# Patient Record
Sex: FEMALE | Race: BLACK OR AFRICAN AMERICAN | NOT HISPANIC OR LATINO | Employment: OTHER | URBAN - METROPOLITAN AREA
[De-identification: names, ages, dates, MRNs, and addresses within clinical notes are randomized per-mention and may not be internally consistent; named-entity substitution may affect disease eponyms.]

---

## 2017-10-02 ENCOUNTER — HOSPITAL ENCOUNTER (EMERGENCY)
Facility: HOSPITAL | Age: 37
Discharge: HOME | End: 2017-10-02
Attending: EMERGENCY MEDICINE
Payer: MEDICAID

## 2017-10-02 VITALS
RESPIRATION RATE: 18 BRPM | TEMPERATURE: 98 F | HEIGHT: 66 IN | WEIGHT: 190 LBS | DIASTOLIC BLOOD PRESSURE: 90 MMHG | SYSTOLIC BLOOD PRESSURE: 139 MMHG | HEART RATE: 70 BPM | OXYGEN SATURATION: 100 % | BODY MASS INDEX: 30.53 KG/M2

## 2017-10-02 DIAGNOSIS — F41.9 ANXIETY: ICD-10-CM

## 2017-10-02 DIAGNOSIS — M54.2 NECK PAIN ON LEFT SIDE: ICD-10-CM

## 2017-10-02 DIAGNOSIS — R07.89 MUSCULOSKELETAL CHEST PAIN: ICD-10-CM

## 2017-10-02 DIAGNOSIS — M25.512 ACUTE PAIN OF LEFT SHOULDER: Primary | ICD-10-CM

## 2017-10-02 LAB
ALBUMIN SERPL BCP-MCNC: 3.3 G/DL
ALP SERPL-CCNC: 61 U/L
ALT SERPL W/O P-5'-P-CCNC: 43 U/L
ANION GAP SERPL CALC-SCNC: 11 MMOL/L
AST SERPL-CCNC: 34 U/L
B-HCG UR QL: NEGATIVE
BASOPHILS # BLD AUTO: 0.01 K/UL
BASOPHILS NFR BLD: 0.2 %
BILIRUB SERPL-MCNC: 0.4 MG/DL
BNP SERPL-MCNC: 22 PG/ML
BUN SERPL-MCNC: 11 MG/DL
CALCIUM SERPL-MCNC: 9.1 MG/DL
CHLORIDE SERPL-SCNC: 106 MMOL/L
CO2 SERPL-SCNC: 22 MMOL/L
CREAT SERPL-MCNC: 1 MG/DL
CTP QC/QA: YES
DIFFERENTIAL METHOD: ABNORMAL
EOSINOPHIL # BLD AUTO: 0.1 K/UL
EOSINOPHIL NFR BLD: 1.9 %
ERYTHROCYTE [DISTWIDTH] IN BLOOD BY AUTOMATED COUNT: 12.4 %
EST. GFR  (AFRICAN AMERICAN): >60 ML/MIN/1.73 M^2
EST. GFR  (NON AFRICAN AMERICAN): >60 ML/MIN/1.73 M^2
GLUCOSE SERPL-MCNC: 111 MG/DL
HCT VFR BLD AUTO: 37.6 %
HGB BLD-MCNC: 12.4 G/DL
INR PPP: 1
LYMPHOCYTES # BLD AUTO: 1.7 K/UL
LYMPHOCYTES NFR BLD: 29.1 %
MCH RBC QN AUTO: 32.2 PG
MCHC RBC AUTO-ENTMCNC: 33 G/DL
MCV RBC AUTO: 98 FL
MONOCYTES # BLD AUTO: 0.4 K/UL
MONOCYTES NFR BLD: 6.6 %
NEUTROPHILS # BLD AUTO: 3.6 K/UL
NEUTROPHILS NFR BLD: 62 %
PLATELET # BLD AUTO: 252 K/UL
PMV BLD AUTO: 10.2 FL
POTASSIUM SERPL-SCNC: 3.8 MMOL/L
PROT SERPL-MCNC: 7.5 G/DL
PROTHROMBIN TIME: 10.9 SEC
RBC # BLD AUTO: 3.85 M/UL
SODIUM SERPL-SCNC: 139 MMOL/L
TROPONIN I SERPL DL<=0.01 NG/ML-MCNC: <0.006 NG/ML
WBC # BLD AUTO: 5.77 K/UL

## 2017-10-02 PROCEDURE — 81025 URINE PREGNANCY TEST: CPT | Performed by: EMERGENCY MEDICINE

## 2017-10-02 PROCEDURE — 85025 COMPLETE CBC W/AUTO DIFF WBC: CPT

## 2017-10-02 PROCEDURE — 63600175 PHARM REV CODE 636 W HCPCS: Performed by: PHYSICIAN ASSISTANT

## 2017-10-02 PROCEDURE — 84484 ASSAY OF TROPONIN QUANT: CPT

## 2017-10-02 PROCEDURE — 96372 THER/PROPH/DIAG INJ SC/IM: CPT

## 2017-10-02 PROCEDURE — 83880 ASSAY OF NATRIURETIC PEPTIDE: CPT

## 2017-10-02 PROCEDURE — 99285 EMERGENCY DEPT VISIT HI MDM: CPT | Mod: 25

## 2017-10-02 PROCEDURE — 85610 PROTHROMBIN TIME: CPT

## 2017-10-02 PROCEDURE — 80053 COMPREHEN METABOLIC PANEL: CPT

## 2017-10-02 RX ORDER — MELOXICAM 15 MG/1
15 TABLET ORAL DAILY
Qty: 20 TABLET | Refills: 0 | Status: SHIPPED | OUTPATIENT
Start: 2017-10-02 | End: 2017-10-22

## 2017-10-02 RX ORDER — METHOCARBAMOL 500 MG/1
1000 TABLET, FILM COATED ORAL 3 TIMES DAILY PRN
Qty: 20 TABLET | Refills: 0 | Status: SHIPPED | OUTPATIENT
Start: 2017-10-02 | End: 2017-10-07

## 2017-10-02 RX ORDER — ORPHENADRINE CITRATE 30 MG/ML
30 INJECTION INTRAMUSCULAR; INTRAVENOUS
Status: COMPLETED | OUTPATIENT
Start: 2017-10-02 | End: 2017-10-02

## 2017-10-02 RX ORDER — KETOROLAC TROMETHAMINE 30 MG/ML
15 INJECTION, SOLUTION INTRAMUSCULAR; INTRAVENOUS
Status: COMPLETED | OUTPATIENT
Start: 2017-10-02 | End: 2017-10-02

## 2017-10-02 RX ORDER — HYDROXYZINE PAMOATE 25 MG/1
25 CAPSULE ORAL 4 TIMES DAILY
Qty: 20 CAPSULE | Refills: 0 | Status: SHIPPED | OUTPATIENT
Start: 2017-10-02 | End: 2017-10-07

## 2017-10-02 RX ORDER — DIPHENHYDRAMINE HYDROCHLORIDE 50 MG/ML
25 INJECTION INTRAMUSCULAR; INTRAVENOUS
Status: COMPLETED | OUTPATIENT
Start: 2017-10-02 | End: 2017-10-02

## 2017-10-02 RX ADMIN — KETOROLAC TROMETHAMINE 15 MG: 30 INJECTION, SOLUTION INTRAMUSCULAR at 06:10

## 2017-10-02 RX ADMIN — ORPHENADRINE CITRATE 30 MG: 30 INJECTION INTRAMUSCULAR; INTRAVENOUS at 07:10

## 2017-10-02 RX ADMIN — DIPHENHYDRAMINE HYDROCHLORIDE 25 MG: 50 INJECTION, SOLUTION INTRAMUSCULAR; INTRAVENOUS at 07:10

## 2017-10-02 NOTE — ED PROVIDER NOTES
"Encounter Date: 10/2/2017    SCRIBE #1 NOTE: I, Abdirizak Ralph, am scribing for, and in the presence of,  Dulce Matthews PA-C. I have scribed the following portions of the note - Other sections scribed: HPI and ROS.       History     Chief Complaint   Patient presents with    Arm Pain     Pt states that she is having left arm pain that radiates to the left side of her chest when she moves. Pt denies SOB, nv. Denies trauma     CC: Arm Pain     HPI: This 37 y.o F with Bipolar 1 disorder, DM, HTN, anxiety and seizures presents to the ED c/o acute onset of severe (10/10) L upper extremity pain radiating to the L side chest which began at approximately 1400. The pt also reports L shoulder swelling and an episode of dizziness described as "room spinning" lasting 10 minutes. Pt's pain is worse when laying down and with L arm ROM. The pt suspects her symptoms may be secondary to stress, noting her sister recently passed away. Pt is tearful and reports she experienced SI last week and "thought about stabbing myself." Denies SI at this time and states "I can't do that to my sister's kids; I need to be strong for them." Pt denies previous episodes of similar symptoms. Pt is compliant with HTN medication. The pt denies weakness, numbness, tingling, light-headednes, diaphoresis, SOB, fever, chills, congestion, otalgia, sore throat, abdominal pain, N/V, neck pain, back pain, trauma, heavy lifting, and recent exercise. No prior tx.       The history is provided by the patient. No  was used.     Review of patient's allergies indicates:  No Known Allergies  Past Medical History:   Diagnosis Date    Bipolar 1 disorder     DM mellitus, gestational     Hypertension     Seizures      Past Surgical History:   Procedure Laterality Date     SECTION, LOW TRANSVERSE      x 3    ECTOPIC PREGNANCY SURGERY      KNEE SURGERY  2010    left knee    SALPINGECTOMY      THYROID SURGERY      TUBAL LIGATION   " "    Family History   Problem Relation Age of Onset    Heart disease Mother     Cancer Mother      Breast Cancer    Cancer Father      Throat Cancer    Cancer Maternal Grandmother     Cancer Maternal Grandfather      Social History   Substance Use Topics    Smoking status: Current Some Day Smoker     Types: Cigars    Smokeless tobacco: Never Used      Comment: Patient states she smokes 1 cigar per week.    Alcohol use Yes      Comment: socially     Review of Systems   Constitutional: Negative for chills, diaphoresis and fever.   HENT: Negative for congestion, ear pain, rhinorrhea and sore throat.    Eyes: Negative for redness.   Respiratory: Negative for cough and shortness of breath.    Cardiovascular: Positive for chest pain (L side).   Gastrointestinal: Negative for abdominal pain, diarrhea, nausea and vomiting.   Genitourinary: Negative for dysuria, frequency and urgency.   Musculoskeletal: Positive for joint swelling (L shoulder). Negative for back pain and neck pain.        (+) L upper extremity pain   Skin: Negative for rash.   Neurological: Positive for dizziness ("room spinning"). Negative for weakness and numbness.   Psychiatric/Behavioral: The patient is not nervous/anxious.        Physical Exam     Initial Vitals [10/02/17 1617]   BP Pulse Resp Temp SpO2   (!) 140/95 80 16 98.5 °F (36.9 °C) 96 %      MAP       110         Physical Exam    Constitutional: She appears well-developed and well-nourished.   tearful   HENT:   Head: Normocephalic.   Right Ear: External ear normal.   Left Ear: External ear normal.   Eyes: Conjunctivae are normal.   Neck: Normal range of motion.   Left paraspinal musculature TTP    Cardiovascular: Normal rate and regular rhythm. Exam reveals no gallop and no friction rub.    No murmur heard.  Pulses:       Radial pulses are 2+ on the right side, and 2+ on the left side.   Pulmonary/Chest: Breath sounds normal. She has no wheezes. She has no rhonchi. She has no rales. She " "exhibits tenderness (L chest wall).   Abdominal: Normal appearance and bowel sounds are normal. There is no tenderness. There is no rigidity, no rebound, no guarding and no CVA tenderness.   Musculoskeletal:   Diffuse TTP over anterolateral shoulder with no swelling or erythema. ROM limited due to pain.    Neurological: She is alert. No sensory deficit.   Skin: Skin is warm and dry. No erythema.   Psychiatric: She exhibits a depressed mood. She expresses no homicidal and no suicidal ideation. She expresses no suicidal plans and no homicidal plans.         ED Course   Procedures  Labs Reviewed   CBC W/ AUTO DIFFERENTIAL - Abnormal; Notable for the following:        Result Value    RBC 3.85 (*)     MCH 32.2 (*)     All other components within normal limits   COMPREHENSIVE METABOLIC PANEL - Abnormal; Notable for the following:     CO2 22 (*)     Glucose 111 (*)     Albumin 3.3 (*)     All other components within normal limits   TROPONIN I   PROTIME-INR   B-TYPE NATRIURETIC PEPTIDE   POCT URINE PREGNANCY             Medical Decision Making:   Initial Assessment:   She is a 37-year-old female who presents for evaluation of constant left arm and left-sided chest pain since 2 PM today.  Patient reports she had 10 minute episode of dizziness earlier.  Denies lightheadedness or diaphoresis.  Patient states she has not slept in 2 days because her sister passed away last week.  She reports she is under increased stress because "they're trying to separate my sister's 8 children."  She reports that she had SI last week and had plan to stab herself but states she would not do that because she needs to be strong for her sister's children. Denies SI or HI at this time.  She is tearful throughout the exam.  She does have history of anxiety and states she took her last Xanax 2 day.  She has follow-up appointment with her primary care doctor on Friday for refill.  Physical exam findings as detailed above.  EKG and cardiac workup " unremarkable.  Chest x-ray unremarkable.  Considered but doubt ACS, PNA, pneumothorax.  With reproducible chest wall pain think this is likely musculoskeletal.  Patient given Toradol and Norflex in the ED.  Patient also given IM Benadryl for anxiety.  Discharge patient to home with a week and Robaxin for pain and Vistaril for anxiety.  PCP, Dr. Kennedy, follow-up in 2 days.  ER return precautions discussed including worsening symptoms, SI, HI or as needed.  I discussed this patient with Dr. Blanc who agrees with assessment and plan.             Scribe Attestation:   Scribe #1: I performed the above scribed service and the documentation accurately describes the services I performed. I attest to the accuracy of the note.    Attending Attestation:           Physician Attestation for Scribe:  Physician Attestation Statement for Scribe #1: I, Dulce Matthews PA-C, reviewed documentation, as scribed by Abdirizak Ralph in my presence, and it is both accurate and complete.                 ED Course      Clinical Impression:   The primary encounter diagnosis was Acute pain of left shoulder. Diagnoses of Neck pain on left side, Musculoskeletal chest pain, and Anxiety were also pertinent to this visit.                           Dulce Matthews PA-C  10/04/17 0131

## 2017-10-03 NOTE — DISCHARGE INSTRUCTIONS
Please take Mobic and Robaxin as prescribed for pain.  Also take Vistaril as prescribed for anxiety.    Follow up with primary care in 2 days.    Return to ER if you develop worsening symptoms, thoughts of hurting yourself or others, or as needed.

## 2017-10-24 ENCOUNTER — HOSPITAL ENCOUNTER (EMERGENCY)
Facility: HOSPITAL | Age: 37
Discharge: HOME | End: 2017-10-24
Attending: EMERGENCY MEDICINE
Payer: MEDICAID

## 2017-10-24 VITALS
WEIGHT: 186 LBS | OXYGEN SATURATION: 98 % | HEIGHT: 66 IN | BODY MASS INDEX: 29.89 KG/M2 | SYSTOLIC BLOOD PRESSURE: 180 MMHG | TEMPERATURE: 98 F | RESPIRATION RATE: 18 BRPM | DIASTOLIC BLOOD PRESSURE: 116 MMHG | HEART RATE: 82 BPM

## 2017-10-24 DIAGNOSIS — R56.9 SEIZURE: Primary | ICD-10-CM
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
## 2017-10-24 DIAGNOSIS — T14.90XA TRAUMA: ICD-10-CM

## 2017-10-24 LAB
ALBUMIN SERPL BCP-MCNC: 3.4 G/DL
ALP SERPL-CCNC: 61 U/L
ALT SERPL W/O P-5'-P-CCNC: 32 U/L
AMORPH CRY URNS QL MICRO: ABNORMAL
AMPHET+METHAMPHET UR QL: NORMAL
ANION GAP SERPL CALC-SCNC: 7 MMOL/L
ANION GAP SERPL CALC-SCNC: 8 MMOL/L
AST SERPL-CCNC: 27 U/L
B-HCG UR QL: NEGATIVE
B-HCG UR QL: NEGATIVE
BACTERIA #/AREA URNS HPF: ABNORMAL /HPF
BARBITURATES UR QL SCN>200 NG/ML: NEGATIVE
BASOPHILS # BLD AUTO: 0.01 K/UL
BASOPHILS NFR BLD: 0.1 %
BENZODIAZ UR QL SCN>200 NG/ML: NORMAL
BILIRUB SERPL-MCNC: 0.2 MG/DL
BILIRUB UR QL STRIP: NEGATIVE
BUN SERPL-MCNC: 10 MG/DL
BUN SERPL-MCNC: 10 MG/DL
BZE UR QL SCN: NEGATIVE
CALCIUM SERPL-MCNC: 9.5 MG/DL
CALCIUM SERPL-MCNC: 9.5 MG/DL
CANNABINOIDS UR QL SCN: NEGATIVE
CHLORIDE SERPL-SCNC: 106 MMOL/L
CHLORIDE SERPL-SCNC: 106 MMOL/L
CLARITY UR: ABNORMAL
CO2 SERPL-SCNC: 24 MMOL/L
CO2 SERPL-SCNC: 26 MMOL/L
COLOR UR: YELLOW
CREAT SERPL-MCNC: 0.9 MG/DL
CREAT SERPL-MCNC: 0.9 MG/DL
CREAT UR-MCNC: 125.2 MG/DL
CTP QC/QA: YES
DIFFERENTIAL METHOD: ABNORMAL
EOSINOPHIL # BLD AUTO: 0.2 K/UL
EOSINOPHIL NFR BLD: 2 %
ERYTHROCYTE [DISTWIDTH] IN BLOOD BY AUTOMATED COUNT: 12.3 %
EST. GFR  (AFRICAN AMERICAN): >60 ML/MIN/1.73 M^2
EST. GFR  (AFRICAN AMERICAN): >60 ML/MIN/1.73 M^2
EST. GFR  (NON AFRICAN AMERICAN): >60 ML/MIN/1.73 M^2
EST. GFR  (NON AFRICAN AMERICAN): >60 ML/MIN/1.73 M^2
GLUCOSE SERPL-MCNC: 70 MG/DL
GLUCOSE SERPL-MCNC: 72 MG/DL
GLUCOSE UR QL STRIP: NEGATIVE
HCT VFR BLD AUTO: 39.5 %
HGB BLD-MCNC: 13.6 G/DL
HGB UR QL STRIP: ABNORMAL
KETONES UR QL STRIP: NEGATIVE
LEUKOCYTE ESTERASE UR QL STRIP: NEGATIVE
LYMPHOCYTES # BLD AUTO: 1.6 K/UL
LYMPHOCYTES NFR BLD: 20.9 %
MCH RBC QN AUTO: 32.7 PG
MCHC RBC AUTO-ENTMCNC: 34.4 G/DL
MCV RBC AUTO: 95 FL
METHADONE UR QL SCN>300 NG/ML: NEGATIVE
MICROSCOPIC COMMENT: ABNORMAL
MONOCYTES # BLD AUTO: 0.5 K/UL
MONOCYTES NFR BLD: 6.5 %
NEUTROPHILS # BLD AUTO: 5.3 K/UL
NEUTROPHILS NFR BLD: 70.5 %
NITRITE UR QL STRIP: NEGATIVE
OPIATES UR QL SCN: NEGATIVE
PCP UR QL SCN>25 NG/ML: NEGATIVE
PH UR STRIP: 8 [PH] (ref 5–8)
PHENYTOIN SERPL-MCNC: <0.1 UG/ML
PLATELET # BLD AUTO: 279 K/UL
PMV BLD AUTO: 9.8 FL
POTASSIUM SERPL-SCNC: 3.9 MMOL/L
POTASSIUM SERPL-SCNC: 3.9 MMOL/L
PROT SERPL-MCNC: 7.8 G/DL
PROT UR QL STRIP: NEGATIVE
RBC # BLD AUTO: 4.16 M/UL
RBC #/AREA URNS HPF: 3 /HPF (ref 0–4)
SODIUM SERPL-SCNC: 138 MMOL/L
SODIUM SERPL-SCNC: 139 MMOL/L
SP GR UR STRIP: 1.02 (ref 1–1.03)
SQUAMOUS #/AREA URNS HPF: 4 /HPF
TOXICOLOGY INFORMATION: NORMAL
URN SPEC COLLECT METH UR: ABNORMAL
UROBILINOGEN UR STRIP-ACNC: ABNORMAL EU/DL
WBC # BLD AUTO: 7.56 K/UL
WBC #/AREA URNS HPF: 1 /HPF (ref 0–5)

## 2017-10-24 PROCEDURE — 85025 COMPLETE CBC W/AUTO DIFF WBC: CPT

## 2017-10-24 PROCEDURE — 80307 DRUG TEST PRSMV CHEM ANLYZR: CPT

## 2017-10-24 PROCEDURE — 81000 URINALYSIS NONAUTO W/SCOPE: CPT

## 2017-10-24 PROCEDURE — 81025 URINE PREGNANCY TEST: CPT | Performed by: PHYSICIAN ASSISTANT

## 2017-10-24 PROCEDURE — 93010 ELECTROCARDIOGRAM REPORT: CPT | Mod: ,,, | Performed by: INTERNAL MEDICINE

## 2017-10-24 PROCEDURE — 99285 EMERGENCY DEPT VISIT HI MDM: CPT | Mod: 25

## 2017-10-24 PROCEDURE — 81025 URINE PREGNANCY TEST: CPT

## 2017-10-24 PROCEDURE — 80048 BASIC METABOLIC PNL TOTAL CA: CPT

## 2017-10-24 PROCEDURE — 12011 RPR F/E/E/N/L/M 2.5 CM/<: CPT

## 2017-10-24 PROCEDURE — 25000003 PHARM REV CODE 250: Performed by: EMERGENCY MEDICINE

## 2017-10-24 PROCEDURE — 96365 THER/PROPH/DIAG IV INF INIT: CPT

## 2017-10-24 PROCEDURE — 80185 ASSAY OF PHENYTOIN TOTAL: CPT

## 2017-10-24 PROCEDURE — 80053 COMPREHEN METABOLIC PANEL: CPT

## 2017-10-24 PROCEDURE — 93005 ELECTROCARDIOGRAM TRACING: CPT

## 2017-10-24 PROCEDURE — 63600175 PHARM REV CODE 636 W HCPCS: Performed by: EMERGENCY MEDICINE

## 2017-10-24 RX ORDER — HYDROXYZINE HYDROCHLORIDE 25 MG/1
25 TABLET, FILM COATED ORAL 3 TIMES DAILY
COMMUNITY
End: 2022-03-28

## 2017-10-24 RX ORDER — PHENYTOIN SODIUM 50 MG/ML
1000 INJECTION INTRAMUSCULAR; INTRAVENOUS
Status: DISCONTINUED | OUTPATIENT
Start: 2017-10-24 | End: 2017-10-24

## 2017-10-24 RX ORDER — LOSARTAN POTASSIUM AND HYDROCHLOROTHIAZIDE 25; 100 MG/1; MG/1
1 TABLET ORAL DAILY
COMMUNITY
End: 2020-09-08 | Stop reason: SDUPTHER

## 2017-10-24 RX ORDER — OXYCODONE AND ACETAMINOPHEN 5; 325 MG/1; MG/1
1 TABLET ORAL
Status: DISCONTINUED | OUTPATIENT
Start: 2017-10-24 | End: 2017-10-25 | Stop reason: HOSPADM

## 2017-10-24 RX ORDER — MIRTAZAPINE 15 MG/1
15 TABLET, FILM COATED ORAL NIGHTLY
COMMUNITY
End: 2021-04-27 | Stop reason: ALTCHOICE

## 2017-10-24 RX ORDER — METHOCARBAMOL 500 MG/1
500 TABLET, FILM COATED ORAL 4 TIMES DAILY
COMMUNITY

## 2017-10-24 RX ORDER — ACETAMINOPHEN 500 MG
1000 TABLET ORAL
Status: COMPLETED | OUTPATIENT
Start: 2017-10-24 | End: 2017-10-24

## 2017-10-24 RX ORDER — MELOXICAM 15 MG/1
15 TABLET ORAL DAILY
Status: ON HOLD | COMMUNITY
End: 2022-06-01 | Stop reason: HOSPADM

## 2017-10-24 RX ORDER — AMLODIPINE BESYLATE 5 MG/1
5 TABLET ORAL DAILY
COMMUNITY
End: 2022-05-07

## 2017-10-24 RX ADMIN — ACETAMINOPHEN 1000 MG: 500 TABLET ORAL at 05:10

## 2017-10-24 RX ADMIN — PHENYTOIN SODIUM 1000 MG: 50 INJECTION INTRAMUSCULAR; INTRAVENOUS at 06:10

## 2017-10-24 NOTE — ED PROVIDER NOTES
Encounter Date: 10/24/2017       History     Chief Complaint   Patient presents with    Seizures     unknown if she fell. Unwitnessed. Compliant with Dilantin. +1 inch lac to chin.      37-year-old black female with a known history of seizures on Dilantin compliant with same presents with generalized tonic-clonic seizure today fell and hit her face and chin has a small lag on the bottom of her chin no active bleeding she was postictal and is back to normal baseline now          Review of patient's allergies indicates:  No Known Allergies  Past Medical History:   Diagnosis Date    Bipolar 1 disorder     DM mellitus, gestational     Hypertension     Seizures      Past Surgical History:   Procedure Laterality Date     SECTION, LOW TRANSVERSE      x 3    ECTOPIC PREGNANCY SURGERY      KNEE SURGERY  2010    left knee    SALPINGECTOMY      THYROID SURGERY      TUBAL LIGATION       Family History   Problem Relation Age of Onset    Heart disease Mother     Cancer Mother      Breast Cancer    Cancer Father      Throat Cancer    Cancer Maternal Grandmother     Cancer Maternal Grandfather      Social History   Substance Use Topics    Smoking status: Current Some Day Smoker     Types: Cigars    Smokeless tobacco: Never Used      Comment: Patient states she smokes 1 cigar per week.    Alcohol use Yes      Comment: socially     Review of Systems   Neurological: Positive for seizures.   All other systems reviewed and are negative.      Physical Exam     Initial Vitals [10/24/17 1257]   BP Pulse Resp Temp SpO2   (!) 139/90 77 16 98.9 °F (37.2 °C) 99 %      MAP       106.33         Physical Exam    Nursing note and vitals reviewed.  Constitutional: She appears well-developed and well-nourished.   HENT:   Head: Normocephalic and atraumatic.   Mouth/Throat: Oropharynx is clear and moist.   Eyes: Conjunctivae and EOM are normal. Pupils are equal, round, and reactive to light.   Neck: Normal range of motion.  Neck supple.   Cardiovascular: Normal rate, regular rhythm, normal heart sounds and intact distal pulses.   Pulmonary/Chest: Breath sounds normal.   Abdominal: Soft. Bowel sounds are normal.   Musculoskeletal: Normal range of motion.   Neurological: She is alert and oriented to person, place, and time. She has normal strength and normal reflexes.   Skin: Skin is warm and dry.   Patient has a 0.5 cm laceration to the bottom of her chin to subcutaneous tissue no active bleeding   Psychiatric: She has a normal mood and affect. Thought content normal.         ED Course   Lac Repair  Date/Time: 10/24/2017 4:23 PM  Performed by: MAEVE BAÑUELOS  Authorized by: MAEVE BAÑUELOS   Consent Done: Not Needed  Body area: head/neck  Location details: chin  Laceration length: 0.5 cm  Tendon involvement: none  Nerve involvement: none  Vascular damage: no  Preparation: Patient was prepped and draped in the usual sterile fashion.  Irrigation solution: saline  Irrigation method: syringe  Amount of cleaning: standard  Debridement: none  Degree of undermining: none  Skin closure: glue  Approximation: close  Approximation difficulty: simple  Patient tolerance: Patient tolerated the procedure well with no immediate complications        Labs Reviewed   CBC W/ AUTO DIFFERENTIAL - Abnormal; Notable for the following:        Result Value    MCH 32.7 (*)     All other components within normal limits   COMPREHENSIVE METABOLIC PANEL - Abnormal; Notable for the following:     Albumin 3.4 (*)     Anion Gap 7 (*)     All other components within normal limits   PHENYTOIN LEVEL, TOTAL - Abnormal; Notable for the following:     Phenytoin Lvl <0.1 (*)     All other components within normal limits   URINALYSIS - Abnormal; Notable for the following:     Appearance, UA Cloudy (*)     Occult Blood UA 2+ (*)     Urobilinogen, UA 4.0-6.0 (*)     All other components within normal limits   URINALYSIS MICROSCOPIC - Abnormal; Notable for the  following:     Amorphous, UA Many (*)     All other components within normal limits   DRUG SCREEN PANEL, URINE EMERGENCY   PREGNANCY TEST, URINE RAPID   BASIC METABOLIC PANEL   PHENYTOIN LEVEL, TOTAL   CBC W/ AUTO DIFFERENTIAL   POCT URINE PREGNANCY   POCT GLUCOSE MONITORING CONTINUOUS             Medical Decision Making:   Differential Diagnosis:   37-year-old black female with a known seizure disorder on Dilantin had a generalized tonic-clonic seizure today fell forward onto her face was postictal now back to normal baseline mental status he only injury she has is a 0.5 cm laceration to the underside of her chin which I closed with Dermabond see procedure note by CT her head face and cervical spine all read as negative by the radiologist her Dilantin level was 0I give her a gram of Dilantin IV here in the emergency department prescribe her Dilantin to continue taking at home after follow-up with her primary care doctor soon as possible return ER for any altered mental status change of vision speech strength K sensation nausea vomiting                   ED Course      Clinical Impression:   Seizure                             Kj Oconnor MD  10/24/17 3174

## 2017-10-24 NOTE — ED TRIAGE NOTES
"Pt arrived to Ed due to unwitnessed seizure. " pt states I don't remember any thing." pt is AAOx3 at this time. Pt states having HA at this time. Pt states falling face first. Pt noted to have laceration to chin   "

## 2017-10-25 NOTE — ED NOTES
Pt left with receiving her discharge paperwork. PT was upset she didn't received her pain medications .

## 2017-11-18 ENCOUNTER — HOSPITAL ENCOUNTER (EMERGENCY)
Facility: HOSPITAL | Age: 37
Discharge: HOME | End: 2017-11-19
Attending: EMERGENCY MEDICINE
Payer: MEDICAID

## 2017-11-18 DIAGNOSIS — F19.10 POLYSUBSTANCE ABUSE: Primary | ICD-10-CM

## 2017-11-18 DIAGNOSIS — F10.929 ALCOHOLIC INTOXICATION WITH COMPLICATION: ICD-10-CM

## 2017-11-18 DIAGNOSIS — Z91.148 NONCOMPLIANCE WITH MEDICATION REGIMEN: ICD-10-CM

## 2017-11-18 DIAGNOSIS — R45.1 AGITATION: ICD-10-CM

## 2017-11-18 DIAGNOSIS — R56.9 SEIZURE: ICD-10-CM

## 2017-11-18 LAB
ALBUMIN SERPL BCP-MCNC: 4.2 G/DL
ALP SERPL-CCNC: 65 U/L
ALT SERPL W/O P-5'-P-CCNC: 56 U/L
AMPHET+METHAMPHET UR QL: NORMAL
ANION GAP SERPL CALC-SCNC: 12 MMOL/L
APAP SERPL-MCNC: <3 UG/ML
AST SERPL-CCNC: 51 U/L
B-HCG UR QL: NEGATIVE
BARBITURATES UR QL SCN>200 NG/ML: NEGATIVE
BASOPHILS # BLD AUTO: 0.01 K/UL
BASOPHILS NFR BLD: 0.1 %
BENZODIAZ UR QL SCN>200 NG/ML: NEGATIVE
BILIRUB SERPL-MCNC: 0.2 MG/DL
BILIRUB UR QL STRIP: NEGATIVE
BUN SERPL-MCNC: 14 MG/DL
BZE UR QL SCN: NORMAL
CALCIUM SERPL-MCNC: 9.5 MG/DL
CANNABINOIDS UR QL SCN: NEGATIVE
CHLORIDE SERPL-SCNC: 107 MMOL/L
CLARITY UR: CLEAR
CO2 SERPL-SCNC: 21 MMOL/L
COLOR UR: NORMAL
CREAT SERPL-MCNC: 1 MG/DL
CREAT UR-MCNC: 40.2 MG/DL
DIFFERENTIAL METHOD: ABNORMAL
EOSINOPHIL # BLD AUTO: 0 K/UL
EOSINOPHIL NFR BLD: 0.3 %
ERYTHROCYTE [DISTWIDTH] IN BLOOD BY AUTOMATED COUNT: 12.5 %
EST. GFR  (AFRICAN AMERICAN): >60 ML/MIN/1.73 M^2
EST. GFR  (NON AFRICAN AMERICAN): >60 ML/MIN/1.73 M^2
ETHANOL SERPL-MCNC: 161 MG/DL
GLUCOSE SERPL-MCNC: 102 MG/DL
GLUCOSE UR QL STRIP: NEGATIVE
HCT VFR BLD AUTO: 39.9 %
HGB BLD-MCNC: 13.6 G/DL
HGB UR QL STRIP: NEGATIVE
KETONES UR QL STRIP: NEGATIVE
LEUKOCYTE ESTERASE UR QL STRIP: NEGATIVE
LYMPHOCYTES # BLD AUTO: 1.4 K/UL
LYMPHOCYTES NFR BLD: 14.3 %
MCH RBC QN AUTO: 32.6 PG
MCHC RBC AUTO-ENTMCNC: 34.1 G/DL
MCV RBC AUTO: 96 FL
METHADONE UR QL SCN>300 NG/ML: NEGATIVE
MONOCYTES # BLD AUTO: 0.4 K/UL
MONOCYTES NFR BLD: 3.9 %
NEUTROPHILS # BLD AUTO: 7.8 K/UL
NEUTROPHILS NFR BLD: 81.4 %
NITRITE UR QL STRIP: NEGATIVE
OPIATES UR QL SCN: NEGATIVE
PCP UR QL SCN>25 NG/ML: NEGATIVE
PH UR STRIP: 6 [PH] (ref 5–8)
PHENYTOIN SERPL-MCNC: <0.1 UG/ML
PHENYTOIN SERPL-MCNC: <0.1 UG/ML
PLATELET # BLD AUTO: 254 K/UL
PMV BLD AUTO: 10.2 FL
POTASSIUM SERPL-SCNC: 3.9 MMOL/L
PROT SERPL-MCNC: 9 G/DL
PROT UR QL STRIP: NEGATIVE
RBC # BLD AUTO: 4.17 M/UL
SALICYLATES SERPL-MCNC: <5 MG/DL
SODIUM SERPL-SCNC: 140 MMOL/L
SP GR UR STRIP: 1.01 (ref 1–1.03)
T4 FREE SERPL-MCNC: 1.16 NG/DL
TOXICOLOGY INFORMATION: NORMAL
TSH SERPL DL<=0.005 MIU/L-ACNC: 0.23 UIU/ML
URN SPEC COLLECT METH UR: NORMAL
UROBILINOGEN UR STRIP-ACNC: NEGATIVE EU/DL
WBC # BLD AUTO: 9.61 K/UL

## 2017-11-18 PROCEDURE — 80307 DRUG TEST PRSMV CHEM ANLYZR: CPT

## 2017-11-18 PROCEDURE — 80186 ASSAY OF PHENYTOIN FREE: CPT

## 2017-11-18 PROCEDURE — 80329 ANALGESICS NON-OPIOID 1 OR 2: CPT

## 2017-11-18 PROCEDURE — 81003 URINALYSIS AUTO W/O SCOPE: CPT

## 2017-11-18 PROCEDURE — 84439 ASSAY OF FREE THYROXINE: CPT

## 2017-11-18 PROCEDURE — 93010 ELECTROCARDIOGRAM REPORT: CPT | Mod: ,,, | Performed by: INTERNAL MEDICINE

## 2017-11-18 PROCEDURE — 99285 EMERGENCY DEPT VISIT HI MDM: CPT

## 2017-11-18 PROCEDURE — 85025 COMPLETE CBC W/AUTO DIFF WBC: CPT

## 2017-11-18 PROCEDURE — 96372 THER/PROPH/DIAG INJ SC/IM: CPT

## 2017-11-18 PROCEDURE — 80320 DRUG SCREEN QUANTALCOHOLS: CPT

## 2017-11-18 PROCEDURE — 93005 ELECTROCARDIOGRAM TRACING: CPT

## 2017-11-18 PROCEDURE — 25000003 PHARM REV CODE 250: Performed by: EMERGENCY MEDICINE

## 2017-11-18 PROCEDURE — 80185 ASSAY OF PHENYTOIN TOTAL: CPT

## 2017-11-18 PROCEDURE — 81025 URINE PREGNANCY TEST: CPT

## 2017-11-18 PROCEDURE — 84443 ASSAY THYROID STIM HORMONE: CPT

## 2017-11-18 PROCEDURE — 63600175 PHARM REV CODE 636 W HCPCS: Performed by: EMERGENCY MEDICINE

## 2017-11-18 PROCEDURE — 80053 COMPREHEN METABOLIC PANEL: CPT

## 2017-11-18 RX ORDER — PHENYTOIN 125 MG/5ML
100 SUSPENSION ORAL 3 TIMES DAILY
Qty: 360 ML | Refills: 11 | Status: SHIPPED | OUTPATIENT
Start: 2017-11-18 | End: 2021-11-29 | Stop reason: ALTCHOICE

## 2017-11-18 RX ORDER — PHENYTOIN SODIUM 100 MG/1
300 CAPSULE, EXTENDED RELEASE ORAL
Status: COMPLETED | OUTPATIENT
Start: 2017-11-18 | End: 2017-11-18

## 2017-11-18 RX ORDER — DIPHENHYDRAMINE HYDROCHLORIDE 50 MG/ML
50 INJECTION INTRAMUSCULAR; INTRAVENOUS
Status: COMPLETED | OUTPATIENT
Start: 2017-11-18 | End: 2017-11-18

## 2017-11-18 RX ORDER — LORAZEPAM 2 MG/ML
2 INJECTION INTRAMUSCULAR
Status: COMPLETED | OUTPATIENT
Start: 2017-11-18 | End: 2017-11-18

## 2017-11-18 RX ORDER — HALOPERIDOL 5 MG/ML
5 INJECTION INTRAMUSCULAR
Status: COMPLETED | OUTPATIENT
Start: 2017-11-18 | End: 2017-11-18

## 2017-11-18 RX ADMIN — DIPHENHYDRAMINE HYDROCHLORIDE 50 MG: 50 INJECTION, SOLUTION INTRAMUSCULAR; INTRAVENOUS at 09:11

## 2017-11-18 RX ADMIN — PHENYTOIN SODIUM 300 MG: 100 CAPSULE, EXTENDED RELEASE ORAL at 11:11

## 2017-11-18 RX ADMIN — LORAZEPAM 2 MG: 2 INJECTION INTRAMUSCULAR; INTRAVENOUS at 09:11

## 2017-11-18 RX ADMIN — HALOPERIDOL LACTATE 5 MG: 5 INJECTION, SOLUTION INTRAMUSCULAR at 09:11

## 2017-11-19 VITALS
BODY MASS INDEX: 28.93 KG/M2 | SYSTOLIC BLOOD PRESSURE: 128 MMHG | DIASTOLIC BLOOD PRESSURE: 64 MMHG | HEIGHT: 66 IN | HEART RATE: 108 BPM | RESPIRATION RATE: 18 BRPM | TEMPERATURE: 98 F | OXYGEN SATURATION: 98 % | WEIGHT: 180 LBS

## 2017-11-19 NOTE — ED NOTES
"Pt out of bed, agitated, picked up shoes and placed on feet while yelling, stating "I am leaving, there aint nothing wrong with me! I'm not staying here." Niece and staff at pt's side, attempting to calm patient down. Pt then walks out of room and attempts to leave the building, while yelling at staff and niece. Pt encouraged to return to room for safety, Pt continues to try to leave while yelling louder at staff, becoming more agitated and refusing care. Pt is tearful at current time. Pt refused to return to room, began verbal abuse to saff, nurse, MD and psych tech Demetrius at pt side to assist pt back into bed. Pt attempted to swing at Leroy, pt placed back into room where pt began yelling racial slurs at Leroy and calling nurses names such as "rick". Niece at bedside refusing to leave, Officers present to escort niece after refusal to leave multiple times. [facility] officer escorted niece off unit.   "

## 2017-11-19 NOTE — ED PROVIDER NOTES
"Encounter Date: 2017    SCRIBE #1 NOTE: I, Nasim Madrigal, am scribing for, and in the presence of,  Zoe Lal MD. I have scribed the following portions of the note - Other sections scribed: HPI, ROS.       History     Chief Complaint   Patient presents with    Seizures     EMS reports that the patient had a witnessed seizure. The seizure was witnessed by the patient's niece who states it lasted 5 minutes. Patient appears postictal.     Alcohol Intoxication     Patient reports that she drank a "gallon of tequila." Patient was restrained by EMS for being combative.      CC: Seizures & Alcohol Intoxication    HPI: This 37 y.o. Female with seizure disorder presents to the ED via EMS for emergent evaluation of a seizure while on a sofa, witnessed by a relative PTA. Per pt, relative said pt was foaming from the mouth and evidently fell from sofa to floor. Pt does not remember the episode. No alleviating or exacerbating factors reported. After arrival to the ED, pt became agitated, physically aggressive, tearful, yelling, and attempted to find an exit. Pt says "I won't lie to you, I've been drinking and smoked marijuana today." Pt denies pain, suicidal ideations, and homicidal ideations. Pt reports taking daily Dilantin but denies taking it tonight. Pt says her sister and nephew recently passed away which has caused her increased stress.     PMHx HTN, DM (gestational), seizures, bipolar 1 disorder, and PSHx , ectopic pregnancy surgery, salpingectomy, L knee surgery, thyroid surgery, and tubal ligation       The history is provided by the patient. No  was used.     Review of patient's allergies indicates:   Allergen Reactions    Oranges [orange]      Past Medical History:   Diagnosis Date    Bipolar 1 disorder     DM mellitus, gestational     Hypertension     Seizures      Past Surgical History:   Procedure Laterality Date     SECTION, LOW TRANSVERSE      x 3    " ECTOPIC PREGNANCY SURGERY      KNEE SURGERY  2010    left knee    SALPINGECTOMY      THYROID SURGERY      TUBAL LIGATION       Family History   Problem Relation Age of Onset    Heart disease Mother     Cancer Mother      Breast Cancer    Cancer Father      Throat Cancer    Cancer Maternal Grandmother     Cancer Maternal Grandfather      Social History   Substance Use Topics    Smoking status: Current Some Day Smoker     Types: Cigars    Smokeless tobacco: Never Used      Comment: Patient states she smokes 1 cigar per week.    Alcohol use 2.4 oz/week     4 Shots of liquor per week     Review of Systems   Constitutional: Negative for fever.   HENT: Negative for sore throat.    Eyes: Negative for visual disturbance.   Respiratory: Negative for shortness of breath.    Cardiovascular: Negative for chest pain.   Gastrointestinal: Negative for abdominal pain and nausea.   Genitourinary: Negative for dysuria.   Musculoskeletal: Negative for back pain, myalgias and neck stiffness.   Skin: Negative for rash.   Neurological: Positive for seizures. Negative for weakness.   Hematological: Does not bruise/bleed easily.   Psychiatric/Behavioral: Positive for agitation. Negative for self-injury and suicidal ideas.        (+) alcohol/marijuana intoxication       Physical Exam     Initial Vitals [11/18/17 2055]   BP Pulse Resp Temp SpO2   124/85 97 18 97.7 °F (36.5 °C) 98 %      MAP       98         Physical Exam    Nursing note and vitals reviewed.  Constitutional: She appears well-developed and well-nourished. She is not diaphoretic. She is active.  Non-toxic appearance. She does not appear ill.   Initially agitated, later tearful. Speaking in complete sentences.   HENT:   Head: Normocephalic and atraumatic.   Mouth/Throat: Oropharynx is clear and moist.   No evidence of trauma.   Eyes: Conjunctivae and EOM are normal. Pupils are equal, round, and reactive to light.   Neck: Normal range of motion. Neck supple.    Cardiovascular: Normal rate and regular rhythm.   Pulmonary/Chest: Effort normal and breath sounds normal. No respiratory distress. She has no wheezes. She has no rhonchi. She has no rales.   Abdominal: Soft. Normal appearance and bowel sounds are normal. She exhibits no distension. There is no tenderness.   Musculoskeletal: Normal range of motion.   Neurological: She is alert and oriented to person, place, and time. She has normal strength.   Skin: Skin is warm and dry.   Psychiatric: She is agitated. She expresses no homicidal and no suicidal ideation.   Somewhat agitated initially; denies suicidal or homicidal ideation         ED Course   Procedures  Labs Reviewed   CBC W/ AUTO DIFFERENTIAL - Abnormal; Notable for the following:        Result Value    MCH 32.6 (*)     Gran # 7.8 (*)     Gran% 81.4 (*)     Lymph% 14.3 (*)     Mono% 3.9 (*)     All other components within normal limits   COMPREHENSIVE METABOLIC PANEL - Abnormal; Notable for the following:     CO2 21 (*)     Total Protein 9.0 (*)     AST 51 (*)     ALT 56 (*)     All other components within normal limits   URINALYSIS   DRUG SCREEN PANEL, URINE EMERGENCY   PREGNANCY TEST, URINE RAPID   PHENYTOIN LEVEL, TOTAL   ALCOHOL,MEDICAL (ETHANOL)   TSH   ACETAMINOPHEN LEVEL   SALICYLATE LEVEL   PHENYTOIN LEVEL, TOTAL   PHENYTOIN LEVEL, TOTAL AND FREE     EKG Readings: (Independently Interpreted)   22:59: NSR, HR 84. Normal axis and intervals. No STEMI.          Medical Decision Making:   History:   Old Medical Records: I decided to obtain old medical records.  Old Records Summarized: records from previous admission(s).  Initial Assessment:   This is an emergent evaluation of a 37 y.o. Female with seizure. Seen for same 3.5 weeks ago. Admits EtOH/THC today.  Differential Diagnosis:   Ddx includes noncompliance with dilantin, occult trauma, toxidrome, other.  Independently Interpreted Test(s):   I have ordered and independently interpreted EKG Reading(s) - see  prior notes  Clinical Tests:   Lab Tests: Ordered and Reviewed  Medical Tests: Ordered and Reviewed  ED Management:  Due to initial agitation, I checked psych labs, which are significant for tox + amphetamines, cocaine. Alcohol 161. Dilantin subtherapeutic.    EKG without dysrhythmia.    I suspect patient's seizure today is related to intoxication and noncompliance with dilantin. I have treated her with oral dilantin. I have discussed compliance with medication regimen.    Patient calmed down after B-52 in ED. She was appropriate and neuro intact and denied SI. Given no trauma to exam and intact neuro status several hours after presentation for seizure in this patient with known seizures, will defer head CT at this time.     D/c'ed home with relative. Dilantin refilled.             Scribe Attestation:   Scribe #1: I performed the above scribed service and the documentation accurately describes the services I performed. I attest to the accuracy of the note.    Attending Attestation:           Physician Attestation for Scribe:  Physician Attestation Statement for Scribe #1: I, Zoe Lal MD, reviewed documentation, as scribed by Nasim Madrigal in my presence, and it is both accurate and complete.                 ED Course      Clinical Impression:   The primary encounter diagnosis was Polysubstance abuse. Diagnoses of Seizure, Noncompliance with medication regimen, Alcoholic intoxication with complication, and Agitation were also pertinent to this visit.                           Zoe Lal MD  11/19/17 1920

## 2017-11-19 NOTE — ED TRIAGE NOTES
"Pt to the Ed via EMS. Pt arrived into room. Ems reports pt had a seizure, denies falls or hurting self. Pt very tearful. Pt niece at bedside. Pt niece reports pt had seizure and began foaming at the mouth. Pt continue to repeat "I do not want to be here, I just want to be asleep." Pt denies suicidal thoughts, however reports "wanting to fall asleep and not wake up". Pt states "i will just drink myself to death, I am going through a lot". Pt reports her sister  two months ago and nephew  a week ago. Pt is intoxicated and very loud. Pt placed on continuous pulse ox.   "

## 2017-11-19 NOTE — ED NOTES
"Pt out of room at this agitated stating "I just want to go home, aint nothing wrong with me." Pt with niece at this time walking around outside of room. Security called to assist with pt. Followed by two ed techs, four RNs. Provider in hallway, helped to assist pt to room. Pt then took one swing at ed tech.   "

## 2017-11-19 NOTE — ED NOTES
Pt ambulated without assistance with a steady gait. Pt is aao x4 and states she is ready to leave. MD aware of pt's status and states pt okay for discharge.

## 2017-11-21 LAB
PHENYTOIN FREE SERPL-MCNC: <0.8 MCG/ML
PHENYTOIN, TOTAL: 0.8 MCG/ML

## 2017-12-14 ENCOUNTER — HOSPITAL ENCOUNTER (EMERGENCY)
Facility: HOSPITAL | Age: 37
Discharge: HOME | End: 2017-12-14
Attending: EMERGENCY MEDICINE
Payer: MEDICAID

## 2017-12-14 VITALS
HEIGHT: 66 IN | HEART RATE: 95 BPM | OXYGEN SATURATION: 97 % | TEMPERATURE: 98 F | SYSTOLIC BLOOD PRESSURE: 121 MMHG | DIASTOLIC BLOOD PRESSURE: 66 MMHG | RESPIRATION RATE: 18 BRPM | BODY MASS INDEX: 27 KG/M2 | WEIGHT: 168 LBS

## 2017-12-14 DIAGNOSIS — E86.0 DEHYDRATION: Primary | ICD-10-CM

## 2017-12-14 DIAGNOSIS — B34.9 VIRAL ILLNESS: ICD-10-CM

## 2017-12-14 LAB
BACTERIA #/AREA URNS HPF: ABNORMAL /HPF
BILIRUB UR QL STRIP: NEGATIVE
CLARITY UR: CLEAR
COLOR UR: YELLOW
DEPRECATED S PYO AG THROAT QL EIA: NEGATIVE
FLUAV AG SPEC QL IA: NEGATIVE
FLUBV AG SPEC QL IA: NEGATIVE
GLUCOSE UR QL STRIP: NEGATIVE
HGB UR QL STRIP: NEGATIVE
HYALINE CASTS #/AREA URNS LPF: 0 /LPF
KETONES UR QL STRIP: NEGATIVE
LEUKOCYTE ESTERASE UR QL STRIP: NEGATIVE
MICROSCOPIC COMMENT: ABNORMAL
NITRITE UR QL STRIP: NEGATIVE
PH UR STRIP: 5 [PH] (ref 5–8)
PROT UR QL STRIP: ABNORMAL
RBC #/AREA URNS HPF: 0 /HPF (ref 0–4)
SP GR UR STRIP: 1.02 (ref 1–1.03)
SPECIMEN SOURCE: NORMAL
SQUAMOUS #/AREA URNS HPF: 2 /HPF
URN SPEC COLLECT METH UR: ABNORMAL
UROBILINOGEN UR STRIP-ACNC: ABNORMAL EU/DL
WBC #/AREA URNS HPF: 1 /HPF (ref 0–5)

## 2017-12-14 PROCEDURE — 87081 CULTURE SCREEN ONLY: CPT

## 2017-12-14 PROCEDURE — 87400 INFLUENZA A/B EACH AG IA: CPT

## 2017-12-14 PROCEDURE — 96360 HYDRATION IV INFUSION INIT: CPT

## 2017-12-14 PROCEDURE — 99284 EMERGENCY DEPT VISIT MOD MDM: CPT | Mod: 25

## 2017-12-14 PROCEDURE — 96361 HYDRATE IV INFUSION ADD-ON: CPT

## 2017-12-14 PROCEDURE — 25000003 PHARM REV CODE 250: Performed by: NURSE PRACTITIONER

## 2017-12-14 PROCEDURE — 87880 STREP A ASSAY W/OPTIC: CPT

## 2017-12-14 PROCEDURE — 81000 URINALYSIS NONAUTO W/SCOPE: CPT

## 2017-12-14 RX ORDER — SODIUM CHLORIDE 9 MG/ML
1000 INJECTION, SOLUTION INTRAVENOUS
Status: COMPLETED | OUTPATIENT
Start: 2017-12-14 | End: 2017-12-14

## 2017-12-14 RX ORDER — ACETAMINOPHEN 500 MG
1000 TABLET ORAL
Status: COMPLETED | OUTPATIENT
Start: 2017-12-14 | End: 2017-12-14

## 2017-12-14 RX ORDER — ONDANSETRON 4 MG/1
4 TABLET, ORALLY DISINTEGRATING ORAL EVERY 8 HOURS PRN
Qty: 12 TABLET | Refills: 0 | Status: SHIPPED | OUTPATIENT
Start: 2017-12-14

## 2017-12-14 RX ORDER — ONDANSETRON 4 MG/1
4 TABLET, ORALLY DISINTEGRATING ORAL
Status: COMPLETED | OUTPATIENT
Start: 2017-12-14 | End: 2017-12-14

## 2017-12-14 RX ORDER — IBUPROFEN 200 MG
200 TABLET ORAL EVERY 6 HOURS PRN
COMMUNITY
End: 2020-09-08

## 2017-12-14 RX ORDER — ACETAMINOPHEN 325 MG/1
650 TABLET ORAL EVERY 6 HOURS PRN
Qty: 60 TABLET | Refills: 0 | Status: SHIPPED | OUTPATIENT
Start: 2017-12-14

## 2017-12-14 RX ADMIN — ONDANSETRON 4 MG: 4 TABLET, ORALLY DISINTEGRATING ORAL at 11:12

## 2017-12-14 RX ADMIN — ACETAMINOPHEN 1000 MG: 500 TABLET ORAL at 01:12

## 2017-12-14 RX ADMIN — SODIUM CHLORIDE 1000 ML: 0.9 INJECTION, SOLUTION INTRAVENOUS at 01:12

## 2017-12-14 RX ADMIN — SODIUM CHLORIDE 1000 ML: 0.9 INJECTION, SOLUTION INTRAVENOUS at 11:12

## 2017-12-14 NOTE — ED PROVIDER NOTES
"Encounter Date: 2017    SCRIBE #1 NOTE: I, Abdirizak Ralph, am scribing for, and in the presence of,  Marla Myers NP. I have scribed the following portions of the note - Other sections scribed: HPI and ROS.       History     Chief Complaint   Patient presents with    Generalized Body Aches     with nausea, emesis, diarrhea, sore throat, nasal congestion x 3 days. " I haven't eaten in 3 days. I can't keep anything down. I drank an Ensure this morning."     CC: Generalized Body Aches     HPI: This 37 y.o F (LMP 17) with bipolar 1 disorder, HTN and seizures presents to the ED via EMS c/o severe (8/10) generalized weakness with associated diarrhea, nausea and emesis. The pt also reports chest pain with deep inhalation. The pt's daughter was recently dx with influenza. She also notes her niece and nephew have a cough. The pt denies SOB, fever, cough, chills and diaphoresis. No prior tx.       The history is provided by the patient. No  was used.     Review of patient's allergies indicates:   Allergen Reactions    Oranges [orange]      Past Medical History:   Diagnosis Date    Bipolar 1 disorder     DM mellitus, gestational     Hypertension     Seizures      Past Surgical History:   Procedure Laterality Date     SECTION, LOW TRANSVERSE      x 3    ECTOPIC PREGNANCY SURGERY      KNEE SURGERY  2010    left knee    SALPINGECTOMY      THYROID SURGERY      TUBAL LIGATION       Family History   Problem Relation Age of Onset    Heart disease Mother     Cancer Mother      Breast Cancer    Cancer Father      Throat Cancer    Cancer Maternal Grandmother     Cancer Maternal Grandfather      Social History   Substance Use Topics    Smoking status: Current Some Day Smoker     Types: Cigars    Smokeless tobacco: Never Used      Comment: Patient states she smokes 1 cigar per week.    Alcohol use 2.4 oz/week     4 Shots of liquor per week     Review of Systems "   Constitutional: Negative for chills, diaphoresis and fever.   HENT: Negative for rhinorrhea and sore throat.    Eyes: Negative for redness.   Respiratory: Negative for cough and shortness of breath.    Cardiovascular: Positive for chest pain (with deep inhilation).   Gastrointestinal: Positive for diarrhea, nausea and vomiting.   Genitourinary: Negative for dysuria, frequency and urgency.   Musculoskeletal: Positive for myalgias (generalized body aches). Negative for back pain and neck pain.   Skin: Negative for rash.   Psychiatric/Behavioral: The patient is not nervous/anxious.        Physical Exam     Initial Vitals [12/14/17 1122]   BP Pulse Resp Temp SpO2   132/85 (!) 119 18 99 °F (37.2 °C) 100 %      MAP       100.67         Physical Exam    Nursing note and vitals reviewed.  Constitutional: She appears well-developed and well-nourished.   HENT:   Head: Normocephalic.   Mouth/Throat: No oropharyngeal exudate.   Posterior pharynx red with elongated and shiny uvula   Eyes: Conjunctivae are normal.   Neck: Normal range of motion. Neck supple.   Cardiovascular: Normal rate, regular rhythm and normal heart sounds.   Pulmonary/Chest: Breath sounds normal. She exhibits no tenderness.   Abdominal: Soft. There is no tenderness.   Musculoskeletal: Normal range of motion.   Lymphadenopathy:     She has no cervical adenopathy.   Neurological: She is alert and oriented to person, place, and time.   Skin: Skin is warm and dry. Capillary refill takes less than 2 seconds.         ED Course   Procedures  Labs Reviewed   URINALYSIS - Abnormal; Notable for the following:        Result Value    Protein, UA 1+ (*)     Urobilinogen, UA 4.0-6.0 (*)     All other components within normal limits   URINALYSIS MICROSCOPIC - Abnormal; Notable for the following:     Bacteria, UA Few (*)     All other components within normal limits   THROAT SCREEN, RAPID   CULTURE, STREP A,  THROAT   INFLUENZA A AND B ANTIGEN             Medical Decision  Making:   Initial Assessment:   37-year-old female presents with body aches, vomiting, diarrhea and chills ×3 days  Differential Diagnosis:   Dehydration  Influenza  Gastroenteritis  ED Management:  Diagnosis management comments: This is an urgent evaluation of a 37-year-old female that presented to the ER with c/o chills, body aches, vomiting, diarrhea and sore throat ×3 days . Pts exam was as above.     Labs were reviewed and discussed with pt.     Based on exam today I believe patient was dehydrated.  I have addressed this with the 2 L of normal saline and Zofran.  I have low suspicion for medical, surgical or other life threatening condition and I believe pt is safe for discharge and outpatient f/u.  Encourage patient to continue pushing fluids, and will discharge her with Tylenol and Zofran.    Pt verbalizes understanding of d/c instructions and will return for worsening condition.    Case discussed with attending who agrees with assessment and plan.               Scribe Attestation:   Scribe #1: I performed the above scribed service and the documentation accurately describes the services I performed. I attest to the accuracy of the note.    Attending Attestation:           Physician Attestation for Scribe:  Physician Attestation Statement for Scribe #1: I, Marla Myers NP, reviewed documentation, as scribed by Abdirizak Ralph in my presence, and it is both accurate and complete.                 ED Course      Clinical Impression:   The primary encounter diagnosis was Dehydration. A diagnosis of Viral illness was also pertinent to this visit.    Disposition:   Disposition: Discharged  Condition: Stable                        Marla Myers NP  12/14/17 6898

## 2017-12-14 NOTE — ED TRIAGE NOTES
Pt arrives by EMS on stretcher. Pt with c/o nausea, vomiting, diarrhea, and, cough, and  fever that began  Yesterday.

## 2017-12-16 LAB — BACTERIA THROAT CULT: NORMAL

## 2018-09-18 ENCOUNTER — HOSPITAL ENCOUNTER (EMERGENCY)
Facility: HOSPITAL | Age: 38
Discharge: HOME | End: 2018-09-19
Attending: EMERGENCY MEDICINE
Payer: MEDICAID

## 2018-09-18 DIAGNOSIS — S40.812A ABRASION OF LEFT UPPER ARM, INITIAL ENCOUNTER: Primary | ICD-10-CM

## 2018-09-18 PROCEDURE — 99284 EMERGENCY DEPT VISIT MOD MDM: CPT

## 2018-09-18 PROCEDURE — 25000003 PHARM REV CODE 250: Performed by: PHYSICIAN ASSISTANT

## 2018-09-18 RX ORDER — ACETAMINOPHEN 325 MG/1
650 TABLET ORAL
Status: COMPLETED | OUTPATIENT
Start: 2018-09-18 | End: 2018-09-18

## 2018-09-18 RX ADMIN — ACETAMINOPHEN 650 MG: 325 TABLET ORAL at 11:09

## 2018-09-18 RX ADMIN — BACITRACIN ZINC, NEOMYCIN SULFATE, AND POLYMYXIN B SULFATE 1 EACH: 400; 3.5; 5 OINTMENT TOPICAL at 11:09

## 2018-09-19 VITALS
RESPIRATION RATE: 18 BRPM | DIASTOLIC BLOOD PRESSURE: 96 MMHG | TEMPERATURE: 98 F | SYSTOLIC BLOOD PRESSURE: 156 MMHG | HEIGHT: 66 IN | WEIGHT: 196 LBS | HEART RATE: 88 BPM | OXYGEN SATURATION: 98 % | BODY MASS INDEX: 31.5 KG/M2
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
## 2020-09-08 ENCOUNTER — HOSPITAL ENCOUNTER (EMERGENCY)
Facility: HOSPITAL | Age: 40
Discharge: HOME | End: 2020-09-08
Attending: EMERGENCY MEDICINE
Payer: MEDICAID

## 2020-09-08 VITALS
OXYGEN SATURATION: 98 % | DIASTOLIC BLOOD PRESSURE: 86 MMHG | WEIGHT: 201 LBS | BODY MASS INDEX: 32.3 KG/M2 | HEART RATE: 78 BPM | HEIGHT: 66 IN | SYSTOLIC BLOOD PRESSURE: 142 MMHG | TEMPERATURE: 98 F | RESPIRATION RATE: 18 BRPM
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
## 2020-09-08 DIAGNOSIS — S60.00XA CONTUSION OF FINGER WITHOUT DAMAGE TO NAIL, UNSPECIFIED FINGER, INITIAL ENCOUNTER: Primary | ICD-10-CM

## 2020-09-08 DIAGNOSIS — S60.449A TIGHT RING ON FINGER: ICD-10-CM

## 2020-09-08 PROCEDURE — 64450 NJX AA&/STRD OTHER PN/BRANCH: CPT | Mod: ER

## 2020-09-08 PROCEDURE — 25000003 PHARM REV CODE 250: Mod: ER | Performed by: NURSE PRACTITIONER

## 2020-09-08 PROCEDURE — 99284 EMERGENCY DEPT VISIT MOD MDM: CPT | Mod: 25,ER

## 2020-09-08 RX ORDER — LOSARTAN POTASSIUM 25 MG/1
50 TABLET ORAL
Status: COMPLETED | OUTPATIENT
Start: 2020-09-08 | End: 2020-09-08

## 2020-09-08 RX ORDER — LIDOCAINE HYDROCHLORIDE 10 MG/ML
10 INJECTION INFILTRATION; PERINEURAL
Status: COMPLETED | OUTPATIENT
Start: 2020-09-08 | End: 2020-09-08

## 2020-09-08 RX ORDER — KETOROLAC TROMETHAMINE 10 MG/1
10 TABLET, FILM COATED ORAL
Status: COMPLETED | OUTPATIENT
Start: 2020-09-08 | End: 2020-09-08

## 2020-09-08 RX ORDER — LOSARTAN POTASSIUM AND HYDROCHLOROTHIAZIDE 25; 100 MG/1; MG/1
1 TABLET ORAL DAILY
Qty: 14 TABLET | Refills: 0 | Status: SHIPPED | OUTPATIENT
Start: 2020-09-08 | End: 2021-05-21 | Stop reason: SDUPTHER

## 2020-09-08 RX ORDER — IBUPROFEN 800 MG/1
800 TABLET ORAL EVERY 6 HOURS PRN
Qty: 20 TABLET | Refills: 0 | OUTPATIENT
Start: 2020-09-08 | End: 2021-11-29

## 2020-09-08 RX ADMIN — LIDOCAINE HYDROCHLORIDE 10 ML: 10 INJECTION, SOLUTION INFILTRATION; PERINEURAL at 07:09

## 2020-09-08 RX ADMIN — KETOROLAC TROMETHAMINE 10 MG: 10 TABLET, FILM COATED ORAL at 08:09

## 2020-09-08 RX ADMIN — LOSARTAN POTASSIUM 50 MG: 25 TABLET ORAL at 08:09
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
## 2020-10-12 ENCOUNTER — HOSPITAL ENCOUNTER (EMERGENCY)
Facility: HOSPITAL | Age: 40
Discharge: HOME | End: 2020-10-12
Attending: EMERGENCY MEDICINE
Payer: MEDICAID

## 2020-10-12 VITALS
DIASTOLIC BLOOD PRESSURE: 96 MMHG | HEIGHT: 66 IN | RESPIRATION RATE: 16 BRPM | SYSTOLIC BLOOD PRESSURE: 148 MMHG | OXYGEN SATURATION: 98 % | BODY MASS INDEX: 31.34 KG/M2 | TEMPERATURE: 98 F | WEIGHT: 195 LBS | HEART RATE: 86 BPM

## 2020-10-12 DIAGNOSIS — Z20.2 STD EXPOSURE: Primary | ICD-10-CM

## 2020-10-12 LAB
B-HCG UR QL: NEGATIVE
CTP QC/QA: YES

## 2020-10-12 PROCEDURE — 86592 SYPHILIS TEST NON-TREP QUAL: CPT

## 2020-10-12 PROCEDURE — 99284 EMERGENCY DEPT VISIT MOD MDM: CPT | Mod: 25,ER

## 2020-10-12 PROCEDURE — 81025 URINE PREGNANCY TEST: CPT | Mod: ER | Performed by: EMERGENCY MEDICINE

## 2020-10-12 PROCEDURE — 63600175 PHARM REV CODE 636 W HCPCS: Mod: JG,ER | Performed by: PHYSICIAN ASSISTANT

## 2020-10-12 PROCEDURE — 96372 THER/PROPH/DIAG INJ SC/IM: CPT | Mod: ER

## 2020-10-12 RX ADMIN — PENICILLIN G BENZATHINE 2.4 MILLION UNITS: 1200000 INJECTION, SUSPENSION INTRAMUSCULAR at 12:10
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
## 2020-10-14 LAB — RPR SER QL: NORMAL

## 2021-04-27 ENCOUNTER — HOSPITAL ENCOUNTER (EMERGENCY)
Facility: HOSPITAL | Age: 41
Discharge: HOME | End: 2021-04-27
Attending: EMERGENCY MEDICINE
Payer: MEDICAID

## 2021-04-27 VITALS
SYSTOLIC BLOOD PRESSURE: 141 MMHG | RESPIRATION RATE: 18 BRPM | OXYGEN SATURATION: 96 % | TEMPERATURE: 99 F | DIASTOLIC BLOOD PRESSURE: 87 MMHG | HEART RATE: 88 BPM

## 2021-04-27 DIAGNOSIS — F16.10 ECSTASY ABUSE: ICD-10-CM

## 2021-04-27 DIAGNOSIS — R41.82 ALTERED MENTAL STATE: ICD-10-CM

## 2021-04-27 DIAGNOSIS — F10.10 ALCOHOL ABUSE: ICD-10-CM
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
## 2021-04-27 DIAGNOSIS — T40.601A OPIATE OVERDOSE, ACCIDENTAL OR UNINTENTIONAL, INITIAL ENCOUNTER: Primary | ICD-10-CM

## 2021-04-27 LAB
ALBUMIN SERPL BCP-MCNC: 3.8 G/DL (ref 3.5–5.2)
ALP SERPL-CCNC: 58 U/L (ref 55–135)
ALT SERPL W/O P-5'-P-CCNC: 44 U/L (ref 10–44)
AMPHET+METHAMPHET UR QL: NORMAL
ANION GAP SERPL CALC-SCNC: 14 MMOL/L (ref 8–16)
APAP SERPL-MCNC: <3 UG/ML (ref 10–20)
AST SERPL-CCNC: 47 U/L (ref 10–40)
B-HCG UR QL: NEGATIVE
B-HCG UR QL: NEGATIVE
BACTERIA #/AREA URNS HPF: NORMAL /HPF
BARBITURATES UR QL SCN>200 NG/ML: NEGATIVE
BASOPHILS # BLD AUTO: 0.03 K/UL (ref 0–0.2)
BASOPHILS NFR BLD: 0.4 % (ref 0–1.9)
BENZODIAZ UR QL SCN>200 NG/ML: NEGATIVE
BILIRUB SERPL-MCNC: 0.2 MG/DL (ref 0.1–1)
BILIRUB UR QL STRIP: NEGATIVE
BUN SERPL-MCNC: 15 MG/DL (ref 6–20)
BZE UR QL SCN: NEGATIVE
CALCIUM SERPL-MCNC: 8.5 MG/DL (ref 8.7–10.5)
CANNABINOIDS UR QL SCN: NEGATIVE
CHLORIDE SERPL-SCNC: 109 MMOL/L (ref 95–110)
CLARITY UR: CLEAR
CO2 SERPL-SCNC: 19 MMOL/L (ref 23–29)
COLOR UR: YELLOW
CREAT SERPL-MCNC: 1.2 MG/DL (ref 0.5–1.4)
CREAT UR-MCNC: 81.4 MG/DL (ref 15–325)
CTP QC/QA: YES
DIFFERENTIAL METHOD: ABNORMAL
EOSINOPHIL # BLD AUTO: 0.2 K/UL (ref 0–0.5)
EOSINOPHIL NFR BLD: 3.2 % (ref 0–8)
ERYTHROCYTE [DISTWIDTH] IN BLOOD BY AUTOMATED COUNT: 12.3 % (ref 11.5–14.5)
EST. GFR  (AFRICAN AMERICAN): >60 ML/MIN/1.73 M^2
EST. GFR  (NON AFRICAN AMERICAN): 56 ML/MIN/1.73 M^2
ETHANOL SERPL-MCNC: 153 MG/DL
GLUCOSE SERPL-MCNC: 171 MG/DL (ref 70–110)
GLUCOSE SERPL-MCNC: 190 MG/DL (ref 70–110)
GLUCOSE UR QL STRIP: ABNORMAL
HCT VFR BLD AUTO: 38.5 % (ref 37–48.5)
HGB BLD-MCNC: 12.8 G/DL (ref 12–16)
HGB UR QL STRIP: NEGATIVE
HYALINE CASTS #/AREA URNS LPF: 0 /LPF
IMM GRANULOCYTES # BLD AUTO: 0.06 K/UL (ref 0–0.04)
IMM GRANULOCYTES NFR BLD AUTO: 0.8 % (ref 0–0.5)
KETONES UR QL STRIP: NEGATIVE
LEUKOCYTE ESTERASE UR QL STRIP: NEGATIVE
LYMPHOCYTES # BLD AUTO: 2.4 K/UL (ref 1–4.8)
LYMPHOCYTES NFR BLD: 32.6 % (ref 18–48)
MCH RBC QN AUTO: 32.2 PG (ref 27–31)
MCHC RBC AUTO-ENTMCNC: 33.2 G/DL (ref 32–36)
MCV RBC AUTO: 97 FL (ref 82–98)
METHADONE UR QL SCN>300 NG/ML: NEGATIVE
MICROSCOPIC COMMENT: NORMAL
MONOCYTES # BLD AUTO: 0.6 K/UL (ref 0.3–1)
MONOCYTES NFR BLD: 8.3 % (ref 4–15)
NEUTROPHILS # BLD AUTO: 4.1 K/UL (ref 1.8–7.7)
NEUTROPHILS NFR BLD: 54.7 % (ref 38–73)
NITRITE UR QL STRIP: NEGATIVE
NRBC BLD-RTO: 0 /100 WBC
OPIATES UR QL SCN: NORMAL
PCP UR QL SCN>25 NG/ML: NEGATIVE
PH UR STRIP: 6 [PH] (ref 5–8)
PLATELET # BLD AUTO: 328 K/UL (ref 150–450)
PMV BLD AUTO: 9.7 FL (ref 9.2–12.9)
POTASSIUM SERPL-SCNC: 3.7 MMOL/L (ref 3.5–5.1)
PROT SERPL-MCNC: 8 G/DL (ref 6–8.4)
PROT UR QL STRIP: ABNORMAL
RBC # BLD AUTO: 3.98 M/UL (ref 4–5.4)
RBC #/AREA URNS HPF: 2 /HPF (ref 0–4)
SALICYLATES SERPL-MCNC: <5 MG/DL (ref 15–30)
SARS-COV-2 RDRP RESP QL NAA+PROBE: NEGATIVE
SODIUM SERPL-SCNC: 142 MMOL/L (ref 136–145)
SP GR UR STRIP: 1.01 (ref 1–1.03)
SQUAMOUS #/AREA URNS HPF: 0 /HPF
TOXICOLOGY INFORMATION: NORMAL
URN SPEC COLLECT METH UR: ABNORMAL
UROBILINOGEN UR STRIP-ACNC: NEGATIVE EU/DL
WBC # BLD AUTO: 7.49 K/UL (ref 3.9–12.7)
WBC #/AREA URNS HPF: 1 /HPF (ref 0–5)

## 2021-04-27 PROCEDURE — 93010 ELECTROCARDIOGRAM REPORT: CPT | Mod: ,,, | Performed by: INTERNAL MEDICINE

## 2021-04-27 PROCEDURE — 81000 URINALYSIS NONAUTO W/SCOPE: CPT | Mod: 59 | Performed by: EMERGENCY MEDICINE

## 2021-04-27 PROCEDURE — 80307 DRUG TEST PRSMV CHEM ANLYZR: CPT | Performed by: EMERGENCY MEDICINE

## 2021-04-27 PROCEDURE — 25000003 PHARM REV CODE 250: Performed by: EMERGENCY MEDICINE

## 2021-04-27 PROCEDURE — 85025 COMPLETE CBC W/AUTO DIFF WBC: CPT | Performed by: EMERGENCY MEDICINE

## 2021-04-27 PROCEDURE — 80143 DRUG ASSAY ACETAMINOPHEN: CPT | Performed by: EMERGENCY MEDICINE

## 2021-04-27 PROCEDURE — 80179 DRUG ASSAY SALICYLATE: CPT | Performed by: EMERGENCY MEDICINE

## 2021-04-27 PROCEDURE — 93005 ELECTROCARDIOGRAM TRACING: CPT

## 2021-04-27 PROCEDURE — 93010 EKG 12-LEAD: ICD-10-PCS | Mod: ,,, | Performed by: INTERNAL MEDICINE

## 2021-04-27 PROCEDURE — 99285 EMERGENCY DEPT VISIT HI MDM: CPT | Mod: 25

## 2021-04-27 PROCEDURE — U0002 COVID-19 LAB TEST NON-CDC: HCPCS | Performed by: EMERGENCY MEDICINE

## 2021-04-27 PROCEDURE — 63600175 PHARM REV CODE 636 W HCPCS: Performed by: EMERGENCY MEDICINE

## 2021-04-27 PROCEDURE — 96361 HYDRATE IV INFUSION ADD-ON: CPT

## 2021-04-27 PROCEDURE — 82077 ASSAY SPEC XCP UR&BREATH IA: CPT | Performed by: EMERGENCY MEDICINE

## 2021-04-27 PROCEDURE — 96374 THER/PROPH/DIAG INJ IV PUSH: CPT

## 2021-04-27 PROCEDURE — 96375 TX/PRO/DX INJ NEW DRUG ADDON: CPT

## 2021-04-27 PROCEDURE — 80053 COMPREHEN METABOLIC PANEL: CPT | Performed by: EMERGENCY MEDICINE

## 2021-04-27 PROCEDURE — 25000003 PHARM REV CODE 250: Performed by: PHYSICIAN ASSISTANT

## 2021-04-27 PROCEDURE — 63600175 PHARM REV CODE 636 W HCPCS: Performed by: PHYSICIAN ASSISTANT

## 2021-04-27 PROCEDURE — P9612 CATHETERIZE FOR URINE SPEC: HCPCS

## 2021-04-27 PROCEDURE — 81025 URINE PREGNANCY TEST: CPT | Performed by: EMERGENCY MEDICINE

## 2021-04-27 RX ORDER — NALOXONE HCL 0.4 MG/ML
1 VIAL (ML) INJECTION
Status: COMPLETED | OUTPATIENT
Start: 2021-04-27 | End: 2021-04-27

## 2021-04-27 RX ORDER — ONDANSETRON 2 MG/ML
4 INJECTION INTRAMUSCULAR; INTRAVENOUS
Status: COMPLETED | OUTPATIENT
Start: 2021-04-27 | End: 2021-04-27

## 2021-04-27 RX ORDER — FAMOTIDINE 10 MG/ML
20 INJECTION INTRAVENOUS
Status: COMPLETED | OUTPATIENT
Start: 2021-04-27 | End: 2021-04-27

## 2021-04-27 RX ORDER — NALOXONE HYDROCHLORIDE 4 MG/.1ML
SPRAY NASAL
Qty: 1 EACH | Refills: 11 | Status: SHIPPED | OUTPATIENT
Start: 2021-04-27

## 2021-04-27 RX ORDER — NALOXONE HCL 0.4 MG/ML
2 VIAL (ML) INJECTION
Status: COMPLETED | OUTPATIENT
Start: 2021-04-27 | End: 2021-04-27

## 2021-04-27 RX ADMIN — FAMOTIDINE 20 MG: 10 INJECTION INTRAVENOUS at 11:04

## 2021-04-27 RX ADMIN — NALXONE HYDROCHLORIDE 1 MG: 0.4 INJECTION INTRAMUSCULAR; INTRAVENOUS; SUBCUTANEOUS at 09:04

## 2021-04-27 RX ADMIN — SODIUM CHLORIDE 1000 ML: 0.9 INJECTION, SOLUTION INTRAVENOUS at 09:04

## 2021-04-27 RX ADMIN — NALOXONE HYDROCHLORIDE 2 MG: 0.4 INJECTION, SOLUTION INTRAMUSCULAR; INTRAVENOUS; SUBCUTANEOUS at 07:04

## 2021-04-27 RX ADMIN — ONDANSETRON 4 MG: 2 INJECTION INTRAMUSCULAR; INTRAVENOUS at 11:04

## 2021-05-21 ENCOUNTER — HOSPITAL ENCOUNTER (EMERGENCY)
Facility: HOSPITAL | Age: 41
Discharge: HOME | End: 2021-05-21
Attending: EMERGENCY MEDICINE
Payer: MEDICAID

## 2021-05-21 VITALS
HEIGHT: 66 IN | HEART RATE: 72 BPM | BODY MASS INDEX: 31.82 KG/M2 | RESPIRATION RATE: 18 BRPM | OXYGEN SATURATION: 99 % | SYSTOLIC BLOOD PRESSURE: 161 MMHG | WEIGHT: 198 LBS | DIASTOLIC BLOOD PRESSURE: 102 MMHG | TEMPERATURE: 98 F

## 2021-05-21 DIAGNOSIS — J06.9 VIRAL URI WITH COUGH: Primary | ICD-10-CM

## 2021-05-21 DIAGNOSIS — R11.10 POST-TUSSIVE EMESIS: ICD-10-CM

## 2021-05-21 DIAGNOSIS — Z76.0 MEDICATION REFILL: ICD-10-CM

## 2021-05-21 LAB
B-HCG UR QL: NEGATIVE
CTP QC/QA: YES
MOLECULAR STREP A: NEGATIVE
POC MOLECULAR INFLUENZA A AGN: NEGATIVE
POC MOLECULAR INFLUENZA B AGN: NEGATIVE
SARS-COV-2 RDRP RESP QL NAA+PROBE: NEGATIVE

## 2021-05-21 PROCEDURE — U0002 COVID-19 LAB TEST NON-CDC: HCPCS | Performed by: PHYSICIAN ASSISTANT

## 2021-05-21 PROCEDURE — 99284 EMERGENCY DEPT VISIT MOD MDM: CPT | Mod: 25

## 2021-05-21 PROCEDURE — 81025 URINE PREGNANCY TEST: CPT | Performed by: PHYSICIAN ASSISTANT

## 2021-05-21 PROCEDURE — 82962 GLUCOSE BLOOD TEST: CPT

## 2021-05-21 PROCEDURE — 87502 INFLUENZA DNA AMP PROBE: CPT

## 2021-05-21 PROCEDURE — 25000003 PHARM REV CODE 250: Performed by: PHYSICIAN ASSISTANT

## 2021-05-21 RX ORDER — BENZONATATE 100 MG/1
100 CAPSULE ORAL 3 TIMES DAILY PRN
Qty: 20 CAPSULE | Refills: 0 | Status: SHIPPED | OUTPATIENT
Start: 2021-05-21 | End: 2021-05-31

## 2021-05-21 RX ORDER — ALBUTEROL SULFATE 90 UG/1
1-2 AEROSOL, METERED RESPIRATORY (INHALATION) EVERY 6 HOURS PRN
Qty: 6.7 G | Refills: 1 | Status: SHIPPED | OUTPATIENT
Start: 2021-05-21 | End: 2021-11-29 | Stop reason: ALTCHOICE

## 2021-05-21 RX ORDER — ONDANSETRON 4 MG/1
8 TABLET, FILM COATED ORAL EVERY 6 HOURS PRN
Qty: 30 TABLET | Refills: 0 | Status: SHIPPED | OUTPATIENT
Start: 2021-05-21 | End: 2021-06-20

## 2021-05-21 RX ORDER — LOSARTAN POTASSIUM AND HYDROCHLOROTHIAZIDE 25; 100 MG/1; MG/1
1 TABLET ORAL DAILY
Qty: 30 TABLET | Refills: 0 | Status: SHIPPED | OUTPATIENT
Start: 2021-05-21 | End: 2021-06-20

## 2021-05-21 RX ORDER — ACETAMINOPHEN 500 MG
1000 TABLET ORAL
Status: COMPLETED | OUTPATIENT
Start: 2021-05-21 | End: 2021-05-21

## 2021-05-21 RX ORDER — ONDANSETRON 8 MG/1
8 TABLET, ORALLY DISINTEGRATING ORAL
Status: COMPLETED | OUTPATIENT
Start: 2021-05-21 | End: 2021-05-21

## 2021-05-21 RX ADMIN — ACETAMINOPHEN 1000 MG: 500 TABLET, FILM COATED ORAL at 08:05

## 2021-05-21 RX ADMIN — ONDANSETRON 8 MG: 8 TABLET, ORALLY DISINTEGRATING ORAL at 08:05

## 2021-05-22 LAB — POCT GLUCOSE: 86 MG/DL (ref 70–110)

## 2021-07-18 ENCOUNTER — HOSPITAL ENCOUNTER (EMERGENCY)
Facility: HOSPITAL | Age: 41
Discharge: HOME | End: 2021-07-18
Attending: EMERGENCY MEDICINE
Payer: MEDICAID

## 2021-07-18 VITALS
HEIGHT: 66 IN | BODY MASS INDEX: 31.96 KG/M2 | OXYGEN SATURATION: 99 % | TEMPERATURE: 98 F | RESPIRATION RATE: 17 BRPM | DIASTOLIC BLOOD PRESSURE: 108 MMHG | SYSTOLIC BLOOD PRESSURE: 165 MMHG | HEART RATE: 75 BPM

## 2021-07-18 DIAGNOSIS — J40 BRONCHITIS WITH ACUTE WHEEZING: Primary | ICD-10-CM

## 2021-07-18 DIAGNOSIS — J06.9 VIRAL URI: ICD-10-CM

## 2021-07-18 LAB
CTP QC/QA: YES
CTP QC/QA: YES
MOLECULAR STREP A: NEGATIVE
POCT GLUCOSE: 103 MG/DL (ref 70–110)
SARS-COV-2 RDRP RESP QL NAA+PROBE: NEGATIVE

## 2021-07-18 PROCEDURE — 63600175 PHARM REV CODE 636 W HCPCS: Performed by: EMERGENCY MEDICINE

## 2021-07-18 PROCEDURE — 94640 AIRWAY INHALATION TREATMENT: CPT

## 2021-07-18 PROCEDURE — 94761 N-INVAS EAR/PLS OXIMETRY MLT: CPT

## 2021-07-18 PROCEDURE — 63600175 PHARM REV CODE 636 W HCPCS: Performed by: PHYSICIAN ASSISTANT

## 2021-07-18 PROCEDURE — 82962 GLUCOSE BLOOD TEST: CPT

## 2021-07-18 PROCEDURE — U0002 COVID-19 LAB TEST NON-CDC: HCPCS | Performed by: PHYSICIAN ASSISTANT

## 2021-07-18 PROCEDURE — 25000003 PHARM REV CODE 250: Performed by: PHYSICIAN ASSISTANT

## 2021-07-18 PROCEDURE — 25000242 PHARM REV CODE 250 ALT 637 W/ HCPCS: Performed by: PHYSICIAN ASSISTANT

## 2021-07-18 PROCEDURE — 99284 EMERGENCY DEPT VISIT MOD MDM: CPT | Mod: 25

## 2021-07-18 RX ORDER — PREDNISONE 20 MG/1
60 TABLET ORAL
Status: COMPLETED | OUTPATIENT
Start: 2021-07-18 | End: 2021-07-18

## 2021-07-18 RX ORDER — PREDNISONE 20 MG/1
60 TABLET ORAL DAILY
Qty: 9 TABLET | Refills: 0 | Status: SHIPPED | OUTPATIENT
Start: 2021-07-18 | End: 2021-07-21

## 2021-07-18 RX ORDER — IPRATROPIUM BROMIDE AND ALBUTEROL SULFATE 2.5; .5 MG/3ML; MG/3ML
3 SOLUTION RESPIRATORY (INHALATION)
Status: COMPLETED | OUTPATIENT
Start: 2021-07-18 | End: 2021-07-18

## 2021-07-18 RX ORDER — ESCITALOPRAM OXALATE 20 MG/1
20 TABLET ORAL EVERY MORNING
COMMUNITY
Start: 2021-06-20

## 2021-07-18 RX ORDER — QUETIAPINE FUMARATE 300 MG/1
300 TABLET, FILM COATED ORAL NIGHTLY
Status: ON HOLD | COMMUNITY
Start: 2021-06-20 | End: 2022-05-09 | Stop reason: HOSPADM

## 2021-07-18 RX ORDER — LOSARTAN POTASSIUM AND HYDROCHLOROTHIAZIDE 12.5; 1 MG/1; MG/1
1 TABLET ORAL DAILY
COMMUNITY
Start: 2021-05-22 | End: 2022-05-07

## 2021-07-18 RX ORDER — ONDANSETRON 8 MG/1
8 TABLET, ORALLY DISINTEGRATING ORAL
Status: COMPLETED | OUTPATIENT
Start: 2021-07-18 | End: 2021-07-18

## 2021-07-18 RX ORDER — DEXTROAMPHETAMINE SACCHARATE, AMPHETAMINE ASPARTATE, DEXTROAMPHETAMINE SULFATE AND AMPHETAMINE SULFATE 7.5; 7.5; 7.5; 7.5 MG/1; MG/1; MG/1; MG/1
1 TABLET ORAL 2 TIMES DAILY
COMMUNITY
Start: 2021-06-20

## 2021-07-18 RX ORDER — BENZONATATE 100 MG/1
100 CAPSULE ORAL 3 TIMES DAILY PRN
Qty: 20 CAPSULE | Refills: 0 | Status: SHIPPED | OUTPATIENT
Start: 2021-07-18 | End: 2021-07-28

## 2021-07-18 RX ORDER — ALBUTEROL SULFATE 0.83 MG/ML
2.5 SOLUTION RESPIRATORY (INHALATION) EVERY 6 HOURS PRN
Qty: 1 BOX | Refills: 0 | OUTPATIENT
Start: 2021-07-18 | End: 2021-11-29

## 2021-07-18 RX ORDER — PEDI MULTIVIT NO.27/FOLIC ACID 100 MCG
2 TABLET,CHEWABLE ORAL EVERY 4 HOURS PRN
Qty: 24 EACH | Refills: 0 | Status: SHIPPED | OUTPATIENT
Start: 2021-07-18 | End: 2021-07-25

## 2021-07-18 RX ORDER — ACETAMINOPHEN 500 MG
1000 TABLET ORAL
Status: COMPLETED | OUTPATIENT
Start: 2021-07-18 | End: 2021-07-18

## 2021-07-18 RX ORDER — ALPRAZOLAM 1 MG/1
1 TABLET ORAL
COMMUNITY
Start: 2021-06-21

## 2021-07-18 RX ORDER — ALBUTEROL SULFATE 90 UG/1
1-2 AEROSOL, METERED RESPIRATORY (INHALATION) EVERY 6 HOURS PRN
Qty: 6.7 G | Refills: 1 | Status: SHIPPED | OUTPATIENT
Start: 2021-07-18 | End: 2021-11-29 | Stop reason: SDUPTHER

## 2021-07-18 RX ORDER — ONDANSETRON 4 MG/1
8 TABLET, FILM COATED ORAL EVERY 6 HOURS PRN
Qty: 30 TABLET | Refills: 0 | Status: SHIPPED | OUTPATIENT
Start: 2021-07-18 | End: 2021-08-17

## 2021-07-18 RX ADMIN — ACETAMINOPHEN 1000 MG: 500 TABLET ORAL at 10:07

## 2021-07-18 RX ADMIN — PREDNISONE 60 MG: 20 TABLET ORAL at 11:07

## 2021-07-18 RX ADMIN — IPRATROPIUM BROMIDE AND ALBUTEROL SULFATE 3 ML: .5; 3 SOLUTION RESPIRATORY (INHALATION) at 12:07

## 2021-07-18 RX ADMIN — ONDANSETRON 8 MG: 8 TABLET, ORALLY DISINTEGRATING ORAL at 11:07

## 2021-11-29 ENCOUNTER — HOSPITAL ENCOUNTER (EMERGENCY)
Facility: HOSPITAL | Age: 41
Discharge: HOME | End: 2021-11-29
Attending: EMERGENCY MEDICINE
Payer: MEDICAID

## 2021-11-29 VITALS
RESPIRATION RATE: 20 BRPM | SYSTOLIC BLOOD PRESSURE: 159 MMHG | OXYGEN SATURATION: 99 % | TEMPERATURE: 98 F | HEART RATE: 70 BPM | DIASTOLIC BLOOD PRESSURE: 89 MMHG

## 2021-11-29 DIAGNOSIS — Z91.199 PERSONAL HISTORY OF NONCOMPLIANCE WITH MEDICAL TREATMENT, PRESENTING HAZARDS TO HEALTH: ICD-10-CM

## 2021-11-29 DIAGNOSIS — S29.019A ACUTE THORACIC MYOFASCIAL STRAIN, INITIAL ENCOUNTER: ICD-10-CM
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
## 2021-11-29 DIAGNOSIS — G40.909 SEIZURE DISORDER: ICD-10-CM

## 2021-11-29 DIAGNOSIS — N30.01 ACUTE CYSTITIS WITH HEMATURIA: ICD-10-CM

## 2021-11-29 DIAGNOSIS — M54.6 THORACIC BACK PAIN: ICD-10-CM

## 2021-11-29 LAB
AMPHET+METHAMPHET UR QL: ABNORMAL
B-HCG UR QL: NEGATIVE
BACTERIA #/AREA URNS HPF: ABNORMAL /HPF
BARBITURATES UR QL SCN>200 NG/ML: NEGATIVE
BENZODIAZ UR QL SCN>200 NG/ML: NEGATIVE
BILIRUB UR QL STRIP: NEGATIVE
BZE UR QL SCN: ABNORMAL
CANNABINOIDS UR QL SCN: NEGATIVE
CLARITY UR: ABNORMAL
COLOR UR: YELLOW
CREAT UR-MCNC: 220.1 MG/DL (ref 15–325)
CTP QC/QA: YES
GLUCOSE UR QL STRIP: NEGATIVE
HGB UR QL STRIP: NEGATIVE
KETONES UR QL STRIP: NEGATIVE
LEUKOCYTE ESTERASE UR QL STRIP: ABNORMAL
METHADONE UR QL SCN>300 NG/ML: NEGATIVE
MICROSCOPIC COMMENT: ABNORMAL
NITRITE UR QL STRIP: POSITIVE
NON-SQ EPI CELLS #/AREA URNS HPF: 2 /HPF
OPIATES UR QL SCN: NEGATIVE
PCP UR QL SCN>25 NG/ML: NEGATIVE
PH UR STRIP: 6 [PH] (ref 5–8)
POCT GLUCOSE: 86 MG/DL (ref 70–110)
PROT UR QL STRIP: ABNORMAL
RBC #/AREA URNS HPF: 3 /HPF (ref 0–4)
SP GR UR STRIP: 1.02 (ref 1–1.03)
TOXICOLOGY INFORMATION: ABNORMAL
URN SPEC COLLECT METH UR: ABNORMAL
UROBILINOGEN UR STRIP-ACNC: ABNORMAL EU/DL
WBC #/AREA URNS HPF: >100 /HPF (ref 0–5)
WBC CLUMPS URNS QL MICRO: ABNORMAL

## 2021-11-29 PROCEDURE — 81025 URINE PREGNANCY TEST: CPT | Performed by: NURSE PRACTITIONER

## 2021-11-29 PROCEDURE — 87077 CULTURE AEROBIC IDENTIFY: CPT | Performed by: NURSE PRACTITIONER

## 2021-11-29 PROCEDURE — 81000 URINALYSIS NONAUTO W/SCOPE: CPT | Mod: 59 | Performed by: NURSE PRACTITIONER

## 2021-11-29 PROCEDURE — 25000003 PHARM REV CODE 250: Performed by: EMERGENCY MEDICINE

## 2021-11-29 PROCEDURE — 87088 URINE BACTERIA CULTURE: CPT | Performed by: NURSE PRACTITIONER

## 2021-11-29 PROCEDURE — 82962 GLUCOSE BLOOD TEST: CPT

## 2021-11-29 PROCEDURE — 87186 SC STD MICRODIL/AGAR DIL: CPT | Performed by: NURSE PRACTITIONER

## 2021-11-29 PROCEDURE — 80307 DRUG TEST PRSMV CHEM ANLYZR: CPT | Performed by: NURSE PRACTITIONER

## 2021-11-29 PROCEDURE — 99284 EMERGENCY DEPT VISIT MOD MDM: CPT | Mod: 25

## 2021-11-29 PROCEDURE — 87086 URINE CULTURE/COLONY COUNT: CPT | Performed by: NURSE PRACTITIONER

## 2021-11-29 RX ORDER — IBUPROFEN 600 MG/1
600 TABLET ORAL EVERY 6 HOURS PRN
Qty: 20 TABLET | Refills: 0 | Status: SHIPPED | OUTPATIENT
Start: 2021-11-29

## 2021-11-29 RX ORDER — HYDROCODONE BITARTRATE AND ACETAMINOPHEN 5; 325 MG/1; MG/1
1 TABLET ORAL EVERY 4 HOURS PRN
Qty: 8 TABLET | Refills: 0 | Status: SHIPPED | OUTPATIENT
Start: 2021-11-29 | End: 2021-12-09

## 2021-11-29 RX ORDER — ALBUTEROL SULFATE 0.83 MG/ML
2.5 SOLUTION RESPIRATORY (INHALATION) EVERY 6 HOURS PRN
Qty: 120 ML | Refills: 1 | OUTPATIENT
Start: 2021-11-29 | End: 2022-02-01

## 2021-11-29 RX ORDER — CEPHALEXIN 250 MG/1
1000 CAPSULE ORAL
Status: DISCONTINUED | OUTPATIENT
Start: 2021-11-29 | End: 2021-11-29

## 2021-11-29 RX ORDER — PHENYTOIN 125 MG/5ML
500 SUSPENSION ORAL
Status: DISCONTINUED | OUTPATIENT
Start: 2021-11-29 | End: 2021-11-29

## 2021-11-29 RX ORDER — PHENYTOIN SODIUM 100 MG/1
500 CAPSULE, EXTENDED RELEASE ORAL
Status: COMPLETED | OUTPATIENT
Start: 2021-11-29 | End: 2021-11-29

## 2021-11-29 RX ORDER — CEFTRIAXONE 1 G/1
1 INJECTION, POWDER, FOR SOLUTION INTRAMUSCULAR; INTRAVENOUS
Status: DISCONTINUED | OUTPATIENT
Start: 2021-11-29 | End: 2021-11-29

## 2021-11-29 RX ORDER — ALBUTEROL SULFATE 90 UG/1
1-2 AEROSOL, METERED RESPIRATORY (INHALATION) EVERY 6 HOURS PRN
Qty: 6.7 G | Refills: 1 | OUTPATIENT
Start: 2021-11-29 | End: 2022-02-01

## 2021-11-29 RX ORDER — CEPHALEXIN 250 MG/1
1000 CAPSULE ORAL
Status: COMPLETED | OUTPATIENT
Start: 2021-11-29 | End: 2021-11-29

## 2021-11-29 RX ORDER — PHENYTOIN SODIUM 100 MG/1
100 CAPSULE, EXTENDED RELEASE ORAL 3 TIMES DAILY
Qty: 90 CAPSULE | Refills: 11 | Status: SHIPPED | OUTPATIENT
Start: 2021-11-29 | End: 2022-11-29

## 2021-11-29 RX ORDER — CEPHALEXIN 500 MG/1
1000 CAPSULE ORAL EVERY 12 HOURS
Qty: 28 CAPSULE | Refills: 0 | Status: SHIPPED | OUTPATIENT
Start: 2021-11-29 | End: 2021-12-06

## 2021-11-29 RX ORDER — PHENYTOIN 125 MG/5ML
500 SUSPENSION ORAL
Status: COMPLETED | OUTPATIENT
Start: 2021-11-29 | End: 2021-11-29

## 2021-11-29 RX ADMIN — CEPHALEXIN 1000 MG: 250 CAPSULE ORAL at 01:11

## 2021-11-29 RX ADMIN — PHENYTOIN SODIUM 500 MG: 100 CAPSULE ORAL at 01:11

## 2021-11-29 RX ADMIN — PHENYTOIN 500 MG: 125 SUSPENSION ORAL at 11:11

## 2021-12-01 LAB — BACTERIA UR CULT: ABNORMAL

## 2022-02-01 ENCOUNTER — HOSPITAL ENCOUNTER (EMERGENCY)
Facility: HOSPITAL | Age: 42
Discharge: HOME | End: 2022-02-01
Attending: EMERGENCY MEDICINE
Payer: MEDICAID

## 2022-02-01 VITALS
TEMPERATURE: 101 F | WEIGHT: 189 LBS | OXYGEN SATURATION: 99 % | HEIGHT: 66 IN | BODY MASS INDEX: 30.37 KG/M2 | HEART RATE: 112 BPM | DIASTOLIC BLOOD PRESSURE: 98 MMHG | SYSTOLIC BLOOD PRESSURE: 185 MMHG | RESPIRATION RATE: 18 BRPM

## 2022-02-01 DIAGNOSIS — R50.9 ACUTE FEBRILE ILLNESS: ICD-10-CM

## 2022-02-01 DIAGNOSIS — R52 BODY ACHES: ICD-10-CM

## 2022-02-01 DIAGNOSIS — J10.1 INFLUENZA A: Primary | ICD-10-CM

## 2022-02-01 DIAGNOSIS — R05.9 COUGH: ICD-10-CM

## 2022-02-01 LAB
CTP QC/QA: YES
CTP QC/QA: YES
POC MOLECULAR INFLUENZA A AGN: POSITIVE
POC MOLECULAR INFLUENZA B AGN: NEGATIVE
SARS-COV-2 RDRP RESP QL NAA+PROBE: NEGATIVE
SARS-COV-2 RNA RESP QL NAA+PROBE: NOT DETECTED
SARS-COV-2- CYCLE NUMBER: NORMAL

## 2022-02-01 PROCEDURE — U0005 INFEC AGEN DETEC AMPLI PROBE: HCPCS | Performed by: EMERGENCY MEDICINE

## 2022-02-01 PROCEDURE — 63600175 PHARM REV CODE 636 W HCPCS: Performed by: PHYSICIAN ASSISTANT

## 2022-02-01 PROCEDURE — 99284 EMERGENCY DEPT VISIT MOD MDM: CPT | Mod: 25

## 2022-02-01 PROCEDURE — U0002 COVID-19 LAB TEST NON-CDC: HCPCS | Performed by: PHYSICIAN ASSISTANT

## 2022-02-01 PROCEDURE — 87502 INFLUENZA DNA AMP PROBE: CPT

## 2022-02-01 PROCEDURE — 25000242 PHARM REV CODE 250 ALT 637 W/ HCPCS: Performed by: PHYSICIAN ASSISTANT

## 2022-02-01 PROCEDURE — U0003 INFECTIOUS AGENT DETECTION BY NUCLEIC ACID (DNA OR RNA); SEVERE ACUTE RESPIRATORY SYNDROME CORONAVIRUS 2 (SARS-COV-2) (CORONAVIRUS DISEASE [COVID-19]), AMPLIFIED PROBE TECHNIQUE, MAKING USE OF HIGH THROUGHPUT TECHNOLOGIES AS DESCRIBED BY CMS-2020-01-R: HCPCS | Performed by: EMERGENCY MEDICINE

## 2022-02-01 PROCEDURE — 94640 AIRWAY INHALATION TREATMENT: CPT

## 2022-02-01 RX ORDER — PREDNISONE 20 MG/1
60 TABLET ORAL
Status: COMPLETED | OUTPATIENT
Start: 2022-02-01 | End: 2022-02-01

## 2022-02-01 RX ORDER — OSELTAMIVIR PHOSPHATE 75 MG/1
75 CAPSULE ORAL 2 TIMES DAILY
Qty: 10 CAPSULE | Refills: 0 | Status: SHIPPED | OUTPATIENT
Start: 2022-02-01 | End: 2022-02-06

## 2022-02-01 RX ORDER — IPRATROPIUM BROMIDE AND ALBUTEROL SULFATE 2.5; .5 MG/3ML; MG/3ML
3 SOLUTION RESPIRATORY (INHALATION)
Status: COMPLETED | OUTPATIENT
Start: 2022-02-01 | End: 2022-02-01

## 2022-02-01 RX ORDER — ALBUTEROL SULFATE 2.5 MG/.5ML
2.5 SOLUTION RESPIRATORY (INHALATION) EVERY 4 HOURS PRN
Qty: 25 EACH | Refills: 0 | Status: SHIPPED | OUTPATIENT
Start: 2022-02-01 | End: 2023-02-01

## 2022-02-01 RX ADMIN — IPRATROPIUM BROMIDE AND ALBUTEROL SULFATE 3 ML: 2.5; .5 SOLUTION RESPIRATORY (INHALATION) at 01:02

## 2022-02-01 RX ADMIN — PREDNISONE 60 MG: 20 TABLET ORAL at 01:02

## 2022-02-01 NOTE — ED PROVIDER NOTES
Encounter Date: 2022    SCRIBE #1 NOTE: I, Bill Patten, am scribing for, and in the presence of,  Yolanda Bejarano PA-C. I have scribed the following portions of the note - Other sections scribed: HPI, ROS.       History     Chief Complaint   Patient presents with    Generalized Body Aches     Pt chief compliant is generalized body aches, pt states body really hurts when she coughs.      41 y.o. female with a PMHx of HTN presents to the ED for dry cough. Patient reports that this onset yesterday. She also endorses sore throat, rhinorrhea (clear), and back pain with coughing only. Pt notes 1 sick contact (cousin with cough). Patient has tried using her home nebulizer machine, Mucinex and Theraflu. She endorses last COVID-19 vaccine was 2022. Denies headaches, fever, generalized myalgias, or nasal congestion. No other exacerbating or alleviating factors. No other associated symptoms.       The history is provided by the patient.     Review of patient's allergies indicates:   Allergen Reactions    Tangerine Anaphylaxis    Sweet orange(citrus sinensis) Hives    Oranges [orange]      Past Medical History:   Diagnosis Date    Bipolar 1 disorder     Cocaine abuse     DM mellitus, gestational     ETOH abuse     Hypertension     Methamphetamine abuse     Opiate overdose     Seizures     Trichomonas infection      Past Surgical History:   Procedure Laterality Date     SECTION, LOW TRANSVERSE      x 3    ECTOPIC PREGNANCY SURGERY      KNEE SURGERY  2010    left knee    SALPINGECTOMY      THYROID SURGERY      TUBAL LIGATION       Family History   Problem Relation Age of Onset    Heart disease Mother     Cancer Mother         Breast Cancer    Cancer Father         Throat Cancer    Cancer Maternal Grandmother     Cancer Maternal Grandfather      Social History     Tobacco Use    Smoking status: Current Some Day Smoker     Types: Cigars, Cigarettes    Smokeless tobacco: Never Used     "Tobacco comment: Patient states she smokes 1 cigar per week.   Substance Use Topics    Alcohol use: Yes     Comment: binge drinker    Drug use: Yes     Types: Marijuana, "Crack" cocaine, MDMA (Ecstacy), Amphetamines, Cocaine, Methamphetamines, Heroin     Review of Systems   Constitutional: Negative for chills and fever.   HENT: Positive for rhinorrhea and sore throat. Negative for congestion.    Eyes: Negative for visual disturbance.   Respiratory: Positive for cough. Negative for shortness of breath.    Cardiovascular: Negative for chest pain.   Gastrointestinal: Negative for abdominal pain, nausea and vomiting.   Genitourinary: Negative for dysuria and vaginal discharge.   Musculoskeletal: Positive for back pain (with cough only). Negative for myalgias.   Skin: Negative for rash.   Neurological: Negative for headaches.   Psychiatric/Behavioral: Negative for decreased concentration.   All other systems reviewed and are negative.      Physical Exam     Initial Vitals [02/01/22 1146]   BP Pulse Resp Temp SpO2   (!) 161/100 102 18 99.9 °F (37.7 °C) 98 %      MAP       --         Physical Exam    Nursing note and vitals reviewed.  Constitutional: She appears well-developed and well-nourished. She is not diaphoretic. No distress.   HENT:   Head: Normocephalic and atraumatic.   Right Ear: External ear normal.   Left Ear: External ear normal.   Nose: Nose normal.   Bilateral nasal congestion and posterior pharyngeal erythema noted   Eyes: EOM are normal. Pupils are equal, round, and reactive to light.   Neck: Neck supple.   Normal range of motion.  Cardiovascular: Regular rhythm.   No murmur heard.  Pulmonary/Chest: Breath sounds normal. No respiratory distress. She has no wheezes. She has no rhonchi. She has no rales.   There is decreased breath sounds noted bilaterally.   Abdominal: Abdomen is soft. Bowel sounds are normal. She exhibits no distension. There is no abdominal tenderness. There is no rebound and no " guarding.   Musculoskeletal:         General: No tenderness or edema. Normal range of motion.      Cervical back: Normal range of motion and neck supple.     Neurological: She is alert and oriented to person, place, and time. No cranial nerve deficit.   Skin: Skin is warm. Capillary refill takes less than 2 seconds. No rash noted. No erythema.         ED Course   Procedures  Labs Reviewed   POCT INFLUENZA A/B MOLECULAR - Abnormal; Notable for the following components:       Result Value    POC Molecular Influenza A Ag Positive (*)     All other components within normal limits   SARS-COV-2 (COVID-19) QUALITATIVE PCR   SARS-COV-2 RDRP GENE    Narrative:     This test utilizes isothermal nucleic acid amplification   technology to detect the SARS-CoV-2 RdRp nucleic acid segment.   The analytical sensitivity (limit of detection) is 125 genome   equivalents/mL.   A POSITIVE result implies infection with the SARS-CoV-2 virus;   the patient is presumed to be contagious.     A NEGATIVE result means that SARS-CoV-2 nucleic acids are not   present above the limit of detection. A NEGATIVE result should be   treated as presumptive. It does not rule out the possibility of   COVID-19 and should not be the sole basis for treatment decisions.   If COVID-19 is strongly suspected based on clinical and exposure   history, re-testing using an alternate molecular assay should be   considered.   This test is only for use under the Food and Drug   Administration s Emergency Use Authorization (EUA).   Commercial kits are provided by [employer].   Performance characteristics of the EUA have been independently   verified by [facility] Department of   Pathology and Laboratory Medicine.   _________________________________________________________________   The authorized Fact Sheet for Healthcare Providers and the authorized Fact   Sheet for Patients of the ID NOW COVID-19 are available on the FDA   website:      https://www.fda.gov/media/698598/download  https://www.fda.gov/media/537371/download              Imaging Results    None          Medications   albuterol-ipratropium 2.5 mg-0.5 mg/3 mL nebulizer solution 3 mL (3 mLs Nebulization Given 2/1/22 1323)   predniSONE tablet 60 mg (60 mg Oral Given 2/1/22 1303)           APC / Resident Notes:   This is an urgent evaluation of a 41-year-old female who presents to the emergency department with flu like symptoms.    The patient is currently afebrile at triage.  However, further during the encounter she became febrile at 100.1° F. She was mildly tachycardic at 112 beats per minute.  Her blood pressure was also elevated.  Physical exam was consistent with decreased breath sounds and posterior pharyngeal erythema.  There was no evidence of meningitis.    Rapid influenza was performed which was positive for influenza A. Rapid COVID was also negative.  A breathing treatment and prednisone was administered in the emergency department and the patient reported feeling better.  The cause of her fever is likely due to the flu.  I will prescribe tamiflu and refill her albuterol nebulizer treatments.  She will need to follow up with her primary doctor.  She verbalized understanding and agreement.  She is currently safe and stable for discharge at this time.     Scribe Attestation:   Scribe #1: I performed the above scribed service and the documentation accurately describes the services I performed. I attest to the accuracy of the note.                 Clinical Impression:   Final diagnoses:  [J10.1] Influenza A (Primary)  [R52] Body aches  [R05.9] Cough  [R50.9] Acute febrile illness          ED Disposition Condition    Discharge Stable        ED Prescriptions     Medication Sig Dispense Start Date End Date Auth. Provider    oseltamivir (TAMIFLU) 75 MG capsule Take 1 capsule (75 mg total) by mouth 2 (two) times daily. for 5 days 10 capsule 2/1/2022 2/6/2022 Yolanda Bejarano PA-C     albuterol sulfate 2.5 mg/0.5 mL Nebu Take 2.5 mg by nebulization every 4 (four) hours as needed (cough and wheezing). Rescue 25 each 2/1/2022 2/1/2023 Yolanda Bejarano PA-C        Follow-up Information     Follow up With Specialties Details Why Contact Info    Sofi Kennedy MD Family Medicine   [address] [place] [id number]  [phone]         I, Yolanda Bejarano PA-C, personally performed the services described in this documentation. All medical record entries made by the scribe were at my direction and in my presence. I have reviewed the chart and agree that the record reflects my personal performance and is accurate and complete.       Yolanda Bejarano PA-C  02/01/22 [id number]

## 2022-02-01 NOTE — Clinical Note
"Nicole ROD"Caitlyn Collins was seen and treated in our emergency department on 2/1/2022.  She may return to work on 02/08/2022.       If you have any questions or concerns, please don't hesitate to call.      Yolanda Bejarano PA-C"

## 2022-02-01 NOTE — DISCHARGE INSTRUCTIONS
You have the flu.  Please take medication as prescribed.  You may take over-the-counter Tylenol or Motrin for body aches and fever.  Rest.  Stay away from others while you are sick.

## 2022-02-01 NOTE — ED TRIAGE NOTES
Pt. Reports she has a dry cough and bilat rib pain with coughing. Pt. Denies any fevers at home. Pt. reports her cousin was sick on last week and that is when her symptoms started.    105

## 2022-02-02 ENCOUNTER — PES CALL (OUTPATIENT)
Dept: ADMINISTRATIVE | Facility: CLINIC | Age: 42
End: 2022-02-02
Payer: MEDICAID

## 2022-03-28 ENCOUNTER — HOSPITAL ENCOUNTER (EMERGENCY)
Facility: HOSPITAL | Age: 42
Discharge: HOME | End: 2022-03-28
Attending: EMERGENCY MEDICINE
Payer: MEDICAID

## 2022-03-28 VITALS
OXYGEN SATURATION: 100 % | DIASTOLIC BLOOD PRESSURE: 93 MMHG | RESPIRATION RATE: 18 BRPM | BODY MASS INDEX: 27 KG/M2 | HEART RATE: 78 BPM | WEIGHT: 168 LBS | HEIGHT: 66 IN | TEMPERATURE: 98 F | SYSTOLIC BLOOD PRESSURE: 138 MMHG

## 2022-03-28 DIAGNOSIS — L50.9 URTICARIA: Primary | ICD-10-CM

## 2022-03-28 PROCEDURE — 99284 EMERGENCY DEPT VISIT MOD MDM: CPT

## 2022-03-28 PROCEDURE — 25000003 PHARM REV CODE 250: Performed by: NURSE PRACTITIONER

## 2022-03-28 PROCEDURE — 63600175 PHARM REV CODE 636 W HCPCS: Performed by: NURSE PRACTITIONER

## 2022-03-28 RX ORDER — PREDNISONE 20 MG/1
60 TABLET ORAL
Status: COMPLETED | OUTPATIENT
Start: 2022-03-28 | End: 2022-03-28

## 2022-03-28 RX ORDER — FAMOTIDINE 20 MG/1
20 TABLET, FILM COATED ORAL
Status: COMPLETED | OUTPATIENT
Start: 2022-03-28 | End: 2022-03-28

## 2022-03-28 RX ORDER — PREDNISONE 20 MG/1
40 TABLET ORAL DAILY
Qty: 10 TABLET | Refills: 0 | Status: SHIPPED | OUTPATIENT
Start: 2022-03-28 | End: 2022-04-02

## 2022-03-28 RX ORDER — CETIRIZINE HYDROCHLORIDE 10 MG/1
10 TABLET ORAL DAILY
Qty: 30 TABLET | Refills: 0 | COMMUNITY
Start: 2022-03-28 | End: 2023-03-28

## 2022-03-28 RX ORDER — FAMOTIDINE 20 MG/1
20 TABLET, FILM COATED ORAL 2 TIMES DAILY
Qty: 20 TABLET | Refills: 0 | COMMUNITY
Start: 2022-03-28 | End: 2023-03-28

## 2022-03-28 RX ORDER — CETIRIZINE HYDROCHLORIDE 10 MG/1
10 TABLET ORAL
Status: COMPLETED | OUTPATIENT
Start: 2022-03-28 | End: 2022-03-28

## 2022-03-28 RX ADMIN — CETIRIZINE HYDROCHLORIDE 10 MG: 10 TABLET, FILM COATED ORAL at 11:03

## 2022-03-28 RX ADMIN — PREDNISONE 60 MG: 20 TABLET ORAL at 11:03

## 2022-03-28 RX ADMIN — FAMOTIDINE 20 MG: 20 TABLET ORAL at 11:03

## 2022-03-28 NOTE — ED PROVIDER NOTES
"Encounter Date: 3/28/2022    SCRIBE #1 NOTE: I, Pio Boyle, am scribing for, and in the presence of,  ABEBA James. I have scribed the following portions of the note - Other sections scribed: HPI, ROS.       History     Chief Complaint   Patient presents with    Urticaria     Patient reports that she "broke out" all over her body 3 days ago. Hives noted to patient's face and chest. She reports recently changing her laundry detergent and body wash. Patient also reports a right frontal headache x 3 days. Patient denies CP or sob.      41 y.o. female, with a pertinent past medical history of DM mellitus, ETOH abuse, hypertension, and drug abuse presents to the ED with generalized urticaria that began 3 days ago. Pt states that she recently switched laundry detergent and body wash and believes this was the start of the reaction. Pt states that she waited this long because she has been working. Pt states that the rashes burn and itch. Pt has taken benadryl with no relief. No other exacerbating or alleviating factors. Patient denies shortness of breath, or other associated symptoms.       The history is provided by the patient. No  was used.     Review of patient's allergies indicates:   Allergen Reactions    Tangerine Anaphylaxis    Sweet orange(citrus sinensis) Hives    Oranges [orange]      Past Medical History:   Diagnosis Date    Bipolar 1 disorder     Cocaine abuse     DM mellitus, gestational     ETOH abuse     Hypertension     Methamphetamine abuse     Opiate overdose     Seizures     Trichomonas infection      Past Surgical History:   Procedure Laterality Date     SECTION, LOW TRANSVERSE      x 3    ECTOPIC PREGNANCY SURGERY      KNEE SURGERY  2010    left knee    SALPINGECTOMY      THYROID SURGERY      TUBAL LIGATION       Family History   Problem Relation Age of Onset    Heart disease Mother     Cancer Mother         Breast Cancer    Cancer " "Father         Throat Cancer    Cancer Maternal Grandmother     Cancer Maternal Grandfather      Social History     Tobacco Use    Smoking status: Current Some Day Smoker     Types: Cigars, Cigarettes    Smokeless tobacco: Never Used    Tobacco comment: Patient states she smokes 1 cigar per week.   Substance Use Topics    Alcohol use: Yes     Comment: binge drinker    Drug use: Yes     Types: Marijuana, "Crack" cocaine, MDMA (Ecstacy), Amphetamines, Cocaine, Methamphetamines, Heroin     Review of Systems   Constitutional: Negative for fever.   HENT: Negative for sore throat.    Respiratory: Negative for shortness of breath.    Cardiovascular: Negative for chest pain.   Gastrointestinal: Negative for nausea.   Genitourinary: Negative for dysuria.   Musculoskeletal: Negative for back pain.   Skin: Positive for rash.        (+) itching  (+) burning sensation   Neurological: Negative for weakness.   Hematological: Does not bruise/bleed easily.   All other systems reviewed and are negative.    Physical Exam     Initial Vitals [03/28/22 1049]   BP Pulse Resp Temp SpO2   (!) 134/91 89 16 97.4 °F (36.3 °C) 100 %      MAP       --         Physical Exam    Nursing note and vitals reviewed.  Constitutional: She appears well-developed and well-nourished.   HENT:   Head: Normocephalic and atraumatic.   Eyes: Conjunctivae and EOM are normal. Pupils are equal, round, and reactive to light.   Neck:   Normal range of motion.  Cardiovascular: Normal rate, regular rhythm, normal heart sounds and intact distal pulses. Exam reveals no gallop and no friction rub.    No murmur heard.  Pulmonary/Chest: Breath sounds normal. No respiratory distress. She has no wheezes. She has no rhonchi. She has no rales. She exhibits no tenderness.   Abdominal: Abdomen is soft. Bowel sounds are normal. She exhibits no distension and no mass. There is no abdominal tenderness. There is no rebound.   Musculoskeletal:         General: No tenderness or " edema. Normal range of motion.      Cervical back: Normal range of motion.     Neurological: She is alert and oriented to person, place, and time. GCS score is 15. GCS eye subscore is 4. GCS verbal subscore is 5. GCS motor subscore is 6.   Skin: Capillary refill takes less than 2 seconds. Rash noted. Rash is urticarial.       ED Course   Procedures  Labs Reviewed - No data to display       Imaging Results    None          Medications   predniSONE tablet 60 mg (60 mg Oral Given 3/28/22 1150)   cetirizine tablet 10 mg (10 mg Oral Given 3/28/22 1150)   famotidine tablet 20 mg (20 mg Oral Given 3/28/22 1150)     Medical Decision Making:   History:   Old Medical Records: I decided to obtain old medical records.  Initial Assessment:   41 y.o. female, with a pertinent past medical history of DM mellitus, ETOH abuse, hypertension, and drug abuse presents to the ED with generalized urticaria that began 3 days ago.  Differential Diagnosis:   Differential Diagnosis includes, but is not limited to:  Necrotizing fasciitis, erythema multiforme, Romero-Bal syndrome, toxic epidermal necrolysis, DIC, cellulitis, Staph scalded skin syndrome, toxic shock syndrome, secondary syphilis, abscess, osteomyelitis, septic joint, MRSA, DVT, superficial thrombophlebitis, varicose vein, drug eruption, allergic reaction/urticatia, irritant/contact dermatitis, viral exanthem, local trauma/contusion, abrasion.    ED Management:  Pt examined and noted to have whole body allergic dermatitis. No signs of respiratory distress. She was given prednisone, pepcid and zyrtec while in the ED. She felt better about 30 minutes later and was ready to leave. She has been discharged with steroids and advised to take OTC pepcid and zyrtec. She has also been referred to an allergist for further testing. Patient given strict return precautions and voiced understanding of all discharge instructions. Pt was stable at discharge.               Scribe Attestation:    Scribe #1: I performed the above scribed service and the documentation accurately describes the services I performed. I attest to the accuracy of the note.        ED Course as of 03/28/22 1424   Mon Mar 28, 2022   1145 BP(!): 134/91 [AT]   1145 Temp: 97.4 °F (36.3 °C) [AT]   1145 Temp src: Oral [AT]   1145 Pulse: 89 [AT]   1145 Resp: 16 [AT]   1145 SpO2: 100 % [AT]      ED Course User Index  [AT] ABEBA James             Clinical Impression:   Final diagnoses:  [L50.9] Urticaria (Primary)          ED Disposition Condition    Discharge Stable        ED Prescriptions     Medication Sig Dispense Start Date End Date Auth. Provider    predniSONE (DELTASONE) 20 MG tablet Take 2 tablets (40 mg total) by mouth once daily. for 5 days 10 tablet 3/28/2022 4/2/2022 ABEBA James    cetirizine (ZYRTEC) 10 MG tablet Take 1 tablet (10 mg total) by mouth once daily. 30 tablet 3/28/2022 3/28/2023 ABEBA James    famotidine (PEPCID) 20 MG tablet Take 1 tablet (20 mg total) by mouth 2 (two) times daily. 20 tablet 3/28/2022 3/28/2023 ABEBA James        Follow-up Information     Follow up With Specialties Details Why Contact Info    Sofi Kennedy MD Family Medicine Schedule an appointment as soon as possible for a visit  As needed [address] [facility] [id number] [phone]          I, ABEBA James, personally performed the services described in this documentation. All medical record entries made by the scribe were at my direction and in my presence. I have reviewed the chart and agree that the record reflects my personal performance and is accurate and complete.       ABEBA James  03/28/22 142

## 2022-05-07 ENCOUNTER — HOSPITAL ENCOUNTER (OUTPATIENT)
Facility: HOSPITAL | Age: 42
Discharge: HOME | End: 2022-05-09
Attending: EMERGENCY MEDICINE | Admitting: HOSPITALIST
Payer: MEDICAID

## 2022-05-07 ENCOUNTER — HOSPITAL ENCOUNTER (EMERGENCY)
Facility: HOSPITAL | Age: 42
Discharge: LEFT BEFORE DISCHARGE | End: 2022-05-07
Attending: EMERGENCY MEDICINE
Payer: MEDICAID

## 2022-05-07 VITALS
TEMPERATURE: 98 F | DIASTOLIC BLOOD PRESSURE: 109 MMHG | WEIGHT: 190 LBS | HEIGHT: 66 IN | HEART RATE: 96 BPM | OXYGEN SATURATION: 100 % | BODY MASS INDEX: 30.53 KG/M2 | SYSTOLIC BLOOD PRESSURE: 175 MMHG | RESPIRATION RATE: 18 BRPM

## 2022-05-07 DIAGNOSIS — L08.9 SOFT TISSUE INFECTION: ICD-10-CM

## 2022-05-07 DIAGNOSIS — L03.90 CELLULITIS, UNSPECIFIED CELLULITIS SITE: Primary | ICD-10-CM

## 2022-05-07 DIAGNOSIS — A41.9 SEPSIS: ICD-10-CM

## 2022-05-07 DIAGNOSIS — R07.9 CHEST PAIN: ICD-10-CM

## 2022-05-07 PROBLEM — I10 HTN (HYPERTENSION): Status: ACTIVE | Noted: 2022-05-07

## 2022-05-07 PROBLEM — F31.9 BIPOLAR 1 DISORDER: Status: ACTIVE | Noted: 2022-05-07

## 2022-05-07 LAB
ALBUMIN SERPL BCP-MCNC: 3.5 G/DL (ref 3.5–5.2)
ALP SERPL-CCNC: 52 U/L (ref 55–135)
ALT SERPL W/O P-5'-P-CCNC: 26 U/L (ref 10–44)
ANION GAP SERPL CALC-SCNC: 9 MMOL/L (ref 8–16)
AST SERPL-CCNC: 26 U/L (ref 10–40)
B-HCG UR QL: NEGATIVE
BASOPHILS # BLD AUTO: 0.01 K/UL (ref 0–0.2)
BASOPHILS # BLD AUTO: 0.02 K/UL (ref 0–0.2)
BASOPHILS NFR BLD: 0.1 % (ref 0–1.9)
BASOPHILS NFR BLD: 0.3 % (ref 0–1.9)
BILIRUB SERPL-MCNC: 0.2 MG/DL (ref 0.1–1)
BILIRUB UR QL STRIP: NEGATIVE
BUN SERPL-MCNC: 13 MG/DL (ref 6–20)
CALCIUM SERPL-MCNC: 9.3 MG/DL (ref 8.7–10.5)
CHLORIDE SERPL-SCNC: 107 MMOL/L (ref 95–110)
CLARITY UR: CLEAR
CO2 SERPL-SCNC: 24 MMOL/L (ref 23–29)
COLOR UR: YELLOW
CREAT SERPL-MCNC: 1 MG/DL (ref 0.5–1.4)
CRP SERPL-MCNC: 10.3 MG/L (ref 0–8.2)
CTP QC/QA: YES
DIFFERENTIAL METHOD: ABNORMAL
DIFFERENTIAL METHOD: ABNORMAL
EOSINOPHIL # BLD AUTO: 0 K/UL (ref 0–0.5)
EOSINOPHIL # BLD AUTO: 0.2 K/UL (ref 0–0.5)
EOSINOPHIL NFR BLD: 0.3 % (ref 0–8)
EOSINOPHIL NFR BLD: 3 % (ref 0–8)
ERYTHROCYTE [DISTWIDTH] IN BLOOD BY AUTOMATED COUNT: 12.9 % (ref 11.5–14.5)
ERYTHROCYTE [DISTWIDTH] IN BLOOD BY AUTOMATED COUNT: 13.2 % (ref 11.5–14.5)
ERYTHROCYTE [SEDIMENTATION RATE] IN BLOOD BY WESTERGREN METHOD: 12 MM/HR (ref 0–20)
EST. GFR  (AFRICAN AMERICAN): >60 ML/MIN/1.73 M^2
EST. GFR  (NON AFRICAN AMERICAN): >60 ML/MIN/1.73 M^2
GLUCOSE SERPL-MCNC: 101 MG/DL (ref 70–110)
GLUCOSE UR QL STRIP: NEGATIVE
HCT VFR BLD AUTO: 36.7 % (ref 37–48.5)
HCT VFR BLD AUTO: 37.5 % (ref 37–48.5)
HGB BLD-MCNC: 11.8 G/DL (ref 12–16)
HGB BLD-MCNC: 11.8 G/DL (ref 12–16)
HGB UR QL STRIP: NEGATIVE
IMM GRANULOCYTES # BLD AUTO: 0.02 K/UL (ref 0–0.04)
IMM GRANULOCYTES # BLD AUTO: 0.02 K/UL (ref 0–0.04)
IMM GRANULOCYTES NFR BLD AUTO: 0.3 % (ref 0–0.5)
IMM GRANULOCYTES NFR BLD AUTO: 0.3 % (ref 0–0.5)
KETONES UR QL STRIP: NEGATIVE
LACTATE SERPL-SCNC: 2.8 MMOL/L (ref 0.5–2.2)
LEUKOCYTE ESTERASE UR QL STRIP: NEGATIVE
LYMPHOCYTES # BLD AUTO: 0.9 K/UL (ref 1–4.8)
LYMPHOCYTES # BLD AUTO: 1.7 K/UL (ref 1–4.8)
LYMPHOCYTES NFR BLD: 13.3 % (ref 18–48)
LYMPHOCYTES NFR BLD: 28.7 % (ref 18–48)
MCH RBC QN AUTO: 32.3 PG (ref 27–31)
MCH RBC QN AUTO: 32.9 PG (ref 27–31)
MCHC RBC AUTO-ENTMCNC: 31.5 G/DL (ref 32–36)
MCHC RBC AUTO-ENTMCNC: 32.2 G/DL (ref 32–36)
MCV RBC AUTO: 102 FL (ref 82–98)
MCV RBC AUTO: 103 FL (ref 82–98)
MONOCYTES # BLD AUTO: 0.3 K/UL (ref 0.3–1)
MONOCYTES # BLD AUTO: 0.6 K/UL (ref 0.3–1)
MONOCYTES NFR BLD: 4 % (ref 4–15)
MONOCYTES NFR BLD: 9.6 % (ref 4–15)
NEUTROPHILS # BLD AUTO: 3.5 K/UL (ref 1.8–7.7)
NEUTROPHILS # BLD AUTO: 5.5 K/UL (ref 1.8–7.7)
NEUTROPHILS NFR BLD: 58.1 % (ref 38–73)
NEUTROPHILS NFR BLD: 82 % (ref 38–73)
NITRITE UR QL STRIP: NEGATIVE
NRBC BLD-RTO: 0 /100 WBC
NRBC BLD-RTO: 0 /100 WBC
PH UR STRIP: 7 [PH] (ref 5–8)
PLATELET # BLD AUTO: 291 K/UL (ref 150–450)
PLATELET # BLD AUTO: 309 K/UL (ref 150–450)
PMV BLD AUTO: 10 FL (ref 9.2–12.9)
PMV BLD AUTO: 9.5 FL (ref 9.2–12.9)
POCT GLUCOSE: 72 MG/DL (ref 70–110)
POTASSIUM SERPL-SCNC: 3.8 MMOL/L (ref 3.5–5.1)
PROT SERPL-MCNC: 7 G/DL (ref 6–8.4)
PROT UR QL STRIP: NEGATIVE
RBC # BLD AUTO: 3.59 M/UL (ref 4–5.4)
RBC # BLD AUTO: 3.65 M/UL (ref 4–5.4)
SODIUM SERPL-SCNC: 140 MMOL/L (ref 136–145)
SP GR UR STRIP: 1.02 (ref 1–1.03)
URN SPEC COLLECT METH UR: ABNORMAL
UROBILINOGEN UR STRIP-ACNC: ABNORMAL EU/DL
WBC # BLD AUTO: 5.96 K/UL (ref 3.9–12.7)
WBC # BLD AUTO: 6.69 K/UL (ref 3.9–12.7)

## 2022-05-07 PROCEDURE — 96375 TX/PRO/DX INJ NEW DRUG ADDON: CPT

## 2022-05-07 PROCEDURE — G0378 HOSPITAL OBSERVATION PER HR: HCPCS

## 2022-05-07 PROCEDURE — 81003 URINALYSIS AUTO W/O SCOPE: CPT | Performed by: EMERGENCY MEDICINE

## 2022-05-07 PROCEDURE — 93005 ELECTROCARDIOGRAM TRACING: CPT

## 2022-05-07 PROCEDURE — 96361 HYDRATE IV INFUSION ADD-ON: CPT

## 2022-05-07 PROCEDURE — 99291 CRITICAL CARE FIRST HOUR: CPT | Mod: 25

## 2022-05-07 PROCEDURE — 36415 COLL VENOUS BLD VENIPUNCTURE: CPT | Performed by: STUDENT IN AN ORGANIZED HEALTH CARE EDUCATION/TRAINING PROGRAM

## 2022-05-07 PROCEDURE — 87040 BLOOD CULTURE FOR BACTERIA: CPT | Performed by: EMERGENCY MEDICINE

## 2022-05-07 PROCEDURE — 80053 COMPREHEN METABOLIC PANEL: CPT | Performed by: EMERGENCY MEDICINE

## 2022-05-07 PROCEDURE — 99220 PR INITIAL OBSERVATION CARE,LEVL III: CPT | Mod: ,,, | Performed by: HOSPITALIST

## 2022-05-07 PROCEDURE — 63600175 PHARM REV CODE 636 W HCPCS: Performed by: PHYSICIAN ASSISTANT

## 2022-05-07 PROCEDURE — 96366 THER/PROPH/DIAG IV INF ADDON: CPT

## 2022-05-07 PROCEDURE — 85025 COMPLETE CBC W/AUTO DIFF WBC: CPT | Performed by: EMERGENCY MEDICINE

## 2022-05-07 PROCEDURE — 99284 EMERGENCY DEPT VISIT MOD MDM: CPT | Mod: 25

## 2022-05-07 PROCEDURE — 85652 RBC SED RATE AUTOMATED: CPT | Performed by: EMERGENCY MEDICINE

## 2022-05-07 PROCEDURE — 86140 C-REACTIVE PROTEIN: CPT | Performed by: EMERGENCY MEDICINE

## 2022-05-07 PROCEDURE — 93010 ELECTROCARDIOGRAM REPORT: CPT | Mod: ,,, | Performed by: INTERNAL MEDICINE

## 2022-05-07 PROCEDURE — 25000003 PHARM REV CODE 250: Performed by: EMERGENCY MEDICINE

## 2022-05-07 PROCEDURE — 96365 THER/PROPH/DIAG IV INF INIT: CPT | Mod: 59

## 2022-05-07 PROCEDURE — 25000003 PHARM REV CODE 250: Performed by: PHYSICIAN ASSISTANT

## 2022-05-07 PROCEDURE — 25500020 PHARM REV CODE 255: Performed by: EMERGENCY MEDICINE

## 2022-05-07 PROCEDURE — 63600175 PHARM REV CODE 636 W HCPCS: Performed by: EMERGENCY MEDICINE

## 2022-05-07 PROCEDURE — 63600175 PHARM REV CODE 636 W HCPCS: Performed by: STUDENT IN AN ORGANIZED HEALTH CARE EDUCATION/TRAINING PROGRAM

## 2022-05-07 PROCEDURE — 99220 PR INITIAL OBSERVATION CARE,LEVL III: ICD-10-PCS | Mod: ,,, | Performed by: HOSPITALIST

## 2022-05-07 PROCEDURE — 96365 THER/PROPH/DIAG IV INF INIT: CPT

## 2022-05-07 PROCEDURE — 25000003 PHARM REV CODE 250

## 2022-05-07 PROCEDURE — 81025 URINE PREGNANCY TEST: CPT | Performed by: EMERGENCY MEDICINE

## 2022-05-07 PROCEDURE — 96372 THER/PROPH/DIAG INJ SC/IM: CPT | Mod: 59

## 2022-05-07 PROCEDURE — 86803 HEPATITIS C AB TEST: CPT | Performed by: EMERGENCY MEDICINE

## 2022-05-07 PROCEDURE — 85025 COMPLETE CBC W/AUTO DIFF WBC: CPT | Mod: 91 | Performed by: EMERGENCY MEDICINE

## 2022-05-07 PROCEDURE — 63600175 PHARM REV CODE 636 W HCPCS

## 2022-05-07 PROCEDURE — 93010 EKG 12-LEAD: ICD-10-PCS | Mod: ,,, | Performed by: INTERNAL MEDICINE

## 2022-05-07 PROCEDURE — 83605 ASSAY OF LACTIC ACID: CPT | Mod: 91 | Performed by: STUDENT IN AN ORGANIZED HEALTH CARE EDUCATION/TRAINING PROGRAM

## 2022-05-07 PROCEDURE — 25000003 PHARM REV CODE 250: Performed by: STUDENT IN AN ORGANIZED HEALTH CARE EDUCATION/TRAINING PROGRAM

## 2022-05-07 PROCEDURE — 96367 TX/PROPH/DG ADDL SEQ IV INF: CPT

## 2022-05-07 PROCEDURE — 83605 ASSAY OF LACTIC ACID: CPT | Performed by: EMERGENCY MEDICINE

## 2022-05-07 PROCEDURE — 87389 HIV-1 AG W/HIV-1&-2 AB AG IA: CPT | Performed by: EMERGENCY MEDICINE

## 2022-05-07 PROCEDURE — 96367 TX/PROPH/DG ADDL SEQ IV INF: CPT | Mod: 59

## 2022-05-07 RX ORDER — LEVOFLOXACIN 750 MG/1
750 TABLET ORAL DAILY
Qty: 7 TABLET | Refills: 0 | Status: ON HOLD | OUTPATIENT
Start: 2022-05-07 | End: 2022-05-09 | Stop reason: HOSPADM

## 2022-05-07 RX ORDER — FAMOTIDINE 20 MG/1
20 TABLET, FILM COATED ORAL 2 TIMES DAILY
Status: DISCONTINUED | OUTPATIENT
Start: 2022-05-07 | End: 2022-05-09 | Stop reason: HOSPADM

## 2022-05-07 RX ORDER — IBUPROFEN 200 MG
16 TABLET ORAL
Status: DISCONTINUED | OUTPATIENT
Start: 2022-05-07 | End: 2022-05-09 | Stop reason: HOSPADM

## 2022-05-07 RX ORDER — HYDROCHLOROTHIAZIDE 25 MG/1
25 TABLET ORAL DAILY
Qty: 30 TABLET | Refills: 0 | Status: SHIPPED | OUTPATIENT
Start: 2022-05-07 | End: 2023-05-07

## 2022-05-07 RX ORDER — AMLODIPINE BESYLATE 10 MG/1
10 TABLET ORAL DAILY
Status: DISCONTINUED | OUTPATIENT
Start: 2022-05-08 | End: 2022-05-08

## 2022-05-07 RX ORDER — PHENYTOIN SODIUM 100 MG/1
100 CAPSULE, EXTENDED RELEASE ORAL 3 TIMES DAILY
Status: DISCONTINUED | OUTPATIENT
Start: 2022-05-07 | End: 2022-05-09 | Stop reason: HOSPADM

## 2022-05-07 RX ORDER — DOXYCYCLINE HYCLATE 100 MG
100 TABLET ORAL EVERY 12 HOURS
Status: DISCONTINUED | OUTPATIENT
Start: 2022-05-07 | End: 2022-05-09 | Stop reason: HOSPADM

## 2022-05-07 RX ORDER — SODIUM CHLORIDE 0.9 % (FLUSH) 0.9 %
10 SYRINGE (ML) INJECTION EVERY 12 HOURS PRN
Status: DISCONTINUED | OUTPATIENT
Start: 2022-05-07 | End: 2022-05-09 | Stop reason: HOSPADM

## 2022-05-07 RX ORDER — DIPHENHYDRAMINE HCL 25 MG
25 CAPSULE ORAL EVERY 6 HOURS PRN
Status: DISCONTINUED | OUTPATIENT
Start: 2022-05-07 | End: 2022-05-09 | Stop reason: HOSPADM

## 2022-05-07 RX ORDER — CLINDAMYCIN HYDROCHLORIDE 150 MG/1
450 CAPSULE ORAL EVERY 8 HOURS
Qty: 63 CAPSULE | Refills: 0 | Status: ON HOLD | OUTPATIENT
Start: 2022-05-07 | End: 2022-05-09 | Stop reason: HOSPADM

## 2022-05-07 RX ORDER — QUETIAPINE FUMARATE 300 MG/1
300 TABLET, FILM COATED ORAL NIGHTLY
Status: DISCONTINUED | OUTPATIENT
Start: 2022-05-07 | End: 2022-05-09 | Stop reason: HOSPADM

## 2022-05-07 RX ORDER — CLINDAMYCIN PHOSPHATE 900 MG/50ML
900 INJECTION, SOLUTION INTRAVENOUS
Status: COMPLETED | OUTPATIENT
Start: 2022-05-07 | End: 2022-05-07

## 2022-05-07 RX ORDER — DOXYCYCLINE HYCLATE 100 MG
100 TABLET ORAL
Status: COMPLETED | OUTPATIENT
Start: 2022-05-07 | End: 2022-05-07

## 2022-05-07 RX ORDER — IBUPROFEN 200 MG
24 TABLET ORAL
Status: DISCONTINUED | OUTPATIENT
Start: 2022-05-07 | End: 2022-05-09 | Stop reason: HOSPADM

## 2022-05-07 RX ORDER — AMLODIPINE BESYLATE 10 MG/1
10 TABLET ORAL DAILY
Qty: 30 TABLET | Refills: 0 | Status: SHIPPED | OUTPATIENT
Start: 2022-05-07 | End: 2023-02-20 | Stop reason: SDUPTHER

## 2022-05-07 RX ORDER — ALPRAZOLAM 1 MG/1
1 TABLET ORAL 2 TIMES DAILY PRN
Status: DISCONTINUED | OUTPATIENT
Start: 2022-05-07 | End: 2022-05-09 | Stop reason: HOSPADM

## 2022-05-07 RX ORDER — ALBUTEROL SULFATE 2.5 MG/.5ML
2.5 SOLUTION RESPIRATORY (INHALATION) EVERY 4 HOURS PRN
Status: DISCONTINUED | OUTPATIENT
Start: 2022-05-07 | End: 2022-05-09 | Stop reason: HOSPADM

## 2022-05-07 RX ORDER — MORPHINE SULFATE 4 MG/ML
4 INJECTION, SOLUTION INTRAMUSCULAR; INTRAVENOUS
Status: COMPLETED | OUTPATIENT
Start: 2022-05-07 | End: 2022-05-07

## 2022-05-07 RX ORDER — ESCITALOPRAM OXALATE 20 MG/1
20 TABLET ORAL EVERY MORNING
Status: DISCONTINUED | OUTPATIENT
Start: 2022-05-08 | End: 2022-05-09 | Stop reason: HOSPADM

## 2022-05-07 RX ORDER — CIPROFLOXACIN 500 MG/1
500 TABLET ORAL
Status: DISCONTINUED | OUTPATIENT
Start: 2022-05-07 | End: 2022-05-07

## 2022-05-07 RX ORDER — DIPHENHYDRAMINE HYDROCHLORIDE 50 MG/ML
50 INJECTION INTRAMUSCULAR; INTRAVENOUS
Status: COMPLETED | OUTPATIENT
Start: 2022-05-07 | End: 2022-05-07

## 2022-05-07 RX ORDER — ENOXAPARIN SODIUM 100 MG/ML
40 INJECTION SUBCUTANEOUS EVERY 24 HOURS
Status: DISCONTINUED | OUTPATIENT
Start: 2022-05-07 | End: 2022-05-09 | Stop reason: HOSPADM

## 2022-05-07 RX ORDER — CLINDAMYCIN PHOSPHATE 900 MG/50ML
900 INJECTION, SOLUTION INTRAVENOUS
Status: DISCONTINUED | OUTPATIENT
Start: 2022-05-07 | End: 2022-05-07

## 2022-05-07 RX ORDER — CIPROFLOXACIN 2 MG/ML
400 INJECTION, SOLUTION INTRAVENOUS
Status: COMPLETED | OUTPATIENT
Start: 2022-05-07 | End: 2022-05-07

## 2022-05-07 RX ORDER — HYDROCHLOROTHIAZIDE 25 MG/1
25 TABLET ORAL DAILY
Status: DISCONTINUED | OUTPATIENT
Start: 2022-05-08 | End: 2022-05-08

## 2022-05-07 RX ORDER — ONDANSETRON 2 MG/ML
4 INJECTION INTRAMUSCULAR; INTRAVENOUS EVERY 8 HOURS PRN
Status: DISCONTINUED | OUTPATIENT
Start: 2022-05-07 | End: 2022-05-09 | Stop reason: HOSPADM

## 2022-05-07 RX ORDER — METRONIDAZOLE 500 MG/1
500 TABLET ORAL EVERY 12 HOURS
Qty: 14 TABLET | Refills: 0 | Status: ON HOLD | OUTPATIENT
Start: 2022-05-07 | End: 2022-05-09 | Stop reason: HOSPADM

## 2022-05-07 RX ORDER — GLUCAGON 1 MG
1 KIT INJECTION
Status: DISCONTINUED | OUTPATIENT
Start: 2022-05-07 | End: 2022-05-09 | Stop reason: HOSPADM

## 2022-05-07 RX ORDER — HYDRALAZINE HYDROCHLORIDE 20 MG/ML
20 INJECTION INTRAMUSCULAR; INTRAVENOUS
Status: COMPLETED | OUTPATIENT
Start: 2022-05-07 | End: 2022-05-07

## 2022-05-07 RX ADMIN — QUETIAPINE FUMARATE 300 MG: 300 TABLET ORAL at 10:05

## 2022-05-07 RX ADMIN — IOHEXOL 85 ML: 350 INJECTION, SOLUTION INTRAVENOUS at 11:05

## 2022-05-07 RX ADMIN — SODIUM CHLORIDE, SODIUM LACTATE, POTASSIUM CHLORIDE, AND CALCIUM CHLORIDE 1000 ML: .6; .31; .03; .02 INJECTION, SOLUTION INTRAVENOUS at 06:05

## 2022-05-07 RX ADMIN — MORPHINE SULFATE 4 MG: 4 INJECTION INTRAVENOUS at 06:05

## 2022-05-07 RX ADMIN — ENOXAPARIN SODIUM 40 MG: 100 INJECTION SUBCUTANEOUS at 07:05

## 2022-05-07 RX ADMIN — PIPERACILLIN AND TAZOBACTAM 4.5 G: 4; .5 INJECTION, POWDER, LYOPHILIZED, FOR SOLUTION INTRAVENOUS; PARENTERAL at 11:05

## 2022-05-07 RX ADMIN — CLINDAMYCIN IN 5 PERCENT DEXTROSE 900 MG: 18 INJECTION, SOLUTION INTRAVENOUS at 11:05

## 2022-05-07 RX ADMIN — DOXYCYCLINE HYCLATE 100 MG: 100 TABLET, COATED ORAL at 09:05

## 2022-05-07 RX ADMIN — DOXYCYCLINE HYCLATE 100 MG: 100 TABLET, COATED ORAL at 03:05

## 2022-05-07 RX ADMIN — FAMOTIDINE 20 MG: 20 TABLET ORAL at 09:05

## 2022-05-07 RX ADMIN — CIPROFLOXACIN 400 MG: 2 INJECTION, SOLUTION INTRAVENOUS at 12:05

## 2022-05-07 RX ADMIN — VANCOMYCIN HYDROCHLORIDE 1750 MG: 10 INJECTION, POWDER, LYOPHILIZED, FOR SOLUTION INTRAVENOUS at 03:05

## 2022-05-07 RX ADMIN — DIPHENHYDRAMINE HYDROCHLORIDE 50 MG: 50 INJECTION, SOLUTION INTRAMUSCULAR; INTRAVENOUS at 07:05

## 2022-05-07 RX ADMIN — HYDRALAZINE HYDROCHLORIDE 20 MG: 20 INJECTION, SOLUTION INTRAMUSCULAR; INTRAVENOUS at 03:05

## 2022-05-07 RX ADMIN — PHENYTOIN SODIUM 100 MG: 100 CAPSULE ORAL at 09:05

## 2022-05-07 RX ADMIN — CEFTRIAXONE 1 G: 1 INJECTION, SOLUTION INTRAVENOUS at 09:05

## 2022-05-07 RX ADMIN — PIPERACILLIN AND TAZOBACTAM 4.5 G: 4; .5 INJECTION, POWDER, LYOPHILIZED, FOR SOLUTION INTRAVENOUS; PARENTERAL at 02:05

## 2022-05-07 NOTE — ED PROVIDER NOTES
Encounter Date: 2022       History     Chief Complaint   Patient presents with    Leg Pain     Pt has bilateral leg pain with swelling and discoloration. Legs have cellulitis appearance. She has been walking the [place] searching for her children that drowned 2 weeks ago. BP elevated. Reports not being able to sleep or eat either.      41-year-old female to the ER for evaluation of swelling redness and pain to all 4 extremities.  Patient states that she has been walking up and down [place] and the [employer] [place] looking for children that drowned 2 weeks ago.  Over this duration she has had worsening redness and swelling that has developed.  Began in her legs and has since spread.  She denies any fevers or chills.  She appears very anxious.  She reports severe pain.  The patient's skin appears to be affected from the tops of her feet up into the abdomen.         Review of patient's allergies indicates:   Allergen Reactions    Tangerine Anaphylaxis    Sweet orange(citrus sinensis) Hives    Oranges [orange]      Past Medical History:   Diagnosis Date    Bipolar 1 disorder     Cocaine abuse     DM mellitus, gestational     ETOH abuse     Hypertension     Methamphetamine abuse     Opiate overdose     Seizures     Trichomonas infection      Past Surgical History:   Procedure Laterality Date     SECTION, LOW TRANSVERSE      x 3    ECTOPIC PREGNANCY SURGERY      KNEE SURGERY  2010    left knee    SALPINGECTOMY      THYROID SURGERY      TUBAL LIGATION       Family History   Problem Relation Age of Onset    Heart disease Mother     Cancer Mother         Breast Cancer    Cancer Father         Throat Cancer    Cancer Maternal Grandmother     Cancer Maternal Grandfather      Social History     Tobacco Use    Smoking status: Current Some Day Smoker     Types: Cigars, Cigarettes    Smokeless tobacco: Never Used    Tobacco comment: Patient states she smokes 1 cigar per week.   Substance  "Use Topics    Alcohol use: Yes     Comment: binge drinker    Drug use: Yes     Types: Marijuana, "Crack" cocaine, MDMA (Ecstacy), Amphetamines, Cocaine, Methamphetamines, Heroin     Review of Systems   Constitutional: Negative for fever.   HENT: Negative for sore throat.    Respiratory: Negative for shortness of breath.    Cardiovascular: Negative for chest pain.   Gastrointestinal: Negative for nausea.   Genitourinary: Negative for dysuria.   Musculoskeletal: Negative for back pain.   Skin: Positive for color change and wound. Negative for rash.   Neurological: Negative for weakness.   Hematological: Does not bruise/bleed easily.       Physical Exam     Initial Vitals [05/07/22 0127]   BP Pulse Resp Temp SpO2   (!) 180/118 (!) 123 20 98.3 °F (36.8 °C) 100 %      MAP       --         Physical Exam    Constitutional: Vital signs are normal. She appears well-developed and well-nourished.   HENT:   Head: Normocephalic and atraumatic.   Right Ear: Hearing normal.   Left Ear: Hearing normal.   Eyes: Conjunctivae are normal.   Cardiovascular: Normal rate and regular rhythm.   Abdominal: Abdomen is soft. Bowel sounds are normal.   Musculoskeletal:         General: Normal range of motion.      Comments: Significant pain with range of motion of most joints primarily to the ankles and the knees  Compartments are tight     Neurological: She is alert and oriented to person, place, and time.   Skin: Skin is warm and intact. Rash noted. There is erythema.   Extreme redness, warmth and some pallor noted on the skin.  The tibial regions bilaterally appear to have bullae formation.   The majority of the skin appears to be affected throughout the body, sparing the neck and the head and the upper back.  Significant swelling noted to the lower extremities primarily   Psychiatric: She has a normal mood and affect. Her speech is normal and behavior is normal. Cognition and memory are normal.         ED Course   Procedures  Labs " Reviewed   CBC W/ AUTO DIFFERENTIAL - Abnormal; Notable for the following components:       Result Value    RBC 3.65 (*)     Hemoglobin 11.8 (*)      (*)     MCH 32.3 (*)     MCHC 31.5 (*)     All other components within normal limits   COMPREHENSIVE METABOLIC PANEL - Abnormal; Notable for the following components:    Alkaline Phosphatase 52 (*)     All other components within normal limits   C-REACTIVE PROTEIN - Abnormal; Notable for the following components:    CRP 10.3 (*)     All other components within normal limits   SEDIMENTATION RATE   POCT URINE PREGNANCY   POCT GLUCOSE          Imaging Results          X-Ray Tibia Fibula Bilateral (Final result)  Result time 05/07/22 03:01:45    Final result by Deni Leiws MD (05/07/22 03:01:45)                 Impression:      Suspected asymmetric soft tissue swelling about the right ankle particularly laterally, there is no radiographically detectable radiopaque foreign body.    The osseous structures appear intact.  Clinical and historical correlation otherwise needed to determine need for additional follow-up.      Electronically signed by: Deni Lewis  Date:    05/07/2022  Time:    03:01             Narrative:    EXAMINATION:  XR TIBIA FIBULA BILATERAL    CLINICAL HISTORY:  Local infection of the skin and subcutaneous tissue, unspecified    TECHNIQUE:  AP and lateral views of the tibia/fibula bilaterally are submitted.    COMPARISON:  None    FINDINGS:  There is appearance suggesting asymmetric soft tissue swelling about the right ankle particularly laterally, clinical correlation is needed.  There is no radiographically detectable radiopaque soft tissue foreign body.    The osseous structures appear intact, chronic changes are noted, there is no evidence for acute fracture or dislocation.                                 Medications   vancomycin - pharmacy to dose (has no administration in time range)   vancomycin (VANCOCIN) 1,750 mg in dextrose 5 %  500 mL IVPB (1,750 mg Intravenous New Bag 5/7/22 0301)   piperacillin-tazobactam 4.5 g in dextrose 5 % 100 mL IVPB (ready to mix system) (0 g Intravenous Stopped 5/7/22 0257)     Medical Decision Making:   History:   Old Medical Records: I decided to obtain old medical records.  Old Records Summarized: records from clinic visits.  Initial Assessment:   41-year-old female presenting with skin rash  Differential Diagnosis:   Aidan Bal, TEN, cellulitis, Vibro, necrotizing fasciitis    Clinical Tests:   Lab Tests: Ordered and Reviewed  Radiological Study: Ordered and Reviewed  ED Management:  Plan  Septic workup.  Initiate broad-spectrum antibiotics.  Plan on admission and general surgery consult    3:14 AM  No acute changes.   Labs are reassuring. Xray without GAS or osteo.   High suspicion for cellulitis, concern for rapid progression.   Plan on  admission and discussion with general surgery, but pt has elected to leave AGAINST MEDICAL ADVICE.                       Clinical Impression:   Final diagnoses:  [L08.9] Soft tissue infection  [L03.90] Cellulitis, unspecified cellulitis site (Primary)          ED Disposition Condition    Admit               Wilbert Reynoso PA-C  05/07/22 0315       Wilbert Reynoso PA-C  05/07/22 0318

## 2022-05-07 NOTE — ED TRIAGE NOTES
Patient identifiers for Nicole Collins 41 y.o. female checked and correct.  Chief Complaint   Patient presents with    Leg Swelling     Patient reports left leg swelling and pain, left arm rash noted states was seen in ED this morning was to be admitted had to take her child home and now has returned to be admitted.     transfer     Pt arrives from [facility] for full body rash that is red, hot. Upper extremity, lower extremities swollen, tender to the touch. Rash is scale like, denies pruritus.      Past Medical History:   Diagnosis Date    Bipolar 1 disorder     Cocaine abuse     DM mellitus, gestational     ETOH abuse     Hypertension     Methamphetamine abuse     Opiate overdose     Seizures     Trichomonas infection      Allergies reported:   Review of patient's allergies indicates:   Allergen Reactions    Tangerine Anaphylaxis    Sweet orange(citrus sinensis) Hives    Oranges [orange]          LOC: Patient is awake, alert, and aware of environment with an appropriate affect. Patient is oriented x 4 and speaking appropriately.  APPEARANCE: Patient is in no acute distress. Patient is clean and well groomed, patient's clothing is properly fastened.  SKIN: Patient has generalized rash with itching. Swelling noted to bilateral legs. The skin is warm, dry, and red. Patient has normal skin turgor and moist mucus membranes.   MUSKULOSKELETAL: Patient is moving all extremities. Patient reports pain to bilateral legs r/t swelling. Pulses intact.   RESPIRATORY: Airway is open and patent. Respirations are spontaneous and non-labored with normal effort and rate.  CARDIAC: Patient has a normal rate and rhythm. Peripheral edema noted.   ABDOMEN: No distention noted. Soft and non-tender upon palpation.  NEUROLOGICAL: PERRL. Facial expression is symmetrical. Hand grasps are equal bilaterally. Normal sensation in all extremities when touched with finger.

## 2022-05-07 NOTE — ED TRIAGE NOTES
Pt. Was here last night with her child and was diagnosed with possible cellulitis on bilateral arms, bilateral legs, and stomach. Pt. Was supposed to be admitted overnight, but she had to bring her child home. Pt. Is now back to be admitted. Pt. Rates her pain at a 9/10 on the pain scale.
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
## 2022-05-07 NOTE — ED NOTES
Charge RN at bedside to speak with pt about admission. Pt states after abx she will bring her children home and come back for admission.

## 2022-05-07 NOTE — SUBJECTIVE & OBJECTIVE
Current Facility-Administered Medications on File Prior to Encounter   Medication    [COMPLETED] hydrALAZINE injection 20 mg    [COMPLETED] piperacillin-tazobactam 4.5 g in dextrose 5 % 100 mL IVPB (ready to mix system)    [COMPLETED] vancomycin (VANCOCIN) 1,750 mg in dextrose 5 % 500 mL IVPB    [DISCONTINUED] vancomycin - pharmacy to dose    [DISCONTINUED] vancomycin 1.25 g in dextrose 5% 250 mL IVPB (ready to mix)     Current Outpatient Medications on File Prior to Encounter   Medication Sig    acetaminophen (TYLENOL) 325 MG tablet Take 2 tablets (650 mg total) by mouth every 6 (six) hours as needed for Pain (or fever).    ALPRAZolam (XANAX) 1 MG tablet Take 1 mg by mouth.    amLODIPine (NORVASC) 10 MG tablet Take 1 tablet (10 mg total) by mouth once daily.    albuterol sulfate 2.5 mg/0.5 mL Nebu Take 2.5 mg by nebulization every 4 (four) hours as needed (cough and wheezing). Rescue    cetirizine (ZYRTEC) 10 MG tablet Take 1 tablet (10 mg total) by mouth once daily.    clindamycin (CLEOCIN) 150 MG capsule Take 3 capsules (450 mg total) by mouth every 8 (eight) hours. for 7 days    dextroamphetamine-amphetamine 30 mg Tab Take 1 tablet by mouth 2 (two) times daily.    EScitalopram oxalate (LEXAPRO) 20 MG tablet Take 20 mg by mouth every morning.    famotidine (PEPCID) 20 MG tablet Take 1 tablet (20 mg total) by mouth 2 (two) times daily.    hydroCHLOROthiazide (HYDRODIURIL) 25 MG tablet Take 1 tablet (25 mg total) by mouth once daily.    ibuprofen (ADVIL,MOTRIN) 600 MG tablet Take 1 tablet (600 mg total) by mouth every 6 (six) hours as needed for Pain.    levoFLOXacin (LEVAQUIN) 750 MG tablet Take 1 tablet (750 mg total) by mouth once daily. for 7 days    meloxicam (MOBIC) 15 MG tablet Take 15 mg by mouth once daily.    menthol 3.2 mg Lozg by Mucous Membrane route.    methocarbamol (ROBAXIN) 500 MG Tab Take 500 mg by mouth 4 (four) times daily.    metroNIDAZOLE (FLAGYL) 500 MG tablet Take 1 tablet (500 mg total) by  mouth every 12 (twelve) hours. for 7 days    naloxone (NARCAN) 4 mg/actuation Spry 4mg by nasal route as needed for opioid overdose; may repeat every 2-3 minutes in alternating nostrils until medical help arrives. Call 911    ondansetron (ZOFRAN-ODT) 4 MG TbDL Take 1 tablet (4 mg total) by mouth every 8 (eight) hours as needed.    phenytoin (DILANTIN) 100 MG ER capsule Take 1 capsule (100 mg total) by mouth 3 (three) times daily.    promethazine (PHENERGAN) 25 MG tablet Take 1 tablet (25 mg total) by mouth every 6 (six) hours as needed for Nausea.    QUEtiapine (SEROQUEL) 300 MG Tab Take 300 mg by mouth nightly.    quetiapine (SEROQUEL) 50 MG tablet Take 400 mg by mouth every evening.    [DISCONTINUED] amLODIPine (NORVASC) 5 MG tablet Take 5 mg by mouth once daily.    [DISCONTINUED] cloNIDine (CATAPRES) 0.1 MG tablet Take 0.1 mg by mouth every evening.    [DISCONTINUED] losartan-hydrochlorothiazide 100-12.5 mg (HYZAAR) 100-12.5 mg Tab Take 1 tablet by mouth once daily.       Review of patient's allergies indicates:   Allergen Reactions    Tangerine Anaphylaxis    Sweet orange(citrus sinensis) Hives    Oranges [orange]        Past Medical History:   Diagnosis Date    Bipolar 1 disorder     Cocaine abuse     DM mellitus, gestational     ETOH abuse     Hypertension     Methamphetamine abuse     Opiate overdose     Seizures     Trichomonas infection      Past Surgical History:   Procedure Laterality Date     SECTION, LOW TRANSVERSE      x 3    ECTOPIC PREGNANCY SURGERY      KNEE SURGERY  2010    left knee    SALPINGECTOMY      THYROID SURGERY      TUBAL LIGATION       Family History       Problem Relation (Age of Onset)    Cancer Mother, Father, Maternal Grandmother, Maternal Grandfather    Heart disease Mother          Tobacco Use    Smoking status: Current Some Day Smoker     Types: Cigars, Cigarettes    Smokeless tobacco: Never Used    Tobacco comment: Patient states she smokes 1 cigar per week.   Substance  "and Sexual Activity    Alcohol use: Yes     Comment: binge drinker    Drug use: Yes     Types: Marijuana, "Crack" cocaine, MDMA (Ecstacy), Amphetamines, Cocaine, Methamphetamines, Heroin    Sexual activity: Not Currently     Review of Systems   Constitutional: Negative.  Negative for fever.   HENT: Negative.     Eyes: Negative.    Respiratory: Negative.     Cardiovascular: Negative.    Gastrointestinal: Negative.    Endocrine: Negative.    Genitourinary: Negative.    Musculoskeletal: Negative.         RLE below knee pain and tenderness   Skin:  Positive for color change and wound. Negative for rash.        Itches all over   Allergic/Immunologic: Negative.    Neurological: Negative.    Hematological: Negative.    Psychiatric/Behavioral: Negative.     Objective:     Vital Signs (Most Recent):  Temp: 97.9 °F (36.6 °C) (05/07/22 0923)  Pulse: 90 (05/07/22 1358)  Resp: 18 (05/07/22 1358)  BP: 111/70 (05/07/22 1358)  SpO2: 100 % (05/07/22 1358) Vital Signs (24h Range):  Temp:  [97.9 °F (36.6 °C)-98.3 °F (36.8 °C)] 97.9 °F (36.6 °C)  Pulse:  [] 90  Resp:  [15-30] 18  SpO2:  [97 %-100 %] 100 %  BP: (111-201)/() 111/70     Weight: 89.8 kg (198 lb)  Body mass index is 31.96 kg/m².    Physical Exam  Constitutional:       General: She is not in acute distress.     Appearance: She is not toxic-appearing.   Cardiovascular:      Rate and Rhythm: Normal rate and regular rhythm.      Pulses: Normal pulses.   Pulmonary:      Effort: Pulmonary effort is normal. No respiratory distress.   Abdominal:      General: There is no distension.      Palpations: Abdomen is soft.      Tenderness: There is no abdominal tenderness.   Skin:     General: Skin is warm.      Comments: She has full body patchy erythema  There is scaling up the torso and arm  She is scratching her upper extremities and torso a lot  She is scaling on the back where she can't reach too though  The bilateral lower extremities swollen  Only the right side below " the knee is tender  There is no crepitus  There is no focal abscess   There is no compartment syndrome   Neurological:      General: No focal deficit present.      Mental Status: She is alert and oriented to person, place, and time.             Significant Labs:  I have reviewed all pertinent lab results within the past 24 hours.  CBC:   Recent Labs   Lab 05/07/22  1108   WBC 6.69   RBC 3.59*   HGB 11.8*   HCT 36.7*      *   MCH 32.9*   MCHC 32.2     CMP:   Recent Labs   Lab 05/07/22  0216      CALCIUM 9.3   ALBUMIN 3.5   PROT 7.0      K 3.8   CO2 24      BUN 13   CREATININE 1.0   ALKPHOS 52*   ALT 26   AST 26   BILITOT 0.2       Significant Diagnostics:  I have reviewed all pertinent imaging results/findings within the past 24 hours.    Ct of lower extremities without subcutaneous air or other findings of nec fasc
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
## 2022-05-07 NOTE — SUBJECTIVE & OBJECTIVE
Past Medical History:   Diagnosis Date    Bipolar 1 disorder     Cocaine abuse     DM mellitus, gestational     ETOH abuse     Hypertension     Methamphetamine abuse     Opiate overdose     Seizures     Trichomonas infection        Past Surgical History:   Procedure Laterality Date     SECTION, LOW TRANSVERSE      x 3    ECTOPIC PREGNANCY SURGERY      KNEE SURGERY  2010    left knee    SALPINGECTOMY      THYROID SURGERY      TUBAL LIGATION         Review of patient's allergies indicates:   Allergen Reactions    Tangerine Anaphylaxis    Sweet orange(citrus sinensis) Hives    Oranges [orange]        Current Facility-Administered Medications on File Prior to Encounter   Medication    [COMPLETED] hydrALAZINE injection 20 mg    [COMPLETED] piperacillin-tazobactam 4.5 g in dextrose 5 % 100 mL IVPB (ready to mix system)    [COMPLETED] vancomycin (VANCOCIN) 1,750 mg in dextrose 5 % 500 mL IVPB    [DISCONTINUED] vancomycin - pharmacy to dose    [DISCONTINUED] vancomycin 1.25 g in dextrose 5% 250 mL IVPB (ready to mix)     Current Outpatient Medications on File Prior to Encounter   Medication Sig    acetaminophen (TYLENOL) 325 MG tablet Take 2 tablets (650 mg total) by mouth every 6 (six) hours as needed for Pain (or fever).    ALPRAZolam (XANAX) 1 MG tablet Take 1 mg by mouth.    amLODIPine (NORVASC) 10 MG tablet Take 1 tablet (10 mg total) by mouth once daily.    albuterol sulfate 2.5 mg/0.5 mL Nebu Take 2.5 mg by nebulization every 4 (four) hours as needed (cough and wheezing). Rescue    cetirizine (ZYRTEC) 10 MG tablet Take 1 tablet (10 mg total) by mouth once daily.    clindamycin (CLEOCIN) 150 MG capsule Take 3 capsules (450 mg total) by mouth every 8 (eight) hours. for 7 days    dextroamphetamine-amphetamine 30 mg Tab Take 1 tablet by mouth 2 (two) times daily.    EScitalopram oxalate (LEXAPRO) 20 MG tablet Take 20 mg by mouth every morning.    famotidine (PEPCID) 20 MG tablet Take 1 tablet (20 mg total) by  mouth 2 (two) times daily.    hydroCHLOROthiazide (HYDRODIURIL) 25 MG tablet Take 1 tablet (25 mg total) by mouth once daily.    ibuprofen (ADVIL,MOTRIN) 600 MG tablet Take 1 tablet (600 mg total) by mouth every 6 (six) hours as needed for Pain.    levoFLOXacin (LEVAQUIN) 750 MG tablet Take 1 tablet (750 mg total) by mouth once daily. for 7 days    meloxicam (MOBIC) 15 MG tablet Take 15 mg by mouth once daily.    menthol 3.2 mg Lozg by Mucous Membrane route.    methocarbamol (ROBAXIN) 500 MG Tab Take 500 mg by mouth 4 (four) times daily.    metroNIDAZOLE (FLAGYL) 500 MG tablet Take 1 tablet (500 mg total) by mouth every 12 (twelve) hours. for 7 days    naloxone (NARCAN) 4 mg/actuation Spry 4mg by nasal route as needed for opioid overdose; may repeat every 2-3 minutes in alternating nostrils until medical help arrives. Call 911    ondansetron (ZOFRAN-ODT) 4 MG TbDL Take 1 tablet (4 mg total) by mouth every 8 (eight) hours as needed.    phenytoin (DILANTIN) 100 MG ER capsule Take 1 capsule (100 mg total) by mouth 3 (three) times daily.    promethazine (PHENERGAN) 25 MG tablet Take 1 tablet (25 mg total) by mouth every 6 (six) hours as needed for Nausea.    QUEtiapine (SEROQUEL) 300 MG Tab Take 300 mg by mouth nightly.    quetiapine (SEROQUEL) 50 MG tablet Take 400 mg by mouth every evening.    [DISCONTINUED] amLODIPine (NORVASC) 5 MG tablet Take 5 mg by mouth once daily.    [DISCONTINUED] cloNIDine (CATAPRES) 0.1 MG tablet Take 0.1 mg by mouth every evening.    [DISCONTINUED] losartan-hydrochlorothiazide 100-12.5 mg (HYZAAR) 100-12.5 mg Tab Take 1 tablet by mouth once daily.     Family History       Problem Relation (Age of Onset)    Cancer Mother, Father, Maternal Grandmother, Maternal Grandfather    Heart disease Mother          Tobacco Use    Smoking status: Current Some Day Smoker     Types: Cigars, Cigarettes    Smokeless tobacco: Never Used    Tobacco comment: Patient states she smokes 1 cigar per week.  "  Substance and Sexual Activity    Alcohol use: Yes     Comment: binge drinker    Drug use: Yes     Types: Marijuana, "Crack" cocaine, MDMA (Ecstacy), Amphetamines, Cocaine, Methamphetamines, Heroin    Sexual activity: Not Currently     Review of Systems   Constitutional:  Positive for appetite change. Negative for chills and fever.   HENT:  Negative for trouble swallowing.    Eyes:  Negative for visual disturbance.   Respiratory:  Negative for cough and shortness of breath.    Cardiovascular:  Negative for chest pain.   Gastrointestinal:  Negative for abdominal pain, constipation, diarrhea, nausea and vomiting.   Genitourinary:  Negative for difficulty urinating.   Musculoskeletal:  Positive for myalgias.   Skin:  Positive for color change and rash.   Neurological:  Positive for light-headedness. Negative for speech difficulty.   Psychiatric/Behavioral:  Negative for agitation and confusion.    Objective:     Vital Signs (Most Recent):  Temp: 97.9 °F (36.6 °C) (05/07/22 0923)  Pulse: 94 (05/07/22 1720)  Resp: 18 (05/07/22 1809)  BP: 113/64 (05/07/22 1720)  SpO2: 100 % (05/07/22 1720) Vital Signs (24h Range):  Temp:  [97.9 °F (36.6 °C)-98.3 °F (36.8 °C)] 97.9 °F (36.6 °C)  Pulse:  [] 94  Resp:  [15-30] 18  SpO2:  [97 %-100 %] 100 %  BP: (111-201)/() 113/64     Weight: 89.8 kg (198 lb)  Body mass index is 31.96 kg/m².    Physical Exam  Constitutional:       General: She is in acute distress.   HENT:      Head: Normocephalic and atraumatic.      Nose: Nose normal.   Eyes:      Extraocular Movements: Extraocular movements intact.      Conjunctiva/sclera: Conjunctivae normal.   Cardiovascular:      Rate and Rhythm: Normal rate and regular rhythm.      Heart sounds: Normal heart sounds. No murmur heard.  Pulmonary:      Effort: Pulmonary effort is normal. No respiratory distress.      Breath sounds: Normal breath sounds. No wheezing or rhonchi.   Abdominal:      General: Bowel sounds are normal.      " Palpations: Abdomen is soft.      Tenderness: There is no abdominal tenderness.   Musculoskeletal:         General: Swelling and tenderness (RLE) present.      Cervical back: Normal range of motion.   Skin:     General: Skin is warm and dry.      Findings: Rash (See photos in ED note. Erythematous rash on BL LE. Tenderness to light touch throughout RLE. Dry, scaly rash on UE, abdomen, and back, less severe than LE.) present.   Neurological:      General: No focal deficit present.      Mental Status: She is alert and oriented to person, place, and time.   Psychiatric:         Mood and Affect: Affect is tearful.           Significant Labs: All pertinent labs within the past 24 hours have been reviewed.  CBC:   Recent Labs   Lab 05/07/22  0216 05/07/22  1108   WBC 5.96 6.69   HGB 11.8* 11.8*   HCT 37.5 36.7*    291     CMP:   Recent Labs   Lab 05/07/22  0216      K 3.8      CO2 24      BUN 13   CREATININE 1.0   CALCIUM 9.3   PROT 7.0   ALBUMIN 3.5   BILITOT 0.2   ALKPHOS 52*   AST 26   ALT 26   ANIONGAP 9   EGFRNONAA >60     Lactic Acid:   Recent Labs   Lab 05/07/22  1106   LACTATE 2.8*       Significant Imaging: I have reviewed all pertinent imaging results/findings within the past 24 hours.

## 2022-05-07 NOTE — DISCHARGE INSTRUCTIONS

## 2022-05-07 NOTE — PROGRESS NOTES
Pharmacokinetic Initial Assessment: IV Vancomycin    Assessment/Plan:    Initiate intravenous vancomycin with loading dose of 1750 mg once followed by a maintenance dose of vancomycin 1250mg IV every 12 hours  Desired empiric serum trough concentration is 10 to 20 mcg/mL  Draw vancomycin trough level 60 min prior to fourth dose on 5/8/22 at approximately 14:00  Pharmacy will continue to follow and monitor vancomycin.      Please contact pharmacy at extension [phone] with any questions regarding this assessment.     Thank you for the consult,   Olga Lange       Patient brief summary:  Nicole Collins is a 41 y.o. female initiated on antimicrobial therapy with IV Vancomycin for treatment of suspected skin & soft tissue infection    Drug Allergies:   Review of patient's allergies indicates:   Allergen Reactions    Tangerine Anaphylaxis    Sweet orange(citrus sinensis) Hives    Oranges [orange]        Actual Body Weight:   86.2 kg    Renal Function:   Estimated Creatinine Clearance: 81.1 mL/min (based on SCr of 1 mg/dL).,     Dialysis Method (if applicable):  N/A    CBC (last 72 hours):  Recent Labs   Lab Result Units 05/07/22  0216   WBC K/uL 5.96   Hemoglobin g/dL 11.8*   Hematocrit % 37.5   Platelets K/uL 309   Gran % % 58.1   Lymph % % 28.7   Mono % % 9.6   Eosinophil % % 3.0   Basophil % % 0.3   Differential Method  Automated       Metabolic Panel (last 72 hours):  Recent Labs   Lab Result Units 05/07/22  0216   Sodium mmol/L 140   Potassium mmol/L 3.8   Chloride mmol/L 107   CO2 mmol/L 24   Glucose mg/dL 101   BUN mg/dL 13   Creatinine mg/dL 1.0   Albumin g/dL 3.5   Total Bilirubin mg/dL 0.2   Alkaline Phosphatase U/L 52*   AST U/L 26   ALT U/L 26       Drug levels (last 3 results):  No results for input(s): VANCOMYCINRA, VANCOMYCINPE, VANCOMYCINTR in the last 72 hours.    Microbiologic Results:  Microbiology Results (last 7 days)       ** No results found for the last 168 hours. **
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
## 2022-05-07 NOTE — ED NOTES
Pt states she does not want to be admitted because she doesn't have any way for her children to get home that are waiting in the lobby. MD notified.

## 2022-05-07 NOTE — ASSESSMENT & PLAN NOTE
41F with HTN, bipolar I, substance abuse presents with full body erythematous and desquamating rash with RLE below knee cellulitis that is tender. CT does not show focal collection or air. Her LRINEC score is 1. This is not nec fasc at this time.    Agree with broad spec antibiotics including vibrio coverage  Recommend derm consult for rash; does not seem infectious since it is everywhere and despite this, she is afebrile and with normal WBC  Recommend medicine evaluation for admission  Will follow to ensure cellulitis improves with antibiotics

## 2022-05-08 LAB
ALBUMIN SERPL BCP-MCNC: 3.1 G/DL (ref 3.5–5.2)
ALP SERPL-CCNC: 46 U/L (ref 55–135)
ALT SERPL W/O P-5'-P-CCNC: 24 U/L (ref 10–44)
ANION GAP SERPL CALC-SCNC: 11 MMOL/L (ref 8–16)
AST SERPL-CCNC: 21 U/L (ref 10–40)
BASOPHILS # BLD AUTO: 0.02 K/UL (ref 0–0.2)
BASOPHILS NFR BLD: 0.3 % (ref 0–1.9)
BILIRUB SERPL-MCNC: 0.3 MG/DL (ref 0.1–1)
BUN SERPL-MCNC: 7 MG/DL (ref 6–20)
CALCIUM SERPL-MCNC: 9.1 MG/DL (ref 8.7–10.5)
CHLORIDE SERPL-SCNC: 107 MMOL/L (ref 95–110)
CO2 SERPL-SCNC: 22 MMOL/L (ref 23–29)
CREAT SERPL-MCNC: 0.8 MG/DL (ref 0.5–1.4)
DIFFERENTIAL METHOD: ABNORMAL
EOSINOPHIL # BLD AUTO: 0.1 K/UL (ref 0–0.5)
EOSINOPHIL NFR BLD: 0.9 % (ref 0–8)
ERYTHROCYTE [DISTWIDTH] IN BLOOD BY AUTOMATED COUNT: 13.1 % (ref 11.5–14.5)
EST. GFR  (AFRICAN AMERICAN): >60 ML/MIN/1.73 M^2
EST. GFR  (NON AFRICAN AMERICAN): >60 ML/MIN/1.73 M^2
GLUCOSE SERPL-MCNC: 97 MG/DL (ref 70–110)
HCT VFR BLD AUTO: 35.8 % (ref 37–48.5)
HGB BLD-MCNC: 11.3 G/DL (ref 12–16)
IMM GRANULOCYTES # BLD AUTO: 0.01 K/UL (ref 0–0.04)
IMM GRANULOCYTES NFR BLD AUTO: 0.2 % (ref 0–0.5)
LACTATE SERPL-SCNC: 1.8 MMOL/L (ref 0.5–2.2)
LYMPHOCYTES # BLD AUTO: 2 K/UL (ref 1–4.8)
LYMPHOCYTES NFR BLD: 30.5 % (ref 18–48)
MAGNESIUM SERPL-MCNC: 1.9 MG/DL (ref 1.6–2.6)
MCH RBC QN AUTO: 32.5 PG (ref 27–31)
MCHC RBC AUTO-ENTMCNC: 31.6 G/DL (ref 32–36)
MCV RBC AUTO: 103 FL (ref 82–98)
MONOCYTES # BLD AUTO: 0.6 K/UL (ref 0.3–1)
MONOCYTES NFR BLD: 9.6 % (ref 4–15)
NEUTROPHILS # BLD AUTO: 3.9 K/UL (ref 1.8–7.7)
NEUTROPHILS NFR BLD: 58.5 % (ref 38–73)
NRBC BLD-RTO: 0 /100 WBC
PHOSPHATE SERPL-MCNC: 3.5 MG/DL (ref 2.7–4.5)
PLATELET # BLD AUTO: 323 K/UL (ref 150–450)
PMV BLD AUTO: 10.4 FL (ref 9.2–12.9)
POCT GLUCOSE: 66 MG/DL (ref 70–110)
POTASSIUM SERPL-SCNC: 3.7 MMOL/L (ref 3.5–5.1)
PROT SERPL-MCNC: 6.2 G/DL (ref 6–8.4)
RBC # BLD AUTO: 3.48 M/UL (ref 4–5.4)
SODIUM SERPL-SCNC: 140 MMOL/L (ref 136–145)
WBC # BLD AUTO: 6.66 K/UL (ref 3.9–12.7)

## 2022-05-08 PROCEDURE — 25000003 PHARM REV CODE 250: Performed by: STUDENT IN AN ORGANIZED HEALTH CARE EDUCATION/TRAINING PROGRAM

## 2022-05-08 PROCEDURE — 85025 COMPLETE CBC W/AUTO DIFF WBC: CPT

## 2022-05-08 PROCEDURE — 63600175 PHARM REV CODE 636 W HCPCS: Performed by: STUDENT IN AN ORGANIZED HEALTH CARE EDUCATION/TRAINING PROGRAM

## 2022-05-08 PROCEDURE — 36415 COLL VENOUS BLD VENIPUNCTURE: CPT

## 2022-05-08 PROCEDURE — 99225 PR SUBSEQUENT OBSERVATION CARE,LEVEL II: ICD-10-PCS | Mod: ,,, | Performed by: HOSPITALIST

## 2022-05-08 PROCEDURE — 80053 COMPREHEN METABOLIC PANEL: CPT

## 2022-05-08 PROCEDURE — 84100 ASSAY OF PHOSPHORUS: CPT

## 2022-05-08 PROCEDURE — 99225 PR SUBSEQUENT OBSERVATION CARE,LEVEL II: CPT | Mod: ,,, | Performed by: HOSPITALIST

## 2022-05-08 PROCEDURE — 96372 THER/PROPH/DIAG INJ SC/IM: CPT

## 2022-05-08 PROCEDURE — 25000003 PHARM REV CODE 250

## 2022-05-08 PROCEDURE — 99900031 HC PATIENT EDUCATION (STAT)

## 2022-05-08 PROCEDURE — 96366 THER/PROPH/DIAG IV INF ADDON: CPT

## 2022-05-08 PROCEDURE — 94761 N-INVAS EAR/PLS OXIMETRY MLT: CPT

## 2022-05-08 PROCEDURE — 99406 BEHAV CHNG SMOKING 3-10 MIN: CPT

## 2022-05-08 PROCEDURE — G0378 HOSPITAL OBSERVATION PER HR: HCPCS

## 2022-05-08 PROCEDURE — 63600175 PHARM REV CODE 636 W HCPCS

## 2022-05-08 PROCEDURE — 83735 ASSAY OF MAGNESIUM: CPT

## 2022-05-08 RX ORDER — OXYCODONE HYDROCHLORIDE 5 MG/1
5 TABLET ORAL EVERY 4 HOURS PRN
Status: DISPENSED | OUTPATIENT
Start: 2022-05-08 | End: 2022-05-09

## 2022-05-08 RX ADMIN — SODIUM CHLORIDE, SODIUM LACTATE, POTASSIUM CHLORIDE, AND CALCIUM CHLORIDE 500 ML: .6; .31; .03; .02 INJECTION, SOLUTION INTRAVENOUS at 10:05

## 2022-05-08 RX ADMIN — OXYCODONE 5 MG: 5 TABLET ORAL at 08:05

## 2022-05-08 RX ADMIN — QUETIAPINE FUMARATE 300 MG: 300 TABLET ORAL at 09:05

## 2022-05-08 RX ADMIN — ALPRAZOLAM 1 MG: 1 TABLET ORAL at 08:05

## 2022-05-08 RX ADMIN — PHENYTOIN SODIUM 100 MG: 100 CAPSULE ORAL at 08:05

## 2022-05-08 RX ADMIN — FAMOTIDINE 20 MG: 20 TABLET ORAL at 08:05

## 2022-05-08 RX ADMIN — ENOXAPARIN SODIUM 40 MG: 100 INJECTION SUBCUTANEOUS at 05:05

## 2022-05-08 RX ADMIN — DOXYCYCLINE HYCLATE 100 MG: 100 TABLET, COATED ORAL at 08:05

## 2022-05-08 RX ADMIN — ESCITALOPRAM OXALATE 20 MG: 20 TABLET ORAL at 07:05

## 2022-05-08 RX ADMIN — PHENYTOIN SODIUM 100 MG: 100 CAPSULE ORAL at 02:05

## 2022-05-08 RX ADMIN — DIPHENHYDRAMINE HYDROCHLORIDE 25 MG: 25 CAPSULE ORAL at 08:05

## 2022-05-08 RX ADMIN — DIPHENHYDRAMINE HYDROCHLORIDE 25 MG: 25 CAPSULE ORAL at 05:05

## 2022-05-08 RX ADMIN — CEFTRIAXONE 1 G: 1 INJECTION, SOLUTION INTRAVENOUS at 09:05

## 2022-05-08 NOTE — SUBJECTIVE & OBJECTIVE
Interval History: NAEO. Pt reports improvement in RLE pain today and significant improvement in itching with benadryl. Lactate trended down.      Review of Systems   Constitutional:  Positive for appetite change. Negative for chills and fever.   HENT:  Negative for trouble swallowing.    Eyes:  Negative for visual disturbance.   Respiratory:  Negative for cough and shortness of breath.    Cardiovascular:  Negative for chest pain.   Gastrointestinal:  Negative for abdominal pain, constipation, diarrhea, nausea and vomiting.   Genitourinary:  Negative for difficulty urinating.   Musculoskeletal:  Positive for myalgias.   Skin:  Positive for color change and rash.   Neurological:  Negative for speech difficulty.   Psychiatric/Behavioral:  Negative for agitation and confusion.    Objective:     Vital Signs (Most Recent):  Temp: 97.8 °F (36.6 °C) (05/08/22 0810)  Pulse: 77 (05/08/22 0810)  Resp: 16 (05/08/22 0810)  BP: 112/70 (05/08/22 0810)  SpO2: 97 % (05/08/22 0810) Vital Signs (24h Range):  Temp:  [97.7 °F (36.5 °C)-98.1 °F (36.7 °C)] 97.8 °F (36.6 °C)  Pulse:  [77-95] 77  Resp:  [16-18] 16  SpO2:  [94 %-100 %] 97 %  BP: ()/(55-98) 112/70     Weight: 94.3 kg (208 lb)  Body mass index is 33.59 kg/m².    Intake/Output Summary (Last 24 hours) at 5/8/2022 1138  Last data filed at 5/7/2022 1930  Gross per 24 hour   Intake 1141 ml   Output --   Net 1141 ml      Physical Exam  Constitutional:       General: She is not in acute distress.  HENT:      Head: Normocephalic and atraumatic.      Nose: Nose normal.   Eyes:      Extraocular Movements: Extraocular movements intact.      Conjunctiva/sclera: Conjunctivae normal.   Cardiovascular:      Rate and Rhythm: Normal rate and regular rhythm.      Heart sounds: Normal heart sounds. No murmur heard.  Pulmonary:      Effort: Pulmonary effort is normal. No respiratory distress.      Breath sounds: Normal breath sounds. No wheezing or rhonchi.   Abdominal:      General: Bowel  sounds are normal.      Palpations: Abdomen is soft.      Tenderness: There is no abdominal tenderness.   Musculoskeletal:         General: Swelling and tenderness (RLE) present.      Cervical back: Normal range of motion.   Skin:     General: Skin is warm and dry.      Findings: Rash (See photos in ED note. Erythematous rash on BL LE. Tenderness to light touch throughout RLE. Dry, scaly rash on UE, abdomen, and back, less severe than LE.) present.   Neurological:      General: No focal deficit present.      Mental Status: She is alert and oriented to person, place, and time.   Psychiatric:         Mood and Affect: Mood normal.         Behavior: Behavior normal.       Significant Labs: All pertinent labs within the past 24 hours have been reviewed.    Significant Imaging: I have reviewed all pertinent imaging results/findings within the past 24 hours.
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
## 2022-05-08 NOTE — PLAN OF CARE
Patient is awake, alert and oriented x 4.  Ambulates with steady gait and remains free from falls.  Patient given prn Benadryl x 2 for itching with some relief.  Patient tolerating diet.  No complaints of pain at this time. No other complaints at this time.

## 2022-05-08 NOTE — NURSING
Pt arrived from ER, very anxious about taking a shower, vitals obtained, oriented to room, and call bell.  Plan of care reviewed. Oriented to emergency call bell in bathroom, will complete admission once shower is complete.
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
## 2022-05-08 NOTE — ASSESSMENT & PLAN NOTE
41F with HTN, bipolar I, substance abuse presents with full body erythematous and desquamating rash with RLE below knee cellulitis that is tender. CT does not show focal collection or air. Her LRINEC score is 1. This is not nec fasc at this time.    - patient seen and examined this morning  - labs reviewed  - left lower extremity pain improved as compared to presentation; remains tender to touch  - continue antibiotics  - no surgical intervention at this time  - rest of care per primary

## 2022-05-08 NOTE — SUBJECTIVE & OBJECTIVE
Interval History: Patient seen and examined this morning. No acute events overnight. Patient states right leg pain improved as compared to presentation. Continues to endorse tenderness to touch. Range of motion fully intact.    Medications:  Continuous Infusions:  Scheduled Meds:   cefTRIAXone (ROCEPHIN) IVPB  1 g Intravenous Q24H    doxycycline  100 mg Oral Q12H    enoxaparin  40 mg Subcutaneous Daily    EScitalopram oxalate  20 mg Oral QAM    famotidine  20 mg Oral BID    phenytoin  100 mg Oral TID    QUEtiapine  300 mg Oral Nightly     PRN Meds:albuterol sulfate, ALPRAZolam, dextrose 10%, dextrose 10%, diphenhydrAMINE, glucagon (human recombinant), glucose, glucose, ondansetron, sodium chloride 0.9%     Review of patient's allergies indicates:   Allergen Reactions    Tangerine Anaphylaxis    Sweet orange(citrus sinensis) Hives    Oranges [orange]      Objective:     Vital Signs (Most Recent):  Temp: 97.8 °F (36.6 °C) (05/08/22 0810)  Pulse: 77 (05/08/22 0810)  Resp: 16 (05/08/22 0810)  BP: 112/70 (05/08/22 0810)  SpO2: 97 % (05/08/22 0810) Vital Signs (24h Range):  Temp:  [97.7 °F (36.5 °C)-98.1 °F (36.7 °C)] 97.8 °F (36.6 °C)  Pulse:  [77-95] 77  Resp:  [15-30] 16  SpO2:  [94 %-100 %] 97 %  BP: ()/(55-98) 112/70     Weight: 94.3 kg (208 lb)  Body mass index is 33.59 kg/m².    Intake/Output - Last 3 Shifts         05/06 0700  05/07 0659 05/07 0700  05/08 0659 05/08 0700  05/09 0659    IV Piggyback  1141     Total Intake(mL/kg)  1141 (12.1)     Net  +1141                    Physical Exam  Constitutional:       General: She is not in acute distress.     Appearance: She is not toxic-appearing.   Cardiovascular:      Rate and Rhythm: Normal rate and regular rhythm.      Pulses: Normal pulses.   Pulmonary:      Effort: Pulmonary effort is normal. No respiratory distress.   Abdominal:      General: There is no distension.      Palpations: Abdomen is soft.      Tenderness: There is no abdominal tenderness.    Musculoskeletal:         General: Normal range of motion.   Skin:     General: Skin is warm.      Comments: Patchy erythema and mild swelling to bilateral lower extremities appreciated  RLE remains tender to touch; non-tender with motion  No worsening warmth, erythema, swelling to right lower extremity as compared to left lower extremity  No drainage   Neurological:      General: No focal deficit present.      Mental Status: She is alert and oriented to person, place, and time.       Significant Labs:  I have reviewed all pertinent lab results within the past 24 hours.  CBC:   Recent Labs   Lab 05/08/22  0235   WBC 6.66   RBC 3.48*   HGB 11.3*   HCT 35.8*      *   MCH 32.5*   MCHC 31.6*     CMP:   Recent Labs   Lab 05/08/22  0235   GLU 97   CALCIUM 9.1   ALBUMIN 3.1*   PROT 6.2      K 3.7   CO2 22*      BUN 7   CREATININE 0.8   ALKPHOS 46*   ALT 24   AST 21   BILITOT 0.3       Significant Diagnostics:  I have reviewed all pertinent imaging results/findings within the past 24 hours.
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
## 2022-05-08 NOTE — HOSPITAL COURSE
Admitted for management of cellulitis. Abx continued with ceftriaxone and doxycycline for vibrio coverage. Gen Surg and dermatology consulted. Per gen surg, no evidence of necrotizing fasciitis so no indication for surgery, medical management only. Dermatology evaluated patient, state lesions likely atopic dermatitis with concomitant contact dermatitis, as well as possible component of cellulitis. Derm recommending triamcinolone cream and supportive skin care regimen. On 5/9 patient sxs much improved with PO benadryl and on abx. Pt transitioned from IV ctx to PO keflex and continued on doxy for 10-day course of abx. Pt stable for discharge on abx, steroid cream and with follow-up in dermatology clinic at [facility].
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
## 2022-05-08 NOTE — PLAN OF CARE
Problem: Skin or Soft Tissue Infection  Goal: Absence of Infection Signs and Symptoms  Outcome: Ongoing, Progressing     Problem: Adult Inpatient Plan of Care  Goal: Plan of Care Review  Outcome: Ongoing, Progressing     Problem: Adult Inpatient Plan of Care  Goal: Optimal Comfort and Wellbeing  Outcome: Ongoing, Progressing

## 2022-05-08 NOTE — ED NOTES
Telebox placed on pt. Pt is agitated and wants to leave. MD at bedside. Pt reassured and deescalated. MD ordering medication to help pt with itching. Pt being placed in transport.

## 2022-05-08 NOTE — ED NOTES
Received report from LAMBERT Almeida. Assumed care of pt.    Pt has had report called to the floor. Awaiting arrival of telebox.   Validated with unit secretary that telebox has been requested for.

## 2022-05-08 NOTE — ED NOTES
Pt is calm and aware that transport is in route. Pt states she is ready to get to her room upstairs so that she can take a shower. Pt denies any further needs at this time.

## 2022-05-08 NOTE — H&P
[facility] - Emergency Dept  [facility]  History & Physical    Patient Name: Nicole Collins  MRN: [id number]  Patient Class: OP- Observation  Admission Date: 5/7/2022  Attending Physician: Adria Johnson MD   Primary Care Provider: Sofi Kennedy MD         Patient information was obtained from patient and ER records.     Subjective:     Principal Problem:Cellulitis    Chief Complaint:   Chief Complaint   Patient presents with    Leg Swelling     Patient reports left leg swelling and pain, left arm rash noted states was seen in ED this morning was to be admitted had to take her child home and now has returned to be admitted.     transfer     Pt arrives from [facility] for full body rash that is red, hot. Upper extremity, lower extremities swollen, tender to the touch. Rash is scale like, denies pruritus.         HPI: Nicole Collins is a 42 y/o F with PMH of HTN, bipolar disorder, seizures who was transferred from [facility] for surgery evaluation of rash. She initially presented this morning, then left AMA because she had to take her children home, and then returned. She reports that the rash began about two weeks ago after changing her laundry detergent and body wash. She was seen in the ED and discharged with prednisone for whole body allergic dermatitis. She reports that the rash on her upper extremities is improving, but the rash on her lower extremities is worsening. She reports generalized itching and RLE pain. [place] who were missing, and has had her legs in the water, most recently on Thursday. She denies fevers, chills, nausea, vomiting, diarrhea.     In the ED, she was afebrile, /67, HR 86, O2 sat 100% on RA. Labs notable for CRP 10.3, lactate 2.8. WBC and ESR wnl. CT bilateral tib fib at  showed extensive soft tissue swelling without abscess or subcutaneous air. In the ED, she received a dose of ciprofloxacin, clindamycin,  doxycycline, and zosyn. 1L LR bolus, morphine 4 mg given. She also received a dose of vanc and zosyn during her initial ED visit today. She was evaluated by surgery, who recommend broad spectrum antibiotics for cellulitis. Per surgery, the rash is not nec fasc. Admitted to hospital medicine for further management.       Past Medical History:   Diagnosis Date    Bipolar 1 disorder     Cocaine abuse     DM mellitus, gestational     ETOH abuse     Hypertension     Methamphetamine abuse     Opiate overdose     Seizures     Trichomonas infection        Past Surgical History:   Procedure Laterality Date     SECTION, LOW TRANSVERSE      x 3    ECTOPIC PREGNANCY SURGERY      KNEE SURGERY  2010    left knee    SALPINGECTOMY      THYROID SURGERY      TUBAL LIGATION         Review of patient's allergies indicates:   Allergen Reactions    Tangerine Anaphylaxis    Sweet orange(citrus sinensis) Hives    Oranges [orange]        Current Facility-Administered Medications on File Prior to Encounter   Medication    [COMPLETED] hydrALAZINE injection 20 mg    [COMPLETED] piperacillin-tazobactam 4.5 g in dextrose 5 % 100 mL IVPB (ready to mix system)    [COMPLETED] vancomycin (VANCOCIN) 1,750 mg in dextrose 5 % 500 mL IVPB    [DISCONTINUED] vancomycin - pharmacy to dose    [DISCONTINUED] vancomycin 1.25 g in dextrose 5% 250 mL IVPB (ready to mix)     Current Outpatient Medications on File Prior to Encounter   Medication Sig    acetaminophen (TYLENOL) 325 MG tablet Take 2 tablets (650 mg total) by mouth every 6 (six) hours as needed for Pain (or fever).    ALPRAZolam (XANAX) 1 MG tablet Take 1 mg by mouth.    amLODIPine (NORVASC) 10 MG tablet Take 1 tablet (10 mg total) by mouth once daily.    albuterol sulfate 2.5 mg/0.5 mL Nebu Take 2.5 mg by nebulization every 4 (four) hours as needed (cough and wheezing). Rescue    cetirizine (ZYRTEC) 10 MG tablet Take 1 tablet (10 mg total) by mouth once daily.     clindamycin (CLEOCIN) 150 MG capsule Take 3 capsules (450 mg total) by mouth every 8 (eight) hours. for 7 days    dextroamphetamine-amphetamine 30 mg Tab Take 1 tablet by mouth 2 (two) times daily.    EScitalopram oxalate (LEXAPRO) 20 MG tablet Take 20 mg by mouth every morning.    famotidine (PEPCID) 20 MG tablet Take 1 tablet (20 mg total) by mouth 2 (two) times daily.    hydroCHLOROthiazide (HYDRODIURIL) 25 MG tablet Take 1 tablet (25 mg total) by mouth once daily.    ibuprofen (ADVIL,MOTRIN) 600 MG tablet Take 1 tablet (600 mg total) by mouth every 6 (six) hours as needed for Pain.    levoFLOXacin (LEVAQUIN) 750 MG tablet Take 1 tablet (750 mg total) by mouth once daily. for 7 days    meloxicam (MOBIC) 15 MG tablet Take 15 mg by mouth once daily.    menthol 3.2 mg Lozg by Mucous Membrane route.    methocarbamol (ROBAXIN) 500 MG Tab Take 500 mg by mouth 4 (four) times daily.    metroNIDAZOLE (FLAGYL) 500 MG tablet Take 1 tablet (500 mg total) by mouth every 12 (twelve) hours. for 7 days    naloxone (NARCAN) 4 mg/actuation Spry 4mg by nasal route as needed for opioid overdose; may repeat every 2-3 minutes in alternating nostrils until medical help arrives. Call 911    ondansetron (ZOFRAN-ODT) 4 MG TbDL Take 1 tablet (4 mg total) by mouth every 8 (eight) hours as needed.    phenytoin (DILANTIN) 100 MG ER capsule Take 1 capsule (100 mg total) by mouth 3 (three) times daily.    promethazine (PHENERGAN) 25 MG tablet Take 1 tablet (25 mg total) by mouth every 6 (six) hours as needed for Nausea.    QUEtiapine (SEROQUEL) 300 MG Tab Take 300 mg by mouth nightly.    quetiapine (SEROQUEL) 50 MG tablet Take 400 mg by mouth every evening.    [DISCONTINUED] amLODIPine (NORVASC) 5 MG tablet Take 5 mg by mouth once daily.    [DISCONTINUED] cloNIDine (CATAPRES) 0.1 MG tablet Take 0.1 mg by mouth every evening.    [DISCONTINUED] losartan-hydrochlorothiazide 100-12.5 mg (HYZAAR) 100-12.5 mg Tab Take 1 tablet by  "mouth once daily.     Family History       Problem Relation (Age of Onset)    Cancer Mother, Father, Maternal Grandmother, Maternal Grandfather    Heart disease Mother          Tobacco Use    Smoking status: Current Some Day Smoker     Types: Cigars, Cigarettes    Smokeless tobacco: Never Used    Tobacco comment: Patient states she smokes 1 cigar per week.   Substance and Sexual Activity    Alcohol use: Yes     Comment: binge drinker    Drug use: Yes     Types: Marijuana, "Crack" cocaine, MDMA (Ecstacy), Amphetamines, Cocaine, Methamphetamines, Heroin    Sexual activity: Not Currently     Review of Systems   Constitutional:  Positive for appetite change. Negative for chills and fever.   HENT:  Negative for trouble swallowing.    Eyes:  Negative for visual disturbance.   Respiratory:  Negative for cough and shortness of breath.    Cardiovascular:  Negative for chest pain.   Gastrointestinal:  Negative for abdominal pain, constipation, diarrhea, nausea and vomiting.   Genitourinary:  Negative for difficulty urinating.   Musculoskeletal:  Positive for myalgias.   Skin:  Positive for color change and rash.   Neurological:  Positive for light-headedness. Negative for speech difficulty.   Psychiatric/Behavioral:  Negative for agitation and confusion.    Objective:     Vital Signs (Most Recent):  Temp: 97.9 °F (36.6 °C) (05/07/22 0923)  Pulse: 94 (05/07/22 1720)  Resp: 18 (05/07/22 1809)  BP: 113/64 (05/07/22 1720)  SpO2: 100 % (05/07/22 1720) Vital Signs (24h Range):  Temp:  [97.9 °F (36.6 °C)-98.3 °F (36.8 °C)] 97.9 °F (36.6 °C)  Pulse:  [] 94  Resp:  [15-30] 18  SpO2:  [97 %-100 %] 100 %  BP: (111-201)/() 113/64     Weight: 89.8 kg (198 lb)  Body mass index is 31.96 kg/m².    Physical Exam  Constitutional:       General: She is in acute distress.   HENT:      Head: Normocephalic and atraumatic.      Nose: Nose normal.   Eyes:      Extraocular Movements: Extraocular movements intact.      " Conjunctiva/sclera: Conjunctivae normal.   Cardiovascular:      Rate and Rhythm: Normal rate and regular rhythm.      Heart sounds: Normal heart sounds. No murmur heard.  Pulmonary:      Effort: Pulmonary effort is normal. No respiratory distress.      Breath sounds: Normal breath sounds. No wheezing or rhonchi.   Abdominal:      General: Bowel sounds are normal.      Palpations: Abdomen is soft.      Tenderness: There is no abdominal tenderness.   Musculoskeletal:         General: Swelling and tenderness (RLE) present.      Cervical back: Normal range of motion.   Skin:     General: Skin is warm and dry.      Findings: Rash (See photos in ED note. Erythematous rash on BL LE. Tenderness to light touch throughout RLE. Dry, scaly rash on UE, abdomen, and back, less severe than LE.) present.   Neurological:      General: No focal deficit present.      Mental Status: She is alert and oriented to person, place, and time.   Psychiatric:         Mood and Affect: Affect is tearful.           Significant Labs: All pertinent labs within the past 24 hours have been reviewed.  CBC:   Recent Labs   Lab 05/07/22  0216 05/07/22  1108   WBC 5.96 6.69   HGB 11.8* 11.8*   HCT 37.5 36.7*    291     CMP:   Recent Labs   Lab 05/07/22  0216      K 3.8      CO2 24      BUN 13   CREATININE 1.0   CALCIUM 9.3   PROT 7.0   ALBUMIN 3.5   BILITOT 0.2   ALKPHOS 52*   AST 26   ALT 26   ANIONGAP 9   EGFRNONAA >60     Lactic Acid:   Recent Labs   Lab 05/07/22  1106   LACTATE 2.8*       Significant Imaging: I have reviewed all pertinent imaging results/findings within the past 24 hours.    Assessment/Plan:     * Cellulitis  40 y/o F presenting with generalized rash, with worsening in BL LE. [place] who [place]. She was recently evaluated in the ED for generalized allergic dermatitis, now possibly developed a superimposed infection. Received  ciprofloxacin, clindamycin, doxycycline, and zosyn in ED. Per gen surg, rash is not nec fasc. CRP 10.3, lactate 2.8. WBC wnl. Afebrile and hemodynamically stable.   - surgery following, recommend broad spectrum abx with vibrio coverage  - ceftriaxone and doxycycline   - f/u blood cultures   - f/u repeat lactate  - prn benadryl for itching     Bipolar 1 disorder  Continue home meds.  - seroquel 300 mg qhs   - lexapro 20 mg     HTN (hypertension)  Normotensive on admission. Continue home meds.  - amlodipine 10 mg  - hctz 25 mg       Seizure  - continue phenytoin         VTE Risk Mitigation (From admission, onward)         Ordered     enoxaparin injection 40 mg  Daily         05/07/22 1752     IP VTE HIGH RISK PATIENT  Once         05/07/22 1752     Place sequential compression device  Until discontinued         05/07/22 1752                   Kaylene Matta MD  Department of Hospital Medicine   [facility] - Emergency Dept

## 2022-05-08 NOTE — CONSULTS
[facility] - Telemetry Stepdown ([place] [address])  Dermatology  Consult Note    Patient Name: Nicole Collins  MRN: [id number]  Admission Date: 5/7/2022  Hospital Length of Stay: 0 days  Attending Physician: Adria Johnson MD  Primary Care Provider: Sofi Kennedy MD     Inpatient consult to Dermatology  Consult performed by: Delbert Holden MD  Consult ordered by: Kaylene Moreno MD  Reason for consult: Rash        Subjective:     Principal Problem:Cellulitis    HPI:  Consult acknowledged.  Resident spoke with primary team who requests that patient be seen on Monday morning.  Full consult to follow tomorrow.       No new subjective & objective note has been filed under this hospital service since the last note was generated.    Assessment/Plan:     No new Assessment & Plan notes have been filed under this hospital service since the last note was generated.  Service: Dermatology    Thank you for your consult. I will follow-up with patient. Please contact us if you have any additional questions.    Delbert Holden MD  Dermatology  [facility] - Telemetry Stepdown ([place]-[facility])

## 2022-05-09 VITALS
TEMPERATURE: 98 F | HEIGHT: 66 IN | OXYGEN SATURATION: 99 % | HEART RATE: 87 BPM | BODY MASS INDEX: 33.43 KG/M2 | DIASTOLIC BLOOD PRESSURE: 77 MMHG | WEIGHT: 208 LBS | RESPIRATION RATE: 16 BRPM | SYSTOLIC BLOOD PRESSURE: 162 MMHG

## 2022-05-09 PROBLEM — L30.9 ECZEMATOUS DERMATITIS: Status: ACTIVE | Noted: 2022-05-09

## 2022-05-09 LAB
ALBUMIN SERPL BCP-MCNC: 3.6 G/DL (ref 3.5–5.2)
ALP SERPL-CCNC: 56 U/L (ref 55–135)
ALT SERPL W/O P-5'-P-CCNC: 29 U/L (ref 10–44)
ANION GAP SERPL CALC-SCNC: 10 MMOL/L (ref 8–16)
AST SERPL-CCNC: 33 U/L (ref 10–40)
BASOPHILS # BLD AUTO: 0.04 K/UL (ref 0–0.2)
BASOPHILS NFR BLD: 0.7 % (ref 0–1.9)
BILIRUB SERPL-MCNC: 0.4 MG/DL (ref 0.1–1)
BUN SERPL-MCNC: 10 MG/DL (ref 6–20)
CALCIUM SERPL-MCNC: 9.1 MG/DL (ref 8.7–10.5)
CHLORIDE SERPL-SCNC: 108 MMOL/L (ref 95–110)
CO2 SERPL-SCNC: 20 MMOL/L (ref 23–29)
CREAT SERPL-MCNC: 1 MG/DL (ref 0.5–1.4)
DIFFERENTIAL METHOD: ABNORMAL
EOSINOPHIL # BLD AUTO: 0.2 K/UL (ref 0–0.5)
EOSINOPHIL NFR BLD: 2.7 % (ref 0–8)
ERYTHROCYTE [DISTWIDTH] IN BLOOD BY AUTOMATED COUNT: 13.2 % (ref 11.5–14.5)
EST. GFR  (AFRICAN AMERICAN): >60 ML/MIN/1.73 M^2
EST. GFR  (NON AFRICAN AMERICAN): >60 ML/MIN/1.73 M^2
GLUCOSE SERPL-MCNC: 64 MG/DL (ref 70–110)
HCT VFR BLD AUTO: 44.5 % (ref 37–48.5)
HGB BLD-MCNC: 13.5 G/DL (ref 12–16)
IMM GRANULOCYTES # BLD AUTO: 0.04 K/UL (ref 0–0.04)
IMM GRANULOCYTES NFR BLD AUTO: 0.7 % (ref 0–0.5)
LYMPHOCYTES # BLD AUTO: 2.1 K/UL (ref 1–4.8)
LYMPHOCYTES NFR BLD: 37.1 % (ref 18–48)
MAGNESIUM SERPL-MCNC: 2 MG/DL (ref 1.6–2.6)
MCH RBC QN AUTO: 32.1 PG (ref 27–31)
MCHC RBC AUTO-ENTMCNC: 30.3 G/DL (ref 32–36)
MCV RBC AUTO: 106 FL (ref 82–98)
MONOCYTES # BLD AUTO: 0.3 K/UL (ref 0.3–1)
MONOCYTES NFR BLD: 6 % (ref 4–15)
NEUTROPHILS # BLD AUTO: 2.9 K/UL (ref 1.8–7.7)
NEUTROPHILS NFR BLD: 52.8 % (ref 38–73)
NRBC BLD-RTO: 0 /100 WBC
PHOSPHATE SERPL-MCNC: 3.5 MG/DL (ref 2.7–4.5)
PLATELET # BLD AUTO: 250 K/UL (ref 150–450)
PLATELET BLD QL SMEAR: ABNORMAL
PMV BLD AUTO: 10.3 FL (ref 9.2–12.9)
POTASSIUM SERPL-SCNC: 4.3 MMOL/L (ref 3.5–5.1)
PROT SERPL-MCNC: 7.3 G/DL (ref 6–8.4)
RBC # BLD AUTO: 4.2 M/UL (ref 4–5.4)
SODIUM SERPL-SCNC: 138 MMOL/L (ref 136–145)
WBC # BLD AUTO: 5.53 K/UL (ref 3.9–12.7)

## 2022-05-09 PROCEDURE — 85025 COMPLETE CBC W/AUTO DIFF WBC: CPT

## 2022-05-09 PROCEDURE — 83735 ASSAY OF MAGNESIUM: CPT

## 2022-05-09 PROCEDURE — 36415 COLL VENOUS BLD VENIPUNCTURE: CPT

## 2022-05-09 PROCEDURE — 99225 PR SUBSEQUENT OBSERVATION CARE,LEVEL II: ICD-10-PCS | Mod: ,,, | Performed by: HOSPITALIST

## 2022-05-09 PROCEDURE — 99225 PR SUBSEQUENT OBSERVATION CARE,LEVEL II: CPT | Mod: ,,, | Performed by: HOSPITALIST

## 2022-05-09 PROCEDURE — 25000003 PHARM REV CODE 250: Performed by: STUDENT IN AN ORGANIZED HEALTH CARE EDUCATION/TRAINING PROGRAM

## 2022-05-09 PROCEDURE — 25000003 PHARM REV CODE 250

## 2022-05-09 PROCEDURE — 80053 COMPREHEN METABOLIC PANEL: CPT

## 2022-05-09 PROCEDURE — 84100 ASSAY OF PHOSPHORUS: CPT

## 2022-05-09 PROCEDURE — 84630 ASSAY OF ZINC: CPT

## 2022-05-09 PROCEDURE — G0378 HOSPITAL OBSERVATION PER HR: HCPCS

## 2022-05-09 RX ORDER — DIPHENHYDRAMINE HCL 25 MG
25 CAPSULE ORAL EVERY 6 HOURS PRN
Qty: 30 CAPSULE | Refills: 0 | Status: ON HOLD | OUTPATIENT
Start: 2022-05-09 | End: 2022-06-01 | Stop reason: HOSPADM

## 2022-05-09 RX ORDER — CEPHALEXIN 500 MG/1
500 CAPSULE ORAL EVERY 12 HOURS
Status: DISCONTINUED | OUTPATIENT
Start: 2022-05-09 | End: 2022-05-09 | Stop reason: HOSPADM

## 2022-05-09 RX ORDER — DOXYCYCLINE HYCLATE 100 MG
100 TABLET ORAL EVERY 12 HOURS
Qty: 16 TABLET | Refills: 0 | Status: SHIPPED | OUTPATIENT
Start: 2022-05-09 | End: 2022-05-17

## 2022-05-09 RX ORDER — CEPHALEXIN 500 MG/1
500 CAPSULE ORAL EVERY 12 HOURS
Qty: 16 CAPSULE | Refills: 0 | Status: SHIPPED | OUTPATIENT
Start: 2022-05-09 | End: 2022-05-17

## 2022-05-09 RX ADMIN — DIPHENHYDRAMINE HYDROCHLORIDE 25 MG: 25 CAPSULE ORAL at 09:05

## 2022-05-09 RX ADMIN — DOXYCYCLINE HYCLATE 100 MG: 100 TABLET, COATED ORAL at 09:05

## 2022-05-09 RX ADMIN — FAMOTIDINE 20 MG: 20 TABLET ORAL at 09:05

## 2022-05-09 RX ADMIN — PHENYTOIN SODIUM 100 MG: 100 CAPSULE ORAL at 09:05

## 2022-05-09 RX ADMIN — PHENYTOIN SODIUM 100 MG: 100 CAPSULE ORAL at 03:05

## 2022-05-09 RX ADMIN — ESCITALOPRAM OXALATE 20 MG: 20 TABLET ORAL at 07:05

## 2022-05-09 NOTE — PLAN OF CARE
Problem: Adult Inpatient Plan of Care  Goal: Plan of Care Review  Outcome: Ongoing, Progressing  Goal: Patient-Specific Goal (Individualized)  Outcome: Ongoing, Progressing  Goal: Absence of Hospital-Acquired Illness or Injury  Outcome: Ongoing, Progressing  Goal: Optimal Comfort and Wellbeing  Outcome: Ongoing, Progressing  Goal: Readiness for Transition of Care  Outcome: Ongoing, Progressing     Problem: Impaired Wound Healing  Goal: Optimal Wound Healing  Outcome: Ongoing, Progressing     Problem: Skin or Soft Tissue Infection  Goal: Absence of Infection Signs and Symptoms  Outcome: Ongoing, Progressing

## 2022-05-09 NOTE — PLAN OF CARE
[facility] - Telemetry Stepdown ([facility])  Discharge Assessment    Primary Care Provider: Sofi Kennedy MD     Discharge Assessment (most recent)     BRIEF DISCHARGE ASSESSMENT - 05/09/22 1258        Discharge Planning    Assessment Type Discharge Planning Brief Assessment     Resource/Environmental Concerns none     Support Systems Family members   Roger Baca [phone] and Nephaleshia- Alisa Baca [phone]    Assistance Needed None     Equipment Currently Used at Home none     Current Living Arrangements home/apartment/condo   Pt lives with boyfriend    Care Facility Name N/A     Patient/Family Anticipates Transition to home with family     Patient/Family Anticipated Services at Transition none     DME Needed Upon Discharge  none     Discharge Plan A Home with family     Discharge Plan B Home with family                   SW spoke with patient in [id number] for Discharge Planning Assessment. Per patient, Patient lives with boyfroend in a single family home on a slab foundation with four steps to porch and point of entry.  Patient was independent with ADLS and DID NOT use DME or in-home assistive equipment. Patientis not on dialysis  or coumadin,  takes medications as prescribed / keeps refilled / has resources for all daily and prescriptive needs. Preferred pharmacy is [facility] - Agreeable to bedside delivery.  Will have help from  Roger Baca [phone] and Nephew- Alisa Phuong [phone] and other immediate family upon discharge -  Family to provide transportation home.  All questions addressed. Unit and SW direct numbers provided. Will continue to follow for course of hospitalization    5/7/2022 10:04 AM  Chest pain [R07.9]  Sepsis [A41.9]  Cellulitis, unspecified cellulitis site [L03.90]    PCP: Sofi Kennedy MD    PHARMACY: No Pharmacies Listed    Payor: MEDICAID / Plan: [facility] COMMUNITY PLAN [facility] ([place] MEDICAID) / Product Type: Managed Medicaid /       Denisse  GILL Dickson  PRN - [facility]  [id number]

## 2022-05-09 NOTE — DISCHARGE SUMMARY
[facility] - Telemetry Stepdown ([place] [address])  [facility] Hospital Medicine  Discharge Summary      Patient Name: Nicole Collins  MRN: [id number]  Patient Class: OP- Observation  Admission Date: 5/7/2022  Hospital Length of Stay: 0 days  Discharge Date and Time:  05/09/2022 4:01 PM  Attending Physician: Adria Johnson MD   Discharging Provider: Neftaly Lepe MD  Primary Care Provider: Sofi Kennedy MD  Hospital Medicine Team: [facility] HOSP MED 3 Neftaly Lepe MD    HPI:   Nicole Collins is a 42 y/o F with PMH of HTN, bipolar disorder, seizures [facility]. She initially presented this morning, then left AMA because she had to take her children home, and then returned. She reports that the rash began about two weeks ago after changing her laundry detergent and body wash. She was seen in the ED and discharged with prednisone for whole body allergic dermatitis. She reports that the rash on her upper extremities is improving, but the rash on her lower extremities is worsening. She reports generalized itching and RLE pain. [place] who were missing, and has had her legs in the water, most recently on Thursday. She denies fevers, chills, nausea, vomiting, diarrhea.     In the ED, she was afebrile, /67, HR 86, O2 sat 100% on RA. Labs notable for CRP 10.3, lactate 2.8. WBC and ESR wnl. CT bilateral tib fib at WB showed extensive soft tissue swelling without abscess or subcutaneous air. In the ED, she received a dose of ciprofloxacin, clindamycin, doxycycline, and zosyn. 1L LR bolus, morphine 4 mg given. She also received a dose of vanc and zosyn during her initial ED visit today. She was evaluated by surgery, who recommend broad spectrum antibiotics for cellulitis. Per surgery, the rash is not nec fasc. Admitted to hospital medicine for further management.       * No surgery found *      Hospital Course:   Admitted  for management of cellulitis. Abx continued with ceftriaxone and doxycycline for vibrio coverage. Gen Surg and dermatology consulted. Per gen surg, no evidence of necrotizing fasciitis so no indication for surgery, medical management only. Dermatology evaluated patient, state lesions likely atopic dermatitis with concomitant contact dermatitis, as well as possible component of cellulitis. Derm recommending triamcinolone cream and supportive skin care regimen. On 5/9 patient sxs much improved with PO benadryl and on abx. Pt transitioned from IV ctx to PO keflex and continued on doxy for 10-day course of abx. Pt stable for discharge on abx, steroid cream and with follow-up in dermatology clinic at [facility].        Wt Readings from Last 3 Encounters:   05/07/22 94.3 kg (208 lb)   05/07/22 86.2 kg (190 lb)   03/28/22 76.2 kg (168 lb)     Temp Readings from Last 3 Encounters:   05/09/22 98 °F (36.7 °C) (Oral)   05/07/22 98.3 °F (36.8 °C) (Oral)   03/28/22 98.1 °F (36.7 °C) (Oral)     BP Readings from Last 3 Encounters:   05/09/22 (!) 162/77   05/07/22 (!) 175/109   03/28/22 (!) 138/93     Pulse Readings from Last 3 Encounters:   05/09/22 87   05/07/22 96   03/28/22 78       Physical Exam  Constitutional:       General: She is not in acute distress.  HENT:      Head: Normocephalic and atraumatic.      Nose: Nose normal.   Eyes:      Extraocular Movements: Extraocular movements intact.      Conjunctiva/sclera: Conjunctivae normal.   Cardiovascular:      Rate and Rhythm: Normal rate and regular rhythm.      Heart sounds: Normal heart sounds. No murmur heard.  Pulmonary:      Effort: Pulmonary effort is normal. No respiratory distress.      Breath sounds: Normal breath sounds. No wheezing or rhonchi.   Abdominal:      General: Bowel sounds are normal.      Palpations: Abdomen is soft.      Tenderness: There is no abdominal tenderness.   Musculoskeletal:         General: Swelling and tenderness (RLE) present.      Cervical back:  Normal range of motion.   Skin:     General: Skin is warm and dry.      Findings: Rash (See photos in ED note. Erythematous rash on BL LE. Tenderness to light touch throughout RLE. Dry, scaly rash on UE, abdomen, and back, less severe than LE.) present. Overall appears improved over yesterday.   Neurological:      General: No focal deficit present.      Mental Status: She is alert and oriented to person, place, and time.   Psychiatric:         Mood and Affect: Mood normal.         Behavior: Behavior normal.     Goals of Care Treatment Preferences:  Code Status: Full Code      Consults:   Consults (From admission, onward)        Status Ordering Provider     Inpatient consult to Dermatology  Once        Provider:  (Not yet assigned)    Completed DEVAN LIMA     Inpatient consult to General surgery  Once        Provider:  (Not yet assigned)    Completed JAYLIN GALLAGHER          No new Assessment & Plan notes have been filed under this hospital service since the last note was generated.  Service: Hospital Medicine    Final Active Diagnoses:    Diagnosis Date Noted POA    PRINCIPAL PROBLEM:  Cellulitis [L03.90] 05/07/2022 Yes    Eczematous dermatitis [L30.9] 05/09/2022 Yes    HTN (hypertension) [I10] 05/07/2022 Yes    Bipolar 1 disorder [F31.9] 05/07/2022 Yes    Seizure [R56.9] 10/24/2017 Yes      Problems Resolved During this Admission:       Discharged Condition: stable    Disposition: Home or Self Care    Follow Up: Outpatient Dermatology Clinic at [facility]    Patient Instructions:   No discharge procedures on file.      Pending Diagnostic Studies:     Procedure Component Value Units Date/Time    HIV 1/2 Ag/Ab (4th Gen) [[id number]] Collected: 05/07/22 1509    Order Status: Sent Lab Status: In process Updated: 05/07/22 1515    Specimen: Blood     Hepatitis C Antibody [[id number]] Collected: 05/07/22 1509    Order Status: Sent Lab Status: In process Updated: 05/07/22 1515    Specimen: Blood     Zinc [[id number]]  Collected: 05/09/22 1137    Order Status: Sent Lab Status: In process Updated: 05/09/22 1142    Specimen: Blood          Medications:  Reconciled Home Medications:      Medication List      START taking these medications    cephALEXin 500 MG capsule  Commonly known as: KEFLEX  Take 1 capsule (500 mg total) by mouth every 12 (twelve) hours. for 8 days     diphenhydrAMINE 25 mg capsule  Commonly known as: BENADRYL  Take 1 capsule (25 mg total) by mouth every 6 (six) hours as needed for Itching.     doxycycline 100 MG tablet  Commonly known as: VIBRA-TABS  Take 1 tablet (100 mg total) by mouth every 12 (twelve) hours. for 8 days        CHANGE how you take these medications    amLODIPine 10 MG tablet  Commonly known as: NORVASC  Take 1 tablet (10 mg total) by mouth once daily.  What changed: Another medication with the same name was removed. Continue taking this medication, and follow the directions you see here.     QUEtiapine 50 MG tablet  Commonly known as: SEROQUEL  Take 400 mg by mouth every evening.  What changed: Another medication with the same name was removed. Continue taking this medication, and follow the directions you see here.        CONTINUE taking these medications    acetaminophen 325 MG tablet  Commonly known as: TYLENOL  Take 2 tablets (650 mg total) by mouth every 6 (six) hours as needed for Pain (or fever).     albuterol sulfate 2.5 mg/0.5 mL Nebu  Take 2.5 mg by nebulization every 4 (four) hours as needed (cough and wheezing). Rescue     ALPRAZolam 1 MG tablet  Commonly known as: XANAX  Take 1 mg by mouth.     cetirizine 10 MG tablet  Commonly known as: ZYRTEC  Take 1 tablet (10 mg total) by mouth once daily.     dextroamphetamine-amphetamine 30 mg Tab  Take 1 tablet by mouth 2 (two) times daily.     EScitalopram oxalate 20 MG tablet  Commonly known as: LEXAPRO  Take 20 mg by mouth every morning.     famotidine 20 MG tablet  Commonly known as: PEPCID  Take 1 tablet (20 mg total) by mouth 2 (two)  times daily.     hydroCHLOROthiazide 25 MG tablet  Commonly known as: HYDRODIURIL  Take 1 tablet (25 mg total) by mouth once daily.     ibuprofen 600 MG tablet  Commonly known as: ADVIL,MOTRIN  Take 1 tablet (600 mg total) by mouth every 6 (six) hours as needed for Pain.     meloxicam 15 MG tablet  Commonly known as: MOBIC  Take 15 mg by mouth once daily.     menthol 3.2 mg Lozg  by Mucous Membrane route.     methocarbamoL 500 MG Tab  Commonly known as: ROBAXIN  Take 500 mg by mouth 4 (four) times daily.     naloxone 4 mg/actuation Spry  Commonly known as: NARCAN  4mg by nasal route as needed for opioid overdose; may repeat every 2-3 minutes in alternating nostrils until medical help arrives. Call 911     ondansetron 4 MG Tbdl  Commonly known as: ZOFRAN-ODT  Take 1 tablet (4 mg total) by mouth every 8 (eight) hours as needed.     phenytoin 100 MG ER capsule  Commonly known as: DILANTIN  Take 1 capsule (100 mg total) by mouth 3 (three) times daily.     promethazine 25 MG tablet  Commonly known as: PHENERGAN  Take 1 tablet (25 mg total) by mouth every 6 (six) hours as needed for Nausea.        STOP taking these medications    clindamycin 150 MG capsule  Commonly known as: CLEOCIN     cloNIDine 0.1 MG tablet  Commonly known as: CATAPRES     levoFLOXacin 750 MG tablet  Commonly known as: LEVAQUIN     losartan-hydrochlorothiazide 100-12.5 mg 100-12.5 mg Tab  Commonly known as: HYZAAR     metroNIDAZOLE 500 MG tablet  Commonly known as: FLAGYL            Indwelling Lines/Drains at time of discharge:   Lines/Drains/Airways     None                 Time spent on the discharge of patient: 45 minutes         Neftaly Lepe MD   Internal Medicine PGY-1     Department of Hospital Medicine  [facility] - Telemetry Stepdown ([place]-7)

## 2022-05-09 NOTE — CONSULTS
[facility] - Telemetry Stepdown ([place] [address])  Dermatology  Consult Note    Patient Name: Nicole Collins  MRN: [id number]  Admission Date: 2022  Hospital Length of Stay: 0 days  Attending Physician: Adria Johnson MD  Primary Care Provider: Sofi Kennedy MD     Consults  Subjective:     Principal Problem:Cellulitis    HPI:  40 yo F w/ PMHx atopic dermatitis, bipolar 1 disorder, seizure disorder, HTN who is consulted to dermatology for rash. Patient reports about 2 weeks ago, she noticed red scaly pruritic areas to the lower extremities. Over the course of the next weeks, the progressed to involve the back and arms. She endorses the last time something similar happened was on her upper extremities about 1 month ago that improved with eucerin cream and benadryl. With her today, she has eucerin and vaseline that she is currently using with improvement. She reports recently losing her children in the [facility] and has been walking in the water looking for them. Today, patient reports improvement in lesions on her body with improved pruritus. Patient does report recently changing her detergent to tide. Using soap from bath and body works to wash her body.     Per chart review, patient has been seen in the ED 2-3x in the past year for what appears to be allergic contact dermatitis vs other treated with prednisone and benadryl. She reports improvement in skin lesions with the previous treatments. She has never had patch testing or something similar before.       Past Medical History:   Diagnosis Date    Bipolar 1 disorder     Cocaine abuse     DM mellitus, gestational     ETOH abuse     Hypertension     Methamphetamine abuse     Opiate overdose     Seizures     Trichomonas infection        Past Surgical History:   Procedure Laterality Date     SECTION, LOW TRANSVERSE      x 3    ECTOPIC PREGNANCY SURGERY      KNEE SURGERY  2010    left knee    SALPINGECTOMY      THYROID SURGERY       "TUBAL LIGATION       Family History       Problem Relation (Age of Onset)    Cancer Mother, Father, Maternal Grandmother, Maternal Grandfather    Heart disease Mother          Tobacco Use    Smoking status: Current Some Day Smoker     Types: Cigars, Cigarettes    Smokeless tobacco: Never Used    Tobacco comment: Patient states she smokes 1 cigar per week.   Substance and Sexual Activity    Alcohol use: Yes     Comment: binge drinker    Drug use: Yes     Types: Marijuana, "Crack" cocaine, MDMA (Ecstacy), Amphetamines, Cocaine, Methamphetamines, Heroin    Sexual activity: Not Currently       Review of patient's allergies indicates:   Allergen Reactions    Tangerine Anaphylaxis    Sweet orange(citrus sinensis) Hives    Oranges [orange]        Medications:  Continuous Infusions:  Scheduled Meds:   cefTRIAXone (ROCEPHIN) IVPB  1 g Intravenous Q24H    doxycycline  100 mg Oral Q12H    enoxaparin  40 mg Subcutaneous Daily    EScitalopram oxalate  20 mg Oral QAM    famotidine  20 mg Oral BID    phenytoin  100 mg Oral TID    QUEtiapine  300 mg Oral Nightly     PRN Meds:albuterol sulfate, ALPRAZolam, dextrose 10%, dextrose 10%, diphenhydrAMINE, glucagon (human recombinant), glucose, glucose, ondansetron, sodium chloride 0.9%    Review of Systems   All other systems reviewed and are negative.  Objective:     Vital Signs (Most Recent):  Temp: 98.2 °F (36.8 °C) (05/09/22 0816)  Pulse: 80 (05/09/22 0816)  Resp: 16 (05/09/22 0816)  BP: 119/70 (05/09/22 0816)  SpO2: 100 % (05/09/22 0816) Vital Signs (24h Range):  Temp:  [97.5 °F (36.4 °C)-98.2 °F (36.8 °C)] 98.2 °F (36.8 °C)  Pulse:  [77-92] 80  Resp:  [16-18] 16  SpO2:  [92 %-100 %] 100 %  BP: ()/(52-80) 119/70     Weight: 94.3 kg (208 lb)  Body mass index is 33.59 kg/m².    Physical Exam   Constitutional: She appears well-developed and well-nourished.   HENT:   Mouth/Throat: Gums normal.  Lips normal.    Eyes: Lids are normal.  No conjunctival no injection. "   Cardiovascular:  There is local extremity swelling.             Neurological: She is alert and oriented to person, place, and time.   Psychiatric: She has a normal mood and affect.   Skin:   Areas Examined (abnormalities noted in diagram):   Head / Face Inspection Performed  Neck Inspection Performed  Chest / Axilla Inspection Performed  Abdomen Inspection Performed  Back Inspection Performed  RUE Inspected  LUE Inspection Performed  RLE Inspected  LLE Inspection Performed  Nails and Digits Inspection Performed  Eczematous plaques on the bilateral dorsal feet, lower extremities, upper extremities, and dorsal hands  Ill defined, hypopigmented macules and patches to the abdomen and back w/ fine scale  Hyperpigmented patches to the lower extremities and upper extremities  Erythematous, blanching patches to the trunk and extremities  Well defined, reticular hyperkeratotic border on the dorsal feet bilaterally.                              Significant Labs:   CBC w/ decreased H/H, elevated MCV  CMP wnl  Lactate normalized    Significant Imaging: I have reviewed all pertinent imaging results/findings within the past 24 hours.      Assessment/Plan:     Eczematous dermatitis  40 yo F w/ PMHx of atopic dermatitis who presents with eczematous plaques w/ blanching erythema on the trunk and extremities. Reports history of this occurring before most recently 1 month ago on the upper extremities resolved with dry skin care. Per chart review, has been seen in the ED several times for similar presentation. Labs reviewed and wnl. Patient currently improving on dry skin care alone and IV antibiotics for cellulitis. Patient does have a hx of seizure disorder and is on phenytoin but timeline and chronic use do not fit clinical picture for SJS/TEN or even DRESS. No other new medications. No involvement of the eyes, mouth, or other mucous membranes. Labs without eosinophilia, evidence end organ dysfunction. No facial involvement,  facial edema, LAD.   DDx: atopic dermatitis with high likelihood of concomitant contact dermatitis (eg dorsal foot involvement), consideration for Id reaction 2/2 a resolving cellulitis, dermatophyte infection, or robust contact dermatitis; KOH obtained and negative today. Recommend biopsy today to define disease state and r/o CTCL although history of AD and distribution of eczematous eruption more suggestive of AD. Discussed with patient including risks/benefits- she declined biopsy at this time.   - Recommend triamcinolone 0.1% ointment and/or cream (patient can choose which she wants) BID PRN rash at body. Avoid use of medication on face, body folds, groin/genitalia.  Please give 454g jar to patient and provide at bedside. Consideration for wet wrap therapy in the first 3-5 days to jumpstart improvement:  Wet wrap therapy: Apply thin layer of steroid ointment to all affected areas on body. Cover with warm, damp cotton PJs first then second layer of dry cotton PJs. Remove after 30 minutes to an hour. Re-apply thin layer of steroid ointment to all affected areas on body. Perform wet wrap therapy twice daily for 3-5 days then continue with topical steroid application alone to rash (without wet wrap therapy) until rash resolved.    - Counseled on gentle, dry skin care and avoidance of common allergens/irritants.   No more than 1 shower or bath a day.   Out of shower or bath in less than 10 minutes.   Use only warm water, NOT hot.   Soap only where needed - areas that make body odor or are visibly dirty.  Use gentle soaps only (eg, Cetaphil Body Wash).   After shower or bath, pat dry - do not scrub or rub aggressively.   Apply thick moisturizing cream or ointment following shower or bath and at least one more time each day (eg, Cerave, Cetaphil, Vanicream, Vaseline jelly). Moisturizers are to be applied AFTER topical steroids (ie the triamcinolone cream or ointment) if using for active rash; otherwise to be used  indefinitively for prevention of rash flares.   Avoid common allergens/irritants - fragrances, botanicals, etc. Switch to free and clear laundry detergent; avoid fabric softeners and dryer sheets.   - Recommend use of PO Benadryl 25 mg qhs or PO Atarax 10-30 mg qhs prn pruritus pending no contraindication or medication interaction.   - Recommend follow up with [facility] for continued care and possibly patch testing.        Thank you for your consult. I will sign off. Please contact us if you have any additional questions.    Prior to discharge, please inform myself so that I may schedule the patient for follow-up. She will need to be scheduled at [facility]    Delbert Holden MD  Dermatology  [facility] - Telemetry Stepdown ([place]-7)    I have reviewed and concur with the resident's history, physical, assessment, and plan as written and edited above.  I have been available to the resident and involved in the medical decision making at all times during the care of this patient.   Sherine Melendez MD   [facility]

## 2022-05-09 NOTE — NURSING
Patient had uneventful night, no acute events occurred. meds administered as scheduled. Pain meds administered as needed. Continue to monitor. Call light placed within reach.        0500 Patient declined am labs. Requested the phlebotomist to come back later.

## 2022-05-09 NOTE — SUBJECTIVE & OBJECTIVE
"Past Medical History:   Diagnosis Date    Bipolar 1 disorder     Cocaine abuse     DM mellitus, gestational     ETOH abuse     Hypertension     Methamphetamine abuse     Opiate overdose     Seizures     Trichomonas infection        Past Surgical History:   Procedure Laterality Date     SECTION, LOW TRANSVERSE      x 3    ECTOPIC PREGNANCY SURGERY      KNEE SURGERY  2010    left knee    SALPINGECTOMY      THYROID SURGERY      TUBAL LIGATION       Family History       Problem Relation (Age of Onset)    Cancer Mother, Father, Maternal Grandmother, Maternal Grandfather    Heart disease Mother          Tobacco Use    Smoking status: Current Some Day Smoker     Types: Cigars, Cigarettes    Smokeless tobacco: Never Used    Tobacco comment: Patient states she smokes 1 cigar per week.   Substance and Sexual Activity    Alcohol use: Yes     Comment: binge drinker    Drug use: Yes     Types: Marijuana, "Crack" cocaine, MDMA (Ecstacy), Amphetamines, Cocaine, Methamphetamines, Heroin    Sexual activity: Not Currently       Review of patient's allergies indicates:   Allergen Reactions    Tangerine Anaphylaxis    Sweet orange(citrus sinensis) Hives    Oranges [orange]        Medications:  Continuous Infusions:  Scheduled Meds:   cefTRIAXone (ROCEPHIN) IVPB  1 g Intravenous Q24H    doxycycline  100 mg Oral Q12H    enoxaparin  40 mg Subcutaneous Daily    EScitalopram oxalate  20 mg Oral QAM    famotidine  20 mg Oral BID    phenytoin  100 mg Oral TID    QUEtiapine  300 mg Oral Nightly     PRN Meds:albuterol sulfate, ALPRAZolam, dextrose 10%, dextrose 10%, diphenhydrAMINE, glucagon (human recombinant), glucose, glucose, ondansetron, sodium chloride 0.9%    Review of Systems   All other systems reviewed and are negative.  Objective:     Vital Signs (Most Recent):  Temp: 98.2 °F (36.8 °C) (22)  Pulse: 80 (22)  Resp: 16 (22)  BP: 119/70 (22)  SpO2: 100 % (22) Vital Signs (24h " Range):  Temp:  [97.5 °F (36.4 °C)-98.2 °F (36.8 °C)] 98.2 °F (36.8 °C)  Pulse:  [77-92] 80  Resp:  [16-18] 16  SpO2:  [92 %-100 %] 100 %  BP: ()/(52-80) 119/70     Weight: 94.3 kg (208 lb)  Body mass index is 33.59 kg/m².    Physical Exam   Constitutional: She appears well-developed and well-nourished.   HENT:   Mouth/Throat: Gums normal.  Lips normal.    Eyes: Lids are normal.  No conjunctival no injection.   Cardiovascular:  There is local extremity swelling.             Neurological: She is alert and oriented to person, place, and time.   Psychiatric: She has a normal mood and affect.   Skin:   Areas Examined (abnormalities noted in diagram):   Head / Face Inspection Performed  Neck Inspection Performed  Chest / Axilla Inspection Performed  Abdomen Inspection Performed  Back Inspection Performed  RUE Inspected  LUE Inspection Performed  RLE Inspected  LLE Inspection Performed  Nails and Digits Inspection Performed  Eczematous plaques on the bilateral dorsal feet, lower extremities, upper extremities, and dorsal hands  Ill defined, hypopigmented macules and patches to the abdomen and back w/ fine scale  Hyperpigmented patches to the lower extremities and upper extremities  Erythematous, blanching patches to the trunk and extremities  Well defined, reticular hyperkeratotic border on the dorsal feet bilaterally.                              Significant Labs:   CBC w/ decreased H/H, elevated MCV  CMP wnl  Lactate normalized    Significant Imaging: I have reviewed all pertinent imaging results/findings within the past 24 hours.

## 2022-05-09 NOTE — PLAN OF CARE
[facility] - Telemetry Stepdown ([facility])  Discharge Final Note    Primary Care Provider: Sofi Kennedy MD    Expected Discharge Date: 5/9/2022    Final Discharge Note (most recent)     Final Note - 05/09/22 1624        Final Note    Assessment Type Final Discharge Note     Anticipated Discharge Disposition Home or Self Care     What phone number can be called within the next 1-3 days to see how you are doing after discharge? [phone]     Hospital Resources/Appts/Education Provided Appointments scheduled and added to AVS        Post-Acute Status    Post-Acute Authorization Other     Other Status No Post-Acute Service Needs     Discharge Delays None known at this time                 Important Message from Medicare           Patient medically ready for discharge to home. Any necessary transport setup by SW. This SW scheduled or requested necessary follow-up appointments. Family/patient aware of discharge.    No future appointments.      Denisse Dickson LMSW  PRN - [facility]  [phone]

## 2022-05-09 NOTE — PLAN OF CARE
05/09/22 1623   Post-Acute Status   Post-Acute Authorization Other   Other Status No Post-Acute Service Needs       Patient is being D/C today with no Social Service needs identified at this time.       Denisse Dickson LMSW  PRN - [facility]  [phone]

## 2022-05-09 NOTE — PLAN OF CARE
Patient is awake, alert and oriented x 4.  Patient is eager for discharge and remains free from falls.  Patient given prn Benadryl x 1 for itchy with relief.  Patient has no other complaints at this time.

## 2022-05-10 LAB
HCV AB SERPL QL IA: POSITIVE
HIV 1+2 AB+HIV1 P24 AG SERPL QL IA: NEGATIVE

## 2022-05-11 LAB
BACTERIA BLD CULT: NORMAL
BACTERIA BLD CULT: NORMAL

## 2022-05-12 LAB — ZINC SERPL-MCNC: 81 UG/DL (ref 60–130)

## 2022-05-16 DIAGNOSIS — R76.8 HEPATITIS C ANTIBODY POSITIVE IN BLOOD: Primary | ICD-10-CM

## 2022-05-23 ENCOUNTER — HOSPITAL ENCOUNTER (INPATIENT)
Facility: HOSPITAL | Age: 42
LOS: 9 days | Discharge: HOME | DRG: 607 | End: 2022-06-01
Attending: STUDENT IN AN ORGANIZED HEALTH CARE EDUCATION/TRAINING PROGRAM | Admitting: HOSPITALIST
Payer: MEDICAID

## 2022-05-23 DIAGNOSIS — L53.9 ERYTHRODERMA: ICD-10-CM

## 2022-05-23 DIAGNOSIS — M79.89 LEG SWELLING: ICD-10-CM

## 2022-05-23 DIAGNOSIS — R21 RASH OF BODY: Primary | ICD-10-CM

## 2022-05-23 DIAGNOSIS — B18.2 CHRONIC HEPATITIS C WITHOUT HEPATIC COMA: ICD-10-CM

## 2022-05-23 DIAGNOSIS — F32.A ANXIETY AND DEPRESSION: ICD-10-CM

## 2022-05-23 DIAGNOSIS — F41.9 ANXIETY AND DEPRESSION: ICD-10-CM

## 2022-05-23 DIAGNOSIS — K21.9 GASTROESOPHAGEAL REFLUX DISEASE WITHOUT ESOPHAGITIS: ICD-10-CM

## 2022-05-23 DIAGNOSIS — R07.9 CHEST PAIN: ICD-10-CM

## 2022-05-23 DIAGNOSIS — D75.89 MACROCYTOSIS: ICD-10-CM

## 2022-05-23 DIAGNOSIS — R56.9 SEIZURE: ICD-10-CM

## 2022-05-23 DIAGNOSIS — F31.9 BIPOLAR 1 DISORDER: ICD-10-CM

## 2022-05-23 LAB
ALBUMIN SERPL BCP-MCNC: 3.2 G/DL (ref 3.5–5.2)
ALP SERPL-CCNC: 57 U/L (ref 55–135)
ALT SERPL W/O P-5'-P-CCNC: 30 U/L (ref 10–44)
ANION GAP SERPL CALC-SCNC: 11 MMOL/L (ref 8–16)
AST SERPL-CCNC: 33 U/L (ref 10–40)
BASOPHILS # BLD AUTO: 0.02 K/UL (ref 0–0.2)
BASOPHILS NFR BLD: 0.4 % (ref 0–1.9)
BILIRUB SERPL-MCNC: 0.5 MG/DL (ref 0.1–1)
BUN SERPL-MCNC: 5 MG/DL (ref 6–20)
CALCIUM SERPL-MCNC: 8.6 MG/DL (ref 8.7–10.5)
CHLORIDE SERPL-SCNC: 110 MMOL/L (ref 95–110)
CO2 SERPL-SCNC: 22 MMOL/L (ref 23–29)
CREAT SERPL-MCNC: 0.8 MG/DL (ref 0.5–1.4)
DIFFERENTIAL METHOD: ABNORMAL
EOSINOPHIL # BLD AUTO: 0.1 K/UL (ref 0–0.5)
EOSINOPHIL NFR BLD: 2 % (ref 0–8)
ERYTHROCYTE [DISTWIDTH] IN BLOOD BY AUTOMATED COUNT: 13.1 % (ref 11.5–14.5)
ERYTHROCYTE [SEDIMENTATION RATE] IN BLOOD BY WESTERGREN METHOD: 14 MM/HR (ref 0–20)
EST. GFR  (AFRICAN AMERICAN): >60 ML/MIN/1.73 M^2
EST. GFR  (NON AFRICAN AMERICAN): >60 ML/MIN/1.73 M^2
GLUCOSE SERPL-MCNC: 85 MG/DL (ref 70–110)
HCT VFR BLD AUTO: 42.1 % (ref 37–48.5)
HGB BLD-MCNC: 12.6 G/DL (ref 12–16)
IMM GRANULOCYTES # BLD AUTO: 0.01 K/UL (ref 0–0.04)
IMM GRANULOCYTES NFR BLD AUTO: 0.2 % (ref 0–0.5)
LYMPHOCYTES # BLD AUTO: 1.9 K/UL (ref 1–4.8)
LYMPHOCYTES NFR BLD: 38 % (ref 18–48)
MCH RBC QN AUTO: 32.8 PG (ref 27–31)
MCHC RBC AUTO-ENTMCNC: 29.9 G/DL (ref 32–36)
MCV RBC AUTO: 110 FL (ref 82–98)
MONOCYTES # BLD AUTO: 0.6 K/UL (ref 0.3–1)
MONOCYTES NFR BLD: 11.7 % (ref 4–15)
NEUTROPHILS # BLD AUTO: 2.4 K/UL (ref 1.8–7.7)
NEUTROPHILS NFR BLD: 47.7 % (ref 38–73)
NRBC BLD-RTO: 0 /100 WBC
PLATELET # BLD AUTO: 49 K/UL (ref 150–450)
PMV BLD AUTO: 11.2 FL (ref 9.2–12.9)
POTASSIUM SERPL-SCNC: 3.2 MMOL/L (ref 3.5–5.1)
PROT SERPL-MCNC: 6.7 G/DL (ref 6–8.4)
RBC # BLD AUTO: 3.84 M/UL (ref 4–5.4)
SODIUM SERPL-SCNC: 143 MMOL/L (ref 136–145)
WBC # BLD AUTO: 4.95 K/UL (ref 3.9–12.7)

## 2022-05-23 PROCEDURE — 81025 URINE PREGNANCY TEST: CPT | Performed by: HOSPITALIST

## 2022-05-23 PROCEDURE — 85025 COMPLETE CBC W/AUTO DIFF WBC: CPT | Performed by: STUDENT IN AN ORGANIZED HEALTH CARE EDUCATION/TRAINING PROGRAM

## 2022-05-23 PROCEDURE — 63600175 PHARM REV CODE 636 W HCPCS: Performed by: STUDENT IN AN ORGANIZED HEALTH CARE EDUCATION/TRAINING PROGRAM

## 2022-05-23 PROCEDURE — 80053 COMPREHEN METABOLIC PANEL: CPT | Performed by: STUDENT IN AN ORGANIZED HEALTH CARE EDUCATION/TRAINING PROGRAM

## 2022-05-23 PROCEDURE — 99292 CRITICAL CARE ADDL 30 MIN: CPT | Mod: 25

## 2022-05-23 PROCEDURE — 25000003 PHARM REV CODE 250: Performed by: STUDENT IN AN ORGANIZED HEALTH CARE EDUCATION/TRAINING PROGRAM

## 2022-05-23 PROCEDURE — 96374 THER/PROPH/DIAG INJ IV PUSH: CPT

## 2022-05-23 PROCEDURE — 85652 RBC SED RATE AUTOMATED: CPT | Performed by: STUDENT IN AN ORGANIZED HEALTH CARE EDUCATION/TRAINING PROGRAM

## 2022-05-23 PROCEDURE — 96375 TX/PRO/DX INJ NEW DRUG ADDON: CPT

## 2022-05-23 PROCEDURE — 99291 CRITICAL CARE FIRST HOUR: CPT | Mod: 25

## 2022-05-23 PROCEDURE — 12000002 HC ACUTE/MED SURGE SEMI-PRIVATE ROOM

## 2022-05-23 RX ORDER — DIPHENHYDRAMINE HYDROCHLORIDE 50 MG/ML
12.5 INJECTION INTRAMUSCULAR; INTRAVENOUS
Status: COMPLETED | OUTPATIENT
Start: 2022-05-23 | End: 2022-05-23

## 2022-05-23 RX ORDER — MORPHINE SULFATE 4 MG/ML
4 INJECTION, SOLUTION INTRAMUSCULAR; INTRAVENOUS
Status: COMPLETED | OUTPATIENT
Start: 2022-05-23 | End: 2022-05-23

## 2022-05-23 RX ORDER — METHYLPREDNISOLONE SOD SUCC 125 MG
125 VIAL (EA) INJECTION
Status: DISCONTINUED | OUTPATIENT
Start: 2022-05-23 | End: 2022-05-23

## 2022-05-23 RX ORDER — HYDROXYZINE PAMOATE 25 MG/1
25 CAPSULE ORAL
Status: COMPLETED | OUTPATIENT
Start: 2022-05-23 | End: 2022-05-24

## 2022-05-23 RX ORDER — PREDNISONE 20 MG/1
60 TABLET ORAL
Status: COMPLETED | OUTPATIENT
Start: 2022-05-23 | End: 2022-05-24

## 2022-05-23 RX ADMIN — POTASSIUM BICARBONATE 20 MEQ: 391 TABLET, EFFERVESCENT ORAL at 08:05

## 2022-05-23 RX ADMIN — DIPHENHYDRAMINE HYDROCHLORIDE 12.5 MG: 50 INJECTION, SOLUTION INTRAMUSCULAR; INTRAVENOUS at 08:05

## 2022-05-23 RX ADMIN — MORPHINE SULFATE 4 MG: 4 INJECTION INTRAVENOUS at 08:05

## 2022-05-23 NOTE — ED TRIAGE NOTES
Patient presents to ED from home with complaints of general body swelling and redness x 3 weeks. Per pt, she has been [place] missing [place] Pt states her skin has significantly worsened and become more reddened, tight, tender, and swollen. Pt states she has been rubbing vaseline on her skin to help alleviate her symptoms.

## 2022-05-24 PROBLEM — R21 RASH OF BODY: Status: ACTIVE | Noted: 2022-05-24

## 2022-05-24 PROBLEM — K21.9 GASTROESOPHAGEAL REFLUX DISEASE WITHOUT ESOPHAGITIS: Status: ACTIVE | Noted: 2022-05-24

## 2022-05-24 PROBLEM — E66.811 CLASS 1 OBESITY DUE TO EXCESS CALORIES WITH SERIOUS COMORBIDITY AND BODY MASS INDEX (BMI) OF 30.0 TO 30.9 IN ADULT: Status: ACTIVE | Noted: 2022-05-24

## 2022-05-24 PROBLEM — F41.9 ANXIETY AND DEPRESSION: Status: ACTIVE | Noted: 2022-05-24

## 2022-05-24 PROBLEM — E66.09 CLASS 1 OBESITY DUE TO EXCESS CALORIES WITH SERIOUS COMORBIDITY AND BODY MASS INDEX (BMI) OF 30.0 TO 30.9 IN ADULT: Status: ACTIVE | Noted: 2022-05-24

## 2022-05-24 PROBLEM — F32.A ANXIETY AND DEPRESSION: Status: ACTIVE | Noted: 2022-05-24

## 2022-05-24 LAB
B-HCG UR QL: NEGATIVE
CTP QC/QA: YES

## 2022-05-24 PROCEDURE — 21400001 HC TELEMETRY ROOM

## 2022-05-24 PROCEDURE — 25000003 PHARM REV CODE 250: Performed by: STUDENT IN AN ORGANIZED HEALTH CARE EDUCATION/TRAINING PROGRAM

## 2022-05-24 PROCEDURE — 25000003 PHARM REV CODE 250: Performed by: HOSPITALIST

## 2022-05-24 PROCEDURE — 63600175 PHARM REV CODE 636 W HCPCS: Performed by: STUDENT IN AN ORGANIZED HEALTH CARE EDUCATION/TRAINING PROGRAM

## 2022-05-24 PROCEDURE — 87522 HEPATITIS C REVRS TRNSCRPJ: CPT | Performed by: HOSPITALIST

## 2022-05-24 PROCEDURE — 36415 COLL VENOUS BLD VENIPUNCTURE: CPT | Performed by: HOSPITALIST

## 2022-05-24 RX ORDER — ACETAMINOPHEN 325 MG/1
650 TABLET ORAL EVERY 8 HOURS PRN
Status: DISCONTINUED | OUTPATIENT
Start: 2022-05-24 | End: 2022-06-01 | Stop reason: HOSPADM

## 2022-05-24 RX ORDER — ENOXAPARIN SODIUM 100 MG/ML
40 INJECTION SUBCUTANEOUS EVERY 24 HOURS
Status: DISCONTINUED | OUTPATIENT
Start: 2022-05-24 | End: 2022-05-24

## 2022-05-24 RX ORDER — DIPHENHYDRAMINE HCL 25 MG
25 CAPSULE ORAL EVERY 6 HOURS PRN
Status: DISCONTINUED | OUTPATIENT
Start: 2022-05-24 | End: 2022-05-26

## 2022-05-24 RX ORDER — SODIUM CHLORIDE 0.9 % (FLUSH) 0.9 %
10 SYRINGE (ML) INJECTION EVERY 12 HOURS PRN
Status: DISCONTINUED | OUTPATIENT
Start: 2022-05-24 | End: 2022-06-01 | Stop reason: HOSPADM

## 2022-05-24 RX ORDER — PHENYTOIN SODIUM 100 MG/1
100 CAPSULE, EXTENDED RELEASE ORAL 3 TIMES DAILY
Status: DISCONTINUED | OUTPATIENT
Start: 2022-05-24 | End: 2022-06-01 | Stop reason: HOSPADM

## 2022-05-24 RX ORDER — TALC
6 POWDER (GRAM) TOPICAL NIGHTLY PRN
Status: DISCONTINUED | OUTPATIENT
Start: 2022-05-24 | End: 2022-06-01 | Stop reason: HOSPADM

## 2022-05-24 RX ORDER — IPRATROPIUM BROMIDE AND ALBUTEROL SULFATE 2.5; .5 MG/3ML; MG/3ML
3 SOLUTION RESPIRATORY (INHALATION) EVERY 4 HOURS PRN
Status: DISCONTINUED | OUTPATIENT
Start: 2022-05-24 | End: 2022-06-01 | Stop reason: HOSPADM

## 2022-05-24 RX ORDER — FAMOTIDINE 20 MG/1
20 TABLET, FILM COATED ORAL 2 TIMES DAILY
Status: DISCONTINUED | OUTPATIENT
Start: 2022-05-24 | End: 2022-06-01 | Stop reason: HOSPADM

## 2022-05-24 RX ORDER — HYDROCODONE BITARTRATE AND ACETAMINOPHEN 5; 325 MG/1; MG/1
1 TABLET ORAL EVERY 6 HOURS PRN
Status: DISCONTINUED | OUTPATIENT
Start: 2022-05-24 | End: 2022-06-01 | Stop reason: HOSPADM

## 2022-05-24 RX ORDER — NALOXONE HCL 0.4 MG/ML
0.02 VIAL (ML) INJECTION
Status: DISCONTINUED | OUTPATIENT
Start: 2022-05-24 | End: 2022-06-01 | Stop reason: HOSPADM

## 2022-05-24 RX ORDER — AMOXICILLIN 250 MG
1 CAPSULE ORAL 2 TIMES DAILY
Status: DISCONTINUED | OUTPATIENT
Start: 2022-05-24 | End: 2022-06-01 | Stop reason: HOSPADM

## 2022-05-24 RX ORDER — TRIAMCINOLONE ACETONIDE 1 MG/G
OINTMENT TOPICAL 2 TIMES DAILY
Status: DISCONTINUED | OUTPATIENT
Start: 2022-05-24 | End: 2022-05-25

## 2022-05-24 RX ORDER — ACETAMINOPHEN 325 MG/1
650 TABLET ORAL EVERY 4 HOURS PRN
Status: DISCONTINUED | OUTPATIENT
Start: 2022-05-24 | End: 2022-06-01 | Stop reason: HOSPADM

## 2022-05-24 RX ORDER — QUETIAPINE FUMARATE 200 MG/1
400 TABLET, FILM COATED ORAL NIGHTLY
Status: DISCONTINUED | OUTPATIENT
Start: 2022-05-24 | End: 2022-06-01 | Stop reason: HOSPADM

## 2022-05-24 RX ORDER — GLUCAGON 1 MG
1 KIT INJECTION
Status: DISCONTINUED | OUTPATIENT
Start: 2022-05-24 | End: 2022-06-01 | Stop reason: HOSPADM

## 2022-05-24 RX ORDER — ALBUTEROL SULFATE 2.5 MG/.5ML
2.5 SOLUTION RESPIRATORY (INHALATION) EVERY 4 HOURS PRN
Status: DISCONTINUED | OUTPATIENT
Start: 2022-05-24 | End: 2022-05-24 | Stop reason: ALTCHOICE

## 2022-05-24 RX ORDER — MAG HYDROX/ALUMINUM HYD/SIMETH 200-200-20
30 SUSPENSION, ORAL (FINAL DOSE FORM) ORAL 4 TIMES DAILY PRN
Status: DISCONTINUED | OUTPATIENT
Start: 2022-05-24 | End: 2022-06-01 | Stop reason: HOSPADM

## 2022-05-24 RX ORDER — ALPRAZOLAM 1 MG/1
1 TABLET ORAL 3 TIMES DAILY PRN
Status: DISCONTINUED | OUTPATIENT
Start: 2022-05-24 | End: 2022-06-01 | Stop reason: HOSPADM

## 2022-05-24 RX ORDER — CETIRIZINE HYDROCHLORIDE 10 MG/1
10 TABLET ORAL DAILY
Status: DISCONTINUED | OUTPATIENT
Start: 2022-05-24 | End: 2022-06-01 | Stop reason: HOSPADM

## 2022-05-24 RX ORDER — IBUPROFEN 200 MG
16 TABLET ORAL
Status: DISCONTINUED | OUTPATIENT
Start: 2022-05-24 | End: 2022-06-01 | Stop reason: HOSPADM

## 2022-05-24 RX ORDER — IBUPROFEN 200 MG
24 TABLET ORAL
Status: DISCONTINUED | OUTPATIENT
Start: 2022-05-24 | End: 2022-06-01 | Stop reason: HOSPADM

## 2022-05-24 RX ORDER — HYDROCHLOROTHIAZIDE 12.5 MG/1
25 TABLET ORAL DAILY
Status: DISCONTINUED | OUTPATIENT
Start: 2022-05-24 | End: 2022-06-01 | Stop reason: HOSPADM

## 2022-05-24 RX ORDER — SIMETHICONE 80 MG
1 TABLET,CHEWABLE ORAL 4 TIMES DAILY PRN
Status: DISCONTINUED | OUTPATIENT
Start: 2022-05-24 | End: 2022-06-01 | Stop reason: HOSPADM

## 2022-05-24 RX ORDER — AMLODIPINE BESYLATE 10 MG/1
10 TABLET ORAL DAILY
Status: DISCONTINUED | OUTPATIENT
Start: 2022-05-24 | End: 2022-06-01 | Stop reason: HOSPADM

## 2022-05-24 RX ORDER — MORPHINE SULFATE 4 MG/ML
4 INJECTION, SOLUTION INTRAMUSCULAR; INTRAVENOUS EVERY 4 HOURS PRN
Status: DISCONTINUED | OUTPATIENT
Start: 2022-05-24 | End: 2022-06-01 | Stop reason: HOSPADM

## 2022-05-24 RX ORDER — ESCITALOPRAM OXALATE 10 MG/1
20 TABLET ORAL EVERY MORNING
Status: DISCONTINUED | OUTPATIENT
Start: 2022-05-24 | End: 2022-06-01 | Stop reason: HOSPADM

## 2022-05-24 RX ADMIN — TRIAMCINOLONE ACETONIDE: 1 OINTMENT TOPICAL at 09:05

## 2022-05-24 RX ADMIN — PHENYTOIN SODIUM 100 MG: 100 CAPSULE ORAL at 10:05

## 2022-05-24 RX ADMIN — HYDROCHLOROTHIAZIDE 25 MG: 12.5 TABLET ORAL at 10:05

## 2022-05-24 RX ADMIN — PHENYTOIN SODIUM 100 MG: 100 CAPSULE ORAL at 03:05

## 2022-05-24 RX ADMIN — SENNOSIDES AND DOCUSATE SODIUM 1 TABLET: 50; 8.6 TABLET ORAL at 09:05

## 2022-05-24 RX ADMIN — AMLODIPINE BESYLATE 10 MG: 10 TABLET ORAL at 10:05

## 2022-05-24 RX ADMIN — HYDROCODONE BITARTRATE AND ACETAMINOPHEN 1 TABLET: 5; 325 TABLET ORAL at 04:05

## 2022-05-24 RX ADMIN — FAMOTIDINE 20 MG: 20 TABLET ORAL at 10:05

## 2022-05-24 RX ADMIN — QUETIAPINE FUMARATE 400 MG: 200 TABLET ORAL at 09:05

## 2022-05-24 RX ADMIN — HYDROCODONE BITARTRATE AND ACETAMINOPHEN 1 TABLET: 5; 325 TABLET ORAL at 11:05

## 2022-05-24 RX ADMIN — HYDROXYZINE PAMOATE 25 MG: 25 CAPSULE ORAL at 12:05

## 2022-05-24 RX ADMIN — PREDNISONE 60 MG: 20 TABLET ORAL at 12:05

## 2022-05-24 RX ADMIN — CETIRIZINE HYDROCHLORIDE 10 MG: 10 TABLET, FILM COATED ORAL at 10:05

## 2022-05-24 RX ADMIN — QUETIAPINE FUMARATE 400 MG: 200 TABLET ORAL at 04:05

## 2022-05-24 RX ADMIN — PHENYTOIN SODIUM 100 MG: 100 CAPSULE ORAL at 09:05

## 2022-05-24 RX ADMIN — ALPRAZOLAM 1 MG: 0.5 TABLET ORAL at 10:05

## 2022-05-24 RX ADMIN — ESCITALOPRAM OXALATE 20 MG: 10 TABLET ORAL at 08:05

## 2022-05-24 RX ADMIN — FAMOTIDINE 20 MG: 20 TABLET ORAL at 09:05

## 2022-05-24 NOTE — PROVIDER TRANSFER
Outside Transfer Acceptance Note / Regional Referral Center    Referring facility: [facility] [place]   Referring provider: MARCIAL CHRISTENSEN  Accepting facility: [facility]  Accepting provider: TONE MCCOY  Reason for transfer:  HLOC  Transfer diagnosis: Rash   Transfer specialty requested: Dermatology  Transfer specialty notified: yes  Transfer level: NUMBER 1-5: 2  Bed type requested: Med-Surg  Isolation status: No active isolations   Admission class or status: OBS    Narrative     42 y/o F with PMH of HTN, bipolar disorder, and seizure disorder presented to [facility] ED today for worsening leg pain and swelling.    Of note, pt was hospitalized at [facility] 5/7- 5/9 for diffuse body rash after exposure to [facility].  At that time, Dermatology felt this was atopic dermatitis with concomitant contact dermatitis, and superimposed cellulitis.  She was discharged with steroid cream and PO Keflex and Doxy x 10 days, with which she has been adherent.  She has not been able to f/u with [facility] Dermatology.  No f/c, SOB, throat swelling, bleeding.    Vitals: Temp: 98.2 °F (36.8 °C) (05/23/22 1629)  Pulse: 104 (05/23/22 1629)  Resp: 18 (05/23/22 1629)  BP: (!) 137/91 (05/23/22 1629)  SpO2: 99 % (05/23/22 1629)    New thrombocytopenia with PLT 49  K 3.2    Rash looks more diffuse, erythematous now compared to prior pictures; images in Epic.  No oral or genital mucosal lesions, Nikolsky negative    Referring team requesting transfer given worsening of rash and unable to have close f/u.  Case discussed with Dermatology who is agreeable to consult, but unable to provide recs at this time.      Instructions      Bhargav Cook-  Admit to Hospital Medicine  Upon patient arrival to floor, please send SecureChat to [facility] or call extension [phone] (if no answer, this will flip to a beeper, so enter your call back number) for Hospital Medicine admit team assignment and for additional admit orders  for the patient.  Do not page the attending physician associated with the patient on arrival (this physician may not be on duty at the time of arrival).  Rather, always call [phone] to reach the triage physician for orders and team assignment.

## 2022-05-24 NOTE — ASSESSMENT & PLAN NOTE
BP currently 130s/90s. BP likely elevated secondary to underlying pain component in addition to missed home dose of antihypertensives as BP usually well controlled per patient with home medications.  Plan:  -Optimize pain control   -Continue home medications, titrate as needed   -Monitor BP  -Low salt/cardiac diet  -IV hydralazine prn for SBP>160 or DBP>90

## 2022-05-24 NOTE — ASSESSMENT & PLAN NOTE
Patient has reported history of substance abuse but denies use currently. Patient without signs/symptoms of withdrawals and was educated on morbidity and mortality associated with continued usage.   Plan:  -monitor for signs/symptoms of withdrawals, treat accordingly

## 2022-05-24 NOTE — CHAPLAIN
1640: I attempted to visit with the patient but she was too sleepy.      Connor Pickard, Staff   [phone]

## 2022-05-24 NOTE — PLAN OF CARE
[facility] - Telemetry  Initial Discharge Assessment       Primary Care Provider: Sofi Kennedy MD    Admission Diagnosis: Leg swelling [M79.89]  Chest pain [R07.9]  Rash of body [R21]    Admission Date: 5/23/2022  Expected Discharge Date:     Discharge Barriers Identified: None    Payor: MEDICAID / Plan: [facility] COMMUNITY PLAN [facility] ([place] MEDICAID) / Product Type: Managed Medicaid /     Extended Emergency Contact Information  Primary Emergency Contact: RADAMES SAN  Mobile Phone: [phone]  Relation: Relative  Preferred language: English   needed? No  Secondary Emergency Contact: BRODY SAN  Mobile Phone: [phone]  Relation: Relative  Preferred language: English   needed? No    Discharge Plan A: Home with family  Discharge Plan B: Home with family, Other (TBD)    No Pharmacies Listed    Initial Assessment (most recent)       Adult Discharge Assessment - 05/24/22 1253          Discharge Assessment    Assessment Type Discharge Planning Assessment     Confirmed/corrected address, phone number and insurance Yes     Confirmed Demographics Correct on Facesheet     Source of Information patient     If unable to respond/provide information was family/caregiver contacted? No Contact Information Available     When was your last doctors appointment? --   Patient stated is been over 6 months.    Communicated CHRISTOPHER with patient/caregiver Date not available/Unable to determine     Reason For Admission Leg Swelling     Lives With child(mayco), dependent     Facility Arrived From: Home     Do you expect to return to your current living situation? Yes     Do you have help at home or someone to help you manage your care at home? Yes     Who are your caregiver(s) and their phone number(s)? Radames San (relative) [phone]     Prior to hospitilization cognitive status: Alert/Oriented     Current cognitive status: Alert/Oriented     Equipment Currently Used at Home none     Readmission within 30 days? No      Patient currently being followed by outpatient case management? No     Do you currently have service(s) that help you manage your care at home? No     Do you take prescription medications? Yes     Do you have prescription coverage? Yes     Coverage [employer]     Do you have any problems affording any of your prescribed medications? No     Is the patient taking medications as prescribed? yes     Who is going to help you get home at discharge? Radames Baca (relative) [phone]     How do you get to doctors appointments? family or friend will provide;car, drives self     Are you on dialysis? No     Do you take coumadin? No     Discharge Plan A Home with family     Discharge Plan B Home with family;Other   TBD    DME Needed Upon Discharge  other (see comments)   TBD    Discharge Plan discussed with: Patient     Discharge Barriers Identified None        Relationship/Environment    Name(s) of Who Lives With Patient Radames Baca (relative) [phone]                   Patient stated she does not have a preference for appointments.

## 2022-05-24 NOTE — HPI
Nicole Collins is a 41 y.o. female with a PMH  has a past medical history of Asthma, Bipolar 1 disorder, Cocaine abuse, DM mellitus, gestational, ETOH abuse, Hypertension, Methamphetamine abuse, Opiate overdose, Seizures, and Trichomonas infection. who presented to the ED complaining of worsening swelling, erythema, and itching of her lower extremities.  Patient initially presented to the ED earlier this month for similar complaints and was transferred to [facility] at which point she was diagnosed with atopic dermatitis, contact dermatitis, with underlying component of cellulitis.  Patient was evaluated by Dermatology who recommended triamcinolone cream, p.o. antibiotics keflex and doxycycline, steroid cream, p.o. Benadryl, extensive skin care regimen, and outpatient follow-up with Dermatology Clinic at [facility].  Since being discharge back on 05/9/2022, patient reported that her rash on her back, torso, and upper extremities have improved but has had worsening swelling, erythema, and persistent itching throughout her extremities with lower being greater than upper.  Patient reported completion of antibiotics in addition to compliance with skin care regimen set forth by Dermatology but continues to endorse symptoms as noted above. She reports she has not yet followed up with Dermatology outpatient as directed and has had no relief from over the counter medications.  She denied endorse any fever, chills, sweats, nausea, vomiting, chest pain, shortness of breath, abdominal pain, changes in bowel/bladder habits, or neurological deficits and reported all other review of systems negative except those noted above.  ED staff attempted to have patient transferred to [facility] to be re-evaluated by Dermatology but is currently on diversion is no pets are available.  Patient being admitted to Hospital Medicine for observation and continue medical management while awaiting transfer in the morning to [facility] for  high-level dermatological care.    PCP: Sofi Kennedy

## 2022-05-24 NOTE — ASSESSMENT & PLAN NOTE
Chronic. Stable. Not in acute exacerbation and currently denies endorsing any suicidal/homicidal ideations.   Plan:  -Continue home medications

## 2022-05-24 NOTE — ED NOTES
Towels and wash placed at bedside per pt request. Also given moisturizer. Reports her skin is dry.

## 2022-05-24 NOTE — H&P
[facility] Medicine  History & Physical    Patient Name: Nicole Colilns  MRN: [id number]  Patient Class: IP- Inpatient  Admission Date: 5/23/2022  Attending Physician: Chan Coburn MD   Primary Care Provider: Sofi Kennedy MD         Patient information was obtained from patient and ER records.     Subjective:     Principal Problem:Eczematous dermatitis    Chief Complaint:   Chief Complaint   Patient presents with    Leg Swelling     Pt states that she was seen here two weeks ago for pedal edema and took all medication but arms and legs are still swelling with SOB.         HPI: Nicole Collins is a 41 y.o. female with a PMH  has a past medical history of Asthma, Bipolar 1 disorder, Cocaine abuse, DM mellitus, gestational, ETOH abuse, Hypertension, Methamphetamine abuse, Opiate overdose, Seizures, and Trichomonas infection. who presented to the ED complaining of worsening swelling, erythema, and itching of her lower extremities.  Patient initially presented to the ED earlier this month for similar complaints and was transferred to [facility] at which point she was diagnosed with atopic dermatitis, contact dermatitis, with underlying component of cellulitis.  Patient was evaluated by Dermatology who recommended triamcinolone cream, p.o. antibiotics keflex and doxycycline, steroid cream, p.o. Benadryl, extensive skin care regimen, and outpatient follow-up with Dermatology Clinic at [facility].  Since being discharge back on 05/9/2022, patient reported that her rash on her back, torso, and upper extremities have improved but has had worsening swelling, erythema, and persistent itching throughout her extremities with lower being greater than upper.  Patient reported completion of antibiotics in addition to compliance with skin care regimen set forth by Dermatology but continues to endorse symptoms as noted above. She reports she has not yet followed up with Dermatology outpatient as  directed and has had no relief from over the counter medications.  She denied endorse any fever, chills, sweats, nausea, vomiting, chest pain, shortness of breath, abdominal pain, changes in bowel/bladder habits, or neurological deficits and reported all other review of systems negative except those noted above.  ED staff attempted to have patient transferred to [facility] to be re-evaluated by Dermatology but is currently on diversion is no pets are available.  Patient being admitted to Hospital Medicine for observation and continue medical management while awaiting transfer in the morning to [facility] for high-level dermatological care.    PCP: Sofi Kennedy        Past Medical History:   Diagnosis Date    Asthma     Bipolar 1 disorder     Cocaine abuse     DM mellitus, gestational     ETOH abuse     Hypertension     Methamphetamine abuse     Opiate overdose     Seizures     Trichomonas infection        Past Surgical History:   Procedure Laterality Date     SECTION, LOW TRANSVERSE      x 3    ECTOPIC PREGNANCY SURGERY      KNEE SURGERY  2010    left knee    SALPINGECTOMY      THYROID SURGERY      TUBAL LIGATION         Review of patient's allergies indicates:   Allergen Reactions    Tangerine Anaphylaxis    Sweet orange(citrus sinensis) Hives    Oranges [orange]        No current facility-administered medications on file prior to encounter.     Current Outpatient Medications on File Prior to Encounter   Medication Sig    amLODIPine (NORVASC) 10 MG tablet Take 1 tablet (10 mg total) by mouth once daily.    cetirizine (ZYRTEC) 10 MG tablet Take 1 tablet (10 mg total) by mouth once daily.    diphenhydrAMINE (BENADRYL) 25 mg capsule Take 1 capsule (25 mg total) by mouth every 6 (six) hours as needed for Itching.    EScitalopram oxalate (LEXAPRO) 20 MG tablet Take 20 mg by mouth every morning.    famotidine (PEPCID) 20 MG tablet Take 1 tablet (20 mg total) by mouth 2 (two) times  daily.    hydroCHLOROthiazide (HYDRODIURIL) 25 MG tablet Take 1 tablet (25 mg total) by mouth once daily.    ibuprofen (ADVIL,MOTRIN) 600 MG tablet Take 1 tablet (600 mg total) by mouth every 6 (six) hours as needed for Pain.    meloxicam (MOBIC) 15 MG tablet Take 15 mg by mouth once daily.    menthol 3.2 mg Lozg by Mucous Membrane route.    methocarbamol (ROBAXIN) 500 MG Tab Take 500 mg by mouth 4 (four) times daily.    naloxone (NARCAN) 4 mg/actuation Spry 4mg by nasal route as needed for opioid overdose; may repeat every 2-3 minutes in alternating nostrils until medical help arrives. Call 911    ondansetron (ZOFRAN-ODT) 4 MG TbDL Take 1 tablet (4 mg total) by mouth every 8 (eight) hours as needed.    phenytoin (DILANTIN) 100 MG ER capsule Take 1 capsule (100 mg total) by mouth 3 (three) times daily.    quetiapine (SEROQUEL) 50 MG tablet Take 400 mg by mouth every evening.    acetaminophen (TYLENOL) 325 MG tablet Take 2 tablets (650 mg total) by mouth every 6 (six) hours as needed for Pain (or fever).    albuterol sulfate 2.5 mg/0.5 mL Nebu Take 2.5 mg by nebulization every 4 (four) hours as needed (cough and wheezing). Rescue    ALPRAZolam (XANAX) 1 MG tablet Take 1 mg by mouth.    dextroamphetamine-amphetamine 30 mg Tab Take 1 tablet by mouth 2 (two) times daily.    promethazine (PHENERGAN) 25 MG tablet Take 1 tablet (25 mg total) by mouth every 6 (six) hours as needed for Nausea.    [DISCONTINUED] cloNIDine (CATAPRES) 0.1 MG tablet Take 0.1 mg by mouth every evening.    [DISCONTINUED] losartan-hydrochlorothiazide 100-12.5 mg (HYZAAR) 100-12.5 mg Tab Take 1 tablet by mouth once daily.     Family History       Problem Relation (Age of Onset)    Cancer Mother, Father, Maternal Grandmother, Maternal Grandfather    Heart disease Mother          Tobacco Use    Smoking status: Current Some Day Smoker     Types: Cigars, Cigarettes    Smokeless tobacco: Never Used    Tobacco comment: Patient states  "she smokes 1 cigar per week.   Substance and Sexual Activity    Alcohol use: Yes     Comment: binge drinker    Drug use: Yes     Types: Marijuana, "Crack" cocaine, MDMA (Ecstacy), Amphetamines, Cocaine, Methamphetamines, Heroin    Sexual activity: Not Currently     Review of Systems   All other systems reviewed and are negative.  Objective:     Vital Signs (Most Recent):  Temp: 98.2 °F (36.8 °C) (05/23/22 1629)  Pulse: 97 (05/23/22 2306)  Resp: 18 (05/23/22 2031)  BP: (!) 137/91 (05/23/22 1629)  SpO2: 100 % (05/23/22 2306)   Vital Signs (24h Range):  Temp:  [98.2 °F (36.8 °C)] 98.2 °F (36.8 °C)  Pulse:  [] 97  Resp:  [18] 18  SpO2:  [99 %-100 %] 100 %  BP: (137)/(91) 137/91     Weight: 89.8 kg (198 lb)  Body mass index is 31.96 kg/m².    Physical Exam  Constitutional:       General: She is in acute distress.      Appearance: Normal appearance. She is obese. She is not ill-appearing, toxic-appearing or diaphoretic.   HENT:      Head: Normocephalic and atraumatic.      Right Ear: External ear normal.      Left Ear: External ear normal.      Nose: Nose normal. No congestion or rhinorrhea.      Mouth/Throat:      Mouth: Mucous membranes are moist.      Pharynx: Oropharynx is clear. No oropharyngeal exudate or posterior oropharyngeal erythema.   Eyes:      General: No scleral icterus.     Extraocular Movements: Extraocular movements intact.      Conjunctiva/sclera: Conjunctivae normal.      Pupils: Pupils are equal, round, and reactive to light.   Neck:      Vascular: No carotid bruit.   Cardiovascular:      Rate and Rhythm: Normal rate and regular rhythm.      Pulses: Normal pulses.      Heart sounds: Normal heart sounds. No murmur heard.    No friction rub. No gallop.   Pulmonary:      Effort: Pulmonary effort is normal. No respiratory distress.      Breath sounds: Normal breath sounds. No stridor. No wheezing, rhonchi or rales.   Chest:      Chest wall: No tenderness.   Abdominal:      General: Abdomen is " flat. Bowel sounds are normal. There is no distension.      Palpations: Abdomen is soft.      Tenderness: There is no abdominal tenderness. There is no right CVA tenderness, left CVA tenderness, guarding or rebound.      Hernia: No hernia is present.   Musculoskeletal:         General: Tenderness present. No swelling, deformity or signs of injury. Normal range of motion.      Cervical back: Normal range of motion and neck supple. No rigidity or tenderness.      Right lower leg: Edema present.      Left lower leg: Edema present.      Comments: Bilateral lower extremity pitting edema noted with TTP throughout bilateral lower extremities.   Lymphadenopathy:      Cervical: No cervical adenopathy.   Skin:     General: Skin is warm and dry.      Capillary Refill: Capillary refill takes less than 2 seconds.      Coloration: Skin is not jaundiced or pale.      Findings: Erythema and rash present. No bruising or lesion.      Comments: Diffuse erythema and eczematous rash noted throughout bilateral upper and lower extremities.  Rash also noted throughout back, torso, and abdomen which appears much improved compared to the prior photos noted in epic. see media for visual depiction of rash.   Neurological:      General: No focal deficit present.      Mental Status: She is alert and oriented to person, place, and time. Mental status is at baseline.      Cranial Nerves: No cranial nerve deficit.      Sensory: No sensory deficit.      Motor: No weakness.      Coordination: Coordination normal.   Psychiatric:         Mood and Affect: Mood normal.         Behavior: Behavior normal.         Thought Content: Thought content normal.         Judgment: Judgment normal.         CRANIAL NERVES     CN III, IV, VI   Pupils are equal, round, and reactive to light.     Significant Labs: All pertinent labs within the past 24 hours have been reviewed.  Recent Lab Results         05/23/22  1830   05/23/22  1740        Albumin 3.2         Alkaline  Phosphatase 57         ALT 30         Anion Gap 11         AST 33         Baso #   0.02       Basophil %   0.4       BILIRUBIN TOTAL 0.5  Comment: For infants and newborns, interpretation of results should be based  on gestational age, weight and in agreement with clinical  observations.    Premature Infant recommended reference ranges:  Up to 24 hours.............<8.0 mg/dL  Up to 48 hours............<12.0 mg/dL  3-5 days..................<15.0 mg/dL  6-29 days.................<15.0 mg/dL           BUN 5         Calcium 8.6         Chloride 110         CO2 22         Creatinine 0.8         Differential Method   Automated       eGFR if  >60         eGFR if non  >60  Comment: Calculation used to obtain the estimated glomerular filtration  rate (eGFR) is the CKD-EPI equation.            Eos #   0.1       Eosinophil %   2.0       Glucose 85         Gran # (ANC)   2.4       Gran %   47.7       Hematocrit   42.1       Hemoglobin   12.6       Immature Grans (Abs)   0.01  Comment: Mild elevation in immature granulocytes is non specific and   can be seen in a variety of conditions including stress response,   acute inflammation, trauma and pregnancy. Correlation with other   laboratory and clinical findings is essential.         Immature Granulocytes   0.2       Lymph #   1.9       Lymph %   38.0       MCH   32.8       MCHC   29.9       MCV   110       Mono #   0.6       Mono %   11.7       MPV   11.2       nRBC   0       Platelets   49       Potassium 3.2         PROTEIN TOTAL 6.7         RBC   3.84       RDW   13.1       Sed Rate   14       Sodium 143         WBC   4.95               Significant Imaging: I have reviewed all pertinent imaging results/findings within the past 24 hours.    Assessment/Plan:     * Eczematous dermatitis    Rash of body    Urticaria  Patient previously seen and treated by Dermatology earlier this month for diffuse ectopic dermatitis, contact dermatitis, with  underlying superimposed cellulitis.  Patient completed course of antibiotics and continues to use topical steroid cream, moisturizer lotions, as well as a adhering to skin care regimen set forth by Dermatology during last admission. Patient remains afebrile without leukocytosis without obvious erythema, swelling, and diffuse eczematous skin changes throughout body.  Dermatology consult with recommendations to transfer to [facility] for further evaluation.  Patient admitted to Medicine for observation while awaiting transportation given diversion status.  Plan:  -continue conservative therapies  -benadryl and atarax prn   -continue skin moisturizers and typical steroid cream   -continue pain control, titrate as needed  -bowel regimen  -f/u with transfer       Seizure  Chronic. Stable on home medications.  Plan:  -continue dilantin   -seizure precautions       HTN (hypertension)  BP currently 130s/90s. BP likely elevated secondary to underlying pain component in addition to missed home dose of antihypertensives as BP usually well controlled per patient with home medications.  Plan:  -Optimize pain control   -Continue home medications, titrate as needed   -Monitor BP  -Low salt/cardiac diet  -IV hydralazine prn for SBP>160 or DBP>90         Anxiety and depression    Bipolar 1 disorder  Chronic. Stable. Not in acute exacerbation and currently denies endorsing any suicidal/homicidal ideations.   Plan:  -Continue home medications       Polysubstance abuse  Patient has reported history of substance abuse but denies use currently. Patient without signs/symptoms of withdrawals and was educated on morbidity and mortality associated with continued usage.   Plan:  -monitor for signs/symptoms of withdrawals, treat accordingly       Gastroesophageal reflux disease without esophagitis  Chronic. Stable. Currently asymptomatic. Home medications include PPI/Antacids as needed.  Plan:  -Continue PPI/Antacids as needed       Class 1  obesity due to excess calories with serious comorbidity and body mass index (BMI) of 30.0 to 30.9 in adult  Body mass index is 31.96 kg/m². Morbid obesity complicates all aspects of disease management from diagnostic modalities to treatment. Weight loss encouraged and health benefits explained to patient.  Plan:  -low fat/low calorie diet        VTE Risk Mitigation (From admission, onward)         Ordered     enoxaparin injection 40 mg  Daily         05/24/22 0213     IP VTE HIGH RISK PATIENT  Once         05/24/22 0213     Place sequential compression device  Until discontinued         05/24/22 0213              //Core Measures   -DVT proph: SCDs, withholding anticoagulation pending surgical intervention   -Code status Full  -Surrogate: none provided     Components of this note were documented using a voice recognition system and are subject to errors not corrected at the time the document was proof read. Please contact the author for any clarifications.       Dung Gonzalez MD  Department of Hospital Medicine   [facility] - Emergency Dept

## 2022-05-24 NOTE — ASSESSMENT & PLAN NOTE
Chronic. Stable. Currently asymptomatic. Home medications include PPI/Antacids as needed.  Plan:  -Continue PPI/Antacids as needed

## 2022-05-24 NOTE — ED PROVIDER NOTES
Encounter Date: 5/23/2022       History     Chief Complaint   Patient presents with    Leg Swelling     Pt states that she was seen here two weeks ago for pedal edema and took all medication but arms and legs are still swelling with SOB.      HPI     41-year-old female with past medical history of hypertension, bipolar disorder, seizures and recent hospital admission for cellulitis (05/07/2022) who presents with worsening leg pain and swelling.  Patient states since she was discharged from [facility] on May 9, 2022, her symptoms have been increasingly worse.  Per discharge summary, patient admitted on May 7, 2022 after presenting initially at [facility] emergency department with a diffuse body rash.  There was some concern for necrotizing fasciitis and patient was transferred to [facility] for dermatology evaluation.  On dermatology evaluation, patient was thought to have atopic dermatitis with cellulitis.  She was treated with IV antibiotics.  She was eventually transitioned to p.o. Keflex and discharged with Keflex, doxycycline and steroids.  Patient reports that she completed this medication regimen.  However, she was not contacted to follow-up with dermatology.  Per discharge summary, patient was to call [facility] for follow-up.  Patient also reports that she has used over-the-counter diabetic lotion for her symptoms with no relief.  Denies oral lesions, fever, chills, chest pain, shortness of breath, abdominal pain, urinary symptoms, or other associated symptoms.    Review of patient's allergies indicates:   Allergen Reactions    Tangerine Anaphylaxis    Sweet orange(citrus sinensis) Hives    Oranges [orange]      Past Medical History:   Diagnosis Date    Bipolar 1 disorder     Cocaine abuse     DM mellitus, gestational     ETOH abuse     Hypertension     Methamphetamine abuse     Opiate overdose     Seizures     Trichomonas infection      Past Surgical  "History:   Procedure Laterality Date     SECTION, LOW TRANSVERSE      x 3    ECTOPIC PREGNANCY SURGERY      KNEE SURGERY  2010    left knee    SALPINGECTOMY      THYROID SURGERY      TUBAL LIGATION       Family History   Problem Relation Age of Onset    Heart disease Mother     Cancer Mother         Breast Cancer    Cancer Father         Throat Cancer    Cancer Maternal Grandmother     Cancer Maternal Grandfather      Social History     Tobacco Use    Smoking status: Current Some Day Smoker     Types: Cigars, Cigarettes    Smokeless tobacco: Never Used    Tobacco comment: Patient states she smokes 1 cigar per week.   Substance Use Topics    Alcohol use: Yes     Comment: binge drinker    Drug use: Yes     Types: Marijuana, "Crack" cocaine, MDMA (Ecstacy), Amphetamines, Cocaine, Methamphetamines, Heroin     Review of Systems   Constitutional: Negative for chills and fever.   HENT: Negative for congestion and drooling.    Eyes: Negative for visual disturbance.   Respiratory: Negative for shortness of breath.    Cardiovascular: Positive for leg swelling.   Gastrointestinal: Negative for abdominal pain, nausea and vomiting.   Genitourinary: Negative for dysuria and genital sores.   Musculoskeletal: Negative for back pain and neck pain.   Skin: Positive for rash.   Neurological: Negative for syncope, weakness, light-headedness and headaches.   Psychiatric/Behavioral: Negative for confusion.       Physical Exam     Initial Vitals [22 1629]   BP Pulse Resp Temp SpO2   (!) 137/91 104 18 98.2 °F (36.8 °C) 99 %      MAP       --         Physical Exam    Nursing note and vitals reviewed.  Constitutional: She is not diaphoretic.   HENT:   Head: Normocephalic and atraumatic.   Mouth/Throat: Uvula is midline, oropharynx is clear and moist and mucous membranes are normal. No oral lesions. No posterior oropharyngeal edema, posterior oropharyngeal erythema or tonsillar abscesses.   Eyes: Conjunctivae are " normal. No scleral icterus.   Neck: Neck supple. No JVD present.   Normal range of motion.  Cardiovascular: Normal rate, regular rhythm, normal heart sounds and intact distal pulses.   Pulmonary/Chest: Breath sounds normal. No respiratory distress.   Abdominal: Abdomen is soft. She exhibits no distension and no mass. There is no abdominal tenderness. There is no rebound.   Genitourinary:    Vagina normal.      Pelvic exam was performed with patient supine.   There is no rash, tenderness, lesion or injury on the right labia. There is no rash, tenderness, lesion or injury on the left labia.    Genitourinary Comments: Nurse chaperone present     Musculoskeletal:         General: Edema present. Normal range of motion.      Cervical back: Normal range of motion and neck supple.     Neurological: She is alert. She has normal strength.   Skin: Rash noted. There is erythema.   Nikolsky sign negative.   Psychiatric: She has a normal mood and affect.                                 ED Course   Critical Care    Date/Time: 5/24/2022 12:04 AM  Performed by: Michelle Morrison DO  Authorized by: Michelle Morrison DO   Direct patient critical care time: 40 minutes  Additional history critical care time: 10 minutes  Ordering / reviewing critical care time: 10 minutes  Documentation critical care time: 10 minutes  Consulting other physicians critical care time: 10 minutes  Total critical care time (exclusive of procedural time) : 80 minutes  Critical care time was exclusive of separately billable procedures and treating other patients and teaching time.  Critical care was necessary to treat or prevent imminent or life-threatening deterioration of the following conditions: dehydration and circulatory failure.  Critical care was time spent personally by me on the following activities: development of treatment plan with patient or surrogate, discussions with consultants, obtaining history from patient or surrogate,  examination of patient, evaluation of patient's response to treatment, ordering and performing treatments and interventions, ordering and review of laboratory studies, re-evaluation of patient's condition and review of old charts.        Labs Reviewed   CBC W/ AUTO DIFFERENTIAL - Abnormal; Notable for the following components:       Result Value    RBC 3.84 (*)      (*)     MCH 32.8 (*)     MCHC 29.9 (*)     Platelets 49 (*)     All other components within normal limits   COMPREHENSIVE METABOLIC PANEL - Abnormal; Notable for the following components:    Potassium 3.2 (*)     CO2 22 (*)     BUN 5 (*)     Calcium 8.6 (*)     Albumin 3.2 (*)     All other components within normal limits   SEDIMENTATION RATE   COMPREHENSIVE METABOLIC PANEL   SEDIMENTATION RATE   POCT URINE PREGNANCY          Imaging Results    None          Medications - No data to display  Medical Decision Making:   History:   Old Medical Records: I decided to obtain old medical records.  Clinical Tests:   Lab Tests: Ordered and Reviewed  ED Management:   MDM  This is an emergent evaluation of a 41-year-old female with past medical history of hypertension, bipolar disorder, seizures and recent hospital admission for cellulitis (05/07/2022) who presents with worsening leg pain and swelling. Initial vitals in the ED unremarkable. Physical exam noted above. DDx includes but is not limited to Exfoliative erythroderma, Drug rash, Cellulitis, DRESS. Also considered but clinically less likely to be SJS, TEN. Labs including CBC, CMP, ESR was obtained. Labs reveal a mild hypokalemia and a thrombocytopenia. No imaging clinically indicated at this time. Given history and presentation, patient was consulted to and discussed with Dermatology, Dr. Franco, who stated she was the resident and would run case by her attending. Per Dr. Franco, her attending stated she is not able to give recommendations and the patient should be transferred for dermatology  evaluation if needed, based on my clinical judgement. I discussed my concerns about the rash appearing acutely worse to me and also the patient not having good follow-up, with the patient. She is requesting to be transferred, stating her symptoms have been unbearable at home. Transfer center notified.     Michelle Morrison D.O  EMERGENCY MEDICINE  7:39 PM 05/23/2022      UPDATE  Potassium was replaced. Patient was also treated with Benadryl, Morphine for pain and Prednisone. I discussed case with hospital medicine at [facility], Dr. Beckham, who accepted the patient. However, we were notified by the transfer center that there would be a delay in the patient getting an inpatient bed. Will sign patient out to Dr. Patton while pending final disposition. Patient aware of plan. She requested more medication for itching. She was treated with Vistaril.     Michelle Morrison D.O  EMERGENCY MEDICINE   11:58 PM 05/23/2022      UPDATE  Given delay in transfer greater than 4 hours at this time, case discussed with hospital medicine for admission here at [facility] until bed available for transfer to [facility] for dermatology evaluation. Cased discussed with Dr. Gonzalez and she was placed on Dr. Coburn's service. Patient aware and agreeable to the plan.      Michelle Morrison D.O  EMERGENCY MEDICINE   12:39 AM 05/24/2022                     Clinical Impression:   Final diagnoses:  [R21] Rash of body (Primary)  [M79.89] Leg swelling          ED Disposition Condition    Transfer to Another Facility Stable              Michelle Morrison,   05/24/22 0041

## 2022-05-24 NOTE — ASSESSMENT & PLAN NOTE
Patient previously seen and treated by Dermatology earlier this month for diffuse ectopic dermatitis, contact dermatitis, with underlying superimposed cellulitis.  Patient completed course of antibiotics and continues to use topical steroid cream, moisturizer lotions, as well as a adhering to skin care regimen set forth by Dermatology during last admission. Patient remains afebrile without leukocytosis without obvious erythema, swelling, and diffuse eczematous skin changes throughout body.  Dermatology consult with recommendations to transfer to [facility] [place] for further evaluation.  Patient admitted to Medicine for observation while awaiting transportation given diversion status.  Plan:  -continue conservative therapies  -benadryl and atarax prn   -continue skin moisturizers and typical steroid cream   -continue pain control, titrate as needed  -bowel regimen  -f/u with transfer

## 2022-05-24 NOTE — ASSESSMENT & PLAN NOTE
Body mass index is 31.96 kg/m². Morbid obesity complicates all aspects of disease management from diagnostic modalities to treatment. Weight loss encouraged and health benefits explained to patient.  Plan:  -low fat/low calorie diet

## 2022-05-24 NOTE — CARE UPDATE
Assumed care of patient. Admitted with whole body macular erythema with swelling especially in legs. Rash is pruritic and tender to touch. Palms and soles involved. No oral involvement. She is lethargic but awakens to voice. No renal/liver involvement. No eosinophilia. Reviewed recommendations from Dermatology- triamcinolone cream ordered. Awaiting transfer to [facility] for Dermatology.     Anna Laureano MD  05/24/2022  1:28 PM    Called transfer center. No beds currently available.   Anna Laureano MD  05/24/2022  4:48 PM

## 2022-05-24 NOTE — SUBJECTIVE & OBJECTIVE
Past Medical History:   Diagnosis Date    Asthma     Bipolar 1 disorder     Cocaine abuse     DM mellitus, gestational     ETOH abuse     Hypertension     Methamphetamine abuse     Opiate overdose     Seizures     Trichomonas infection        Past Surgical History:   Procedure Laterality Date     SECTION, LOW TRANSVERSE      x 3    ECTOPIC PREGNANCY SURGERY      KNEE SURGERY  2010    left knee    SALPINGECTOMY      THYROID SURGERY      TUBAL LIGATION         Review of patient's allergies indicates:   Allergen Reactions    Tangerine Anaphylaxis    Sweet orange(citrus sinensis) Hives    Oranges [orange]        No current facility-administered medications on file prior to encounter.     Current Outpatient Medications on File Prior to Encounter   Medication Sig    amLODIPine (NORVASC) 10 MG tablet Take 1 tablet (10 mg total) by mouth once daily.    cetirizine (ZYRTEC) 10 MG tablet Take 1 tablet (10 mg total) by mouth once daily.    diphenhydrAMINE (BENADRYL) 25 mg capsule Take 1 capsule (25 mg total) by mouth every 6 (six) hours as needed for Itching.    EScitalopram oxalate (LEXAPRO) 20 MG tablet Take 20 mg by mouth every morning.    famotidine (PEPCID) 20 MG tablet Take 1 tablet (20 mg total) by mouth 2 (two) times daily.    hydroCHLOROthiazide (HYDRODIURIL) 25 MG tablet Take 1 tablet (25 mg total) by mouth once daily.    ibuprofen (ADVIL,MOTRIN) 600 MG tablet Take 1 tablet (600 mg total) by mouth every 6 (six) hours as needed for Pain.    meloxicam (MOBIC) 15 MG tablet Take 15 mg by mouth once daily.    menthol 3.2 mg Lozg by Mucous Membrane route.    methocarbamol (ROBAXIN) 500 MG Tab Take 500 mg by mouth 4 (four) times daily.    naloxone (NARCAN) 4 mg/actuation Spry 4mg by nasal route as needed for opioid overdose; may repeat every 2-3 minutes in alternating nostrils until medical help arrives. Call 911    ondansetron (ZOFRAN-ODT) 4 MG TbDL Take 1 tablet (4 mg total) by mouth every 8 (eight) hours as  "needed.    phenytoin (DILANTIN) 100 MG ER capsule Take 1 capsule (100 mg total) by mouth 3 (three) times daily.    quetiapine (SEROQUEL) 50 MG tablet Take 400 mg by mouth every evening.    acetaminophen (TYLENOL) 325 MG tablet Take 2 tablets (650 mg total) by mouth every 6 (six) hours as needed for Pain (or fever).    albuterol sulfate 2.5 mg/0.5 mL Nebu Take 2.5 mg by nebulization every 4 (four) hours as needed (cough and wheezing). Rescue    ALPRAZolam (XANAX) 1 MG tablet Take 1 mg by mouth.    dextroamphetamine-amphetamine 30 mg Tab Take 1 tablet by mouth 2 (two) times daily.    promethazine (PHENERGAN) 25 MG tablet Take 1 tablet (25 mg total) by mouth every 6 (six) hours as needed for Nausea.    [DISCONTINUED] cloNIDine (CATAPRES) 0.1 MG tablet Take 0.1 mg by mouth every evening.    [DISCONTINUED] losartan-hydrochlorothiazide 100-12.5 mg (HYZAAR) 100-12.5 mg Tab Take 1 tablet by mouth once daily.     Family History       Problem Relation (Age of Onset)    Cancer Mother, Father, Maternal Grandmother, Maternal Grandfather    Heart disease Mother          Tobacco Use    Smoking status: Current Some Day Smoker     Types: Cigars, Cigarettes    Smokeless tobacco: Never Used    Tobacco comment: Patient states she smokes 1 cigar per week.   Substance and Sexual Activity    Alcohol use: Yes     Comment: binge drinker    Drug use: Yes     Types: Marijuana, "Crack" cocaine, MDMA (Ecstacy), Amphetamines, Cocaine, Methamphetamines, Heroin    Sexual activity: Not Currently     Review of Systems   All other systems reviewed and are negative.  Objective:     Vital Signs (Most Recent):  Temp: 98.2 °F (36.8 °C) (05/23/22 1629)  Pulse: 97 (05/23/22 2306)  Resp: 18 (05/23/22 2031)  BP: (!) 137/91 (05/23/22 1629)  SpO2: 100 % (05/23/22 2306)   Vital Signs (24h Range):  Temp:  [98.2 °F (36.8 °C)] 98.2 °F (36.8 °C)  Pulse:  [] 97  Resp:  [18] 18  SpO2:  [99 %-100 %] 100 %  BP: (137)/(91) 137/91     Weight: 89.8 kg (198 lb)  Body " mass index is 31.96 kg/m².    Physical Exam  Constitutional:       General: She is in acute distress.      Appearance: Normal appearance. She is obese. She is not ill-appearing, toxic-appearing or diaphoretic.   HENT:      Head: Normocephalic and atraumatic.      Right Ear: External ear normal.      Left Ear: External ear normal.      Nose: Nose normal. No congestion or rhinorrhea.      Mouth/Throat:      Mouth: Mucous membranes are moist.      Pharynx: Oropharynx is clear. No oropharyngeal exudate or posterior oropharyngeal erythema.   Eyes:      General: No scleral icterus.     Extraocular Movements: Extraocular movements intact.      Conjunctiva/sclera: Conjunctivae normal.      Pupils: Pupils are equal, round, and reactive to light.   Neck:      Vascular: No carotid bruit.   Cardiovascular:      Rate and Rhythm: Normal rate and regular rhythm.      Pulses: Normal pulses.      Heart sounds: Normal heart sounds. No murmur heard.    No friction rub. No gallop.   Pulmonary:      Effort: Pulmonary effort is normal. No respiratory distress.      Breath sounds: Normal breath sounds. No stridor. No wheezing, rhonchi or rales.   Chest:      Chest wall: No tenderness.   Abdominal:      General: Abdomen is flat. Bowel sounds are normal. There is no distension.      Palpations: Abdomen is soft.      Tenderness: There is no abdominal tenderness. There is no right CVA tenderness, left CVA tenderness, guarding or rebound.      Hernia: No hernia is present.   Musculoskeletal:         General: Tenderness present. No swelling, deformity or signs of injury. Normal range of motion.      Cervical back: Normal range of motion and neck supple. No rigidity or tenderness.      Right lower leg: Edema present.      Left lower leg: Edema present.      Comments: Bilateral lower extremity pitting edema noted with TTP throughout bilateral lower extremities.   Lymphadenopathy:      Cervical: No cervical adenopathy.   Skin:     General: Skin is  warm and dry.      Capillary Refill: Capillary refill takes less than 2 seconds.      Coloration: Skin is not jaundiced or pale.      Findings: Erythema and rash present. No bruising or lesion.      Comments: Diffuse erythema and eczematous rash noted throughout bilateral upper and lower extremities.  Rash also noted throughout back, torso, and abdomen which appears much improved compared to the prior photos noted in epic. see media for visual depiction of rash.   Neurological:      General: No focal deficit present.      Mental Status: She is alert and oriented to person, place, and time. Mental status is at baseline.      Cranial Nerves: No cranial nerve deficit.      Sensory: No sensory deficit.      Motor: No weakness.      Coordination: Coordination normal.   Psychiatric:         Mood and Affect: Mood normal.         Behavior: Behavior normal.         Thought Content: Thought content normal.         Judgment: Judgment normal.         CRANIAL NERVES     CN III, IV, VI   Pupils are equal, round, and reactive to light.     Significant Labs: All pertinent labs within the past 24 hours have been reviewed.  Recent Lab Results         05/23/22  1830   05/23/22  1740        Albumin 3.2         Alkaline Phosphatase 57         ALT 30         Anion Gap 11         AST 33         Baso #   0.02       Basophil %   0.4       BILIRUBIN TOTAL 0.5  Comment: For infants and newborns, interpretation of results should be based  on gestational age, weight and in agreement with clinical  observations.    Premature Infant recommended reference ranges:  Up to 24 hours.............<8.0 mg/dL  Up to 48 hours............<12.0 mg/dL  3-5 days..................<15.0 mg/dL  6-29 days.................<15.0 mg/dL           BUN 5         Calcium 8.6         Chloride 110         CO2 22         Creatinine 0.8         Differential Method   Automated       eGFR if  >60         eGFR if non  >60  Comment: Calculation  used to obtain the estimated glomerular filtration  rate (eGFR) is the CKD-EPI equation.            Eos #   0.1       Eosinophil %   2.0       Glucose 85         Gran # (ANC)   2.4       Gran %   47.7       Hematocrit   42.1       Hemoglobin   12.6       Immature Grans (Abs)   0.01  Comment: Mild elevation in immature granulocytes is non specific and   can be seen in a variety of conditions including stress response,   acute inflammation, trauma and pregnancy. Correlation with other   laboratory and clinical findings is essential.         Immature Granulocytes   0.2       Lymph #   1.9       Lymph %   38.0       MCH   32.8       MCHC   29.9       MCV   110       Mono #   0.6       Mono %   11.7       MPV   11.2       nRBC   0       Platelets   49       Potassium 3.2         PROTEIN TOTAL 6.7         RBC   3.84       RDW   13.1       Sed Rate   14       Sodium 143         WBC   4.95               Significant Imaging: I have reviewed all pertinent imaging results/findings within the past 24 hours.

## 2022-05-25 PROBLEM — R21 RASH OF BODY: Status: RESOLVED | Noted: 2022-05-24 | Resolved: 2022-05-25

## 2022-05-25 PROBLEM — E87.6 HYPOKALEMIA: Status: ACTIVE | Noted: 2022-05-25

## 2022-05-25 PROBLEM — D75.89 MACROCYTOSIS: Status: ACTIVE | Noted: 2022-05-25

## 2022-05-25 PROBLEM — L03.90 CELLULITIS: Status: RESOLVED | Noted: 2022-05-07 | Resolved: 2022-05-25

## 2022-05-25 PROBLEM — D69.6 THROMBOCYTOPENIA: Status: ACTIVE | Noted: 2022-05-25

## 2022-05-25 LAB
ALBUMIN SERPL BCP-MCNC: 3.5 G/DL (ref 3.5–5.2)
ALP SERPL-CCNC: 59 U/L (ref 55–135)
ALT SERPL W/O P-5'-P-CCNC: 28 U/L (ref 10–44)
ANION GAP SERPL CALC-SCNC: 10 MMOL/L (ref 8–16)
AST SERPL-CCNC: 33 U/L (ref 10–40)
BASOPHILS # BLD AUTO: 0.03 K/UL (ref 0–0.2)
BASOPHILS NFR BLD: 0.5 % (ref 0–1.9)
BILIRUB SERPL-MCNC: 0.5 MG/DL (ref 0.1–1)
BUN SERPL-MCNC: 7 MG/DL (ref 6–20)
CALCIUM SERPL-MCNC: 9.4 MG/DL (ref 8.7–10.5)
CHLORIDE SERPL-SCNC: 106 MMOL/L (ref 95–110)
CO2 SERPL-SCNC: 23 MMOL/L (ref 23–29)
CREAT SERPL-MCNC: 1.1 MG/DL (ref 0.5–1.4)
DIFFERENTIAL METHOD: ABNORMAL
EOSINOPHIL # BLD AUTO: 0.1 K/UL (ref 0–0.5)
EOSINOPHIL NFR BLD: 1.6 % (ref 0–8)
ERYTHROCYTE [DISTWIDTH] IN BLOOD BY AUTOMATED COUNT: 12.8 % (ref 11.5–14.5)
EST. GFR  (AFRICAN AMERICAN): >60 ML/MIN/1.73 M^2
EST. GFR  (NON AFRICAN AMERICAN): >60 ML/MIN/1.73 M^2
GLUCOSE SERPL-MCNC: 89 MG/DL (ref 70–110)
HCT VFR BLD AUTO: 39.6 % (ref 37–48.5)
HGB BLD-MCNC: 12.7 G/DL (ref 12–16)
IMM GRANULOCYTES # BLD AUTO: 0.01 K/UL (ref 0–0.04)
IMM GRANULOCYTES NFR BLD AUTO: 0.2 % (ref 0–0.5)
LYMPHOCYTES # BLD AUTO: 2.5 K/UL (ref 1–4.8)
LYMPHOCYTES NFR BLD: 39.7 % (ref 18–48)
MCH RBC QN AUTO: 32.8 PG (ref 27–31)
MCHC RBC AUTO-ENTMCNC: 32.1 G/DL (ref 32–36)
MCV RBC AUTO: 102 FL (ref 82–98)
MONOCYTES # BLD AUTO: 0.6 K/UL (ref 0.3–1)
MONOCYTES NFR BLD: 9 % (ref 4–15)
NEUTROPHILS # BLD AUTO: 3.1 K/UL (ref 1.8–7.7)
NEUTROPHILS NFR BLD: 49 % (ref 38–73)
NRBC BLD-RTO: 0 /100 WBC
PLATELET # BLD AUTO: 295 K/UL (ref 150–450)
PMV BLD AUTO: 9.3 FL (ref 9.2–12.9)
POTASSIUM SERPL-SCNC: 3.8 MMOL/L (ref 3.5–5.1)
PROT SERPL-MCNC: 7.4 G/DL (ref 6–8.4)
RBC # BLD AUTO: 3.87 M/UL (ref 4–5.4)
SODIUM SERPL-SCNC: 139 MMOL/L (ref 136–145)
WBC # BLD AUTO: 6.32 K/UL (ref 3.9–12.7)

## 2022-05-25 PROCEDURE — 80053 COMPREHEN METABOLIC PANEL: CPT | Performed by: HOSPITALIST

## 2022-05-25 PROCEDURE — 21400001 HC TELEMETRY ROOM

## 2022-05-25 PROCEDURE — 25000003 PHARM REV CODE 250: Performed by: HOSPITALIST

## 2022-05-25 PROCEDURE — 36415 COLL VENOUS BLD VENIPUNCTURE: CPT | Performed by: HOSPITALIST

## 2022-05-25 PROCEDURE — 85025 COMPLETE CBC W/AUTO DIFF WBC: CPT | Performed by: HOSPITALIST

## 2022-05-25 RX ORDER — TRIAMCINOLONE ACETONIDE 1 MG/G
CREAM TOPICAL 2 TIMES DAILY
Status: DISCONTINUED | OUTPATIENT
Start: 2022-05-25 | End: 2022-05-26

## 2022-05-25 RX ADMIN — TRIAMCINOLONE ACETONIDE: 1 OINTMENT TOPICAL at 10:05

## 2022-05-25 RX ADMIN — FAMOTIDINE 20 MG: 20 TABLET ORAL at 08:05

## 2022-05-25 RX ADMIN — HYDROCODONE BITARTRATE AND ACETAMINOPHEN 1 TABLET: 5; 325 TABLET ORAL at 07:05

## 2022-05-25 RX ADMIN — HYDROCHLOROTHIAZIDE 25 MG: 12.5 TABLET ORAL at 10:05

## 2022-05-25 RX ADMIN — ESCITALOPRAM OXALATE 20 MG: 10 TABLET ORAL at 06:05

## 2022-05-25 RX ADMIN — PHENYTOIN SODIUM 100 MG: 100 CAPSULE ORAL at 10:05

## 2022-05-25 RX ADMIN — SENNOSIDES AND DOCUSATE SODIUM 1 TABLET: 50; 8.6 TABLET ORAL at 08:05

## 2022-05-25 RX ADMIN — FAMOTIDINE 20 MG: 20 TABLET ORAL at 10:05

## 2022-05-25 RX ADMIN — QUETIAPINE FUMARATE 400 MG: 200 TABLET ORAL at 08:05

## 2022-05-25 RX ADMIN — CETIRIZINE HYDROCHLORIDE 10 MG: 10 TABLET, FILM COATED ORAL at 10:05

## 2022-05-25 RX ADMIN — PHENYTOIN SODIUM 100 MG: 100 CAPSULE ORAL at 04:05

## 2022-05-25 RX ADMIN — TRIAMCINOLONE ACETONIDE: 1 CREAM TOPICAL at 08:05

## 2022-05-25 RX ADMIN — PHENYTOIN SODIUM 100 MG: 100 CAPSULE ORAL at 08:05

## 2022-05-25 NOTE — CONSULTS
"2036: Consultation was conducted with the patient and her medical provider. Initially after my introduction to the patient, she stated, " I am sleepy!" My further query to the patient was, did you request a visit from the ? She answered yes, I did! What did you desire to talk to the  about? She answered, please pray for me and my family. Due to the urgency in her stating she was sleepy, I stopped and offered prayers.  After prayer the patient presented as being more willing to engage in conversation.. She showed me her swollen leg.  Prior to leaving the room I noticed that she had two containers of high sodium snacks and a can of soup that was also extremely high in sodium content.  With the patient's permission these items were removed and brought to the Nurses' station.  The patient's medical provider disclosed that this patient is the Mother of the children who were drowned in the [place] incident last week.  I revisited the patient and offered my condolences. She freely engaged again in conversation revealing her concerns and grief.  The Spiritual Care Team will continue to offer spiritual support to this patient.        MISHEL Romano   [phone]    "

## 2022-05-25 NOTE — PROGRESS NOTES
[facility] Medicine  Progress Note    Patient Name: Nicole Collins  MRN: [id number]  Patient Class: IP- Inpatient   Admission Date: 5/23/2022  Length of Stay: 1 days  Attending Physician: Anna Laureano MD  Primary Care Provider: Sofi Kennedy MD        Subjective:     Principal Problem:Eczematous dermatitis        HPI:  Nicole Collins is a 41 y.o. female with a PMH  has a past medical history of Asthma, Bipolar 1 disorder, Cocaine abuse, DM mellitus, gestational, ETOH abuse, Hypertension, Methamphetamine abuse, Opiate overdose, Seizures, and Trichomonas infection. who presented to the ED complaining of worsening swelling, erythema, and itching of her lower extremities.  Patient initially presented to the ED earlier this month for similar complaints and was transferred to [facility] at which point she was diagnosed with atopic dermatitis, contact dermatitis, with underlying component of cellulitis.  Patient was evaluated by Dermatology who recommended triamcinolone cream, p.o. antibiotics keflex and doxycycline, steroid cream, p.o. Benadryl, extensive skin care regimen, and outpatient follow-up with Dermatology Clinic at [facility].  Since being discharge back on 05/9/2022, patient reported that her rash on her back, torso, and upper extremities have improved but has had worsening swelling, erythema, and persistent itching throughout her extremities with lower being greater than upper.  Patient reported completion of antibiotics in addition to compliance with skin care regimen set forth by Dermatology but continues to endorse symptoms as noted above. She reports she has not yet followed up with Dermatology outpatient as directed and has had no relief from over the counter medications.  She denied endorse any fever, chills, sweats, nausea, vomiting, chest pain, shortness of breath, abdominal pain, changes in bowel/bladder habits, or neurological deficits and reported all other review of  systems negative except those noted above.  ED staff attempted to have patient transferred to [facility] to be re-evaluated by Dermatology but is currently on diversion is no pets are available.  Patient being admitted to Hospital Medicine for observation and continue medical management while awaiting transfer in the morning to [facility] for high-level dermatological care.    PCP: Sofi Kennedy        Overview/Hospital Course:  Ms Nicole Collins was admitted with diffuse erythematous pruritic blanching macular rash involving the palms and soles but sparing mucous membranes. She has previously been treated for cellulitis with PO Keflex and Doxy x 10 and has been treated with triamcinolone ointment for suspected atopic dermatitis. Despite this, her rash has worsened, becoming more widespread. Continued triamcinolone ointment upon admission. Pending transfer to [facility] for Dermatology services.       Interval History: Resting calmly in bed. She is feeling better today than yesterday. Her skin is less itchy and tender. It is still quite swollen.     Review of Systems   Constitutional:  Negative for chills and fever.   Respiratory:  Negative for chest tightness and shortness of breath.    Cardiovascular:  Positive for leg swelling. Negative for chest pain.   Gastrointestinal:  Negative for abdominal pain, diarrhea, nausea and vomiting.   Genitourinary:  Negative for difficulty urinating.   Musculoskeletal:  Negative for arthralgias and myalgias.   Skin:  Positive for color change and rash.   Psychiatric/Behavioral:  Negative for confusion.    Objective:     Vital Signs (Most Recent):  Temp: 98.3 °F (36.8 °C) (05/25/22 1216)  Pulse: 96 (05/25/22 1216)  Resp: 20 (05/25/22 1216)  BP: (!) 120/58 (05/25/22 1216)  SpO2: 99 % (05/25/22 1216)   Vital Signs (24h Range):  Temp:  [98.1 °F (36.7 °C)-99 °F (37.2 °C)] 98.3 °F (36.8 °C)  Pulse:  [] 96  Resp:  [16-20] 20  SpO2:  [95 %-99 %] 99 %  BP: ()/(55-58) 120/58      Weight: 91.6 kg (202 lb)  Body mass index is 32.6 kg/m².  No intake or output data in the 24 hours ending 05/25/22 1305   Physical Exam  Vitals and nursing note reviewed.   Constitutional:       General: She is not in acute distress.     Appearance: She is obese. She is not toxic-appearing.   HENT:      Head: Normocephalic and atraumatic.      Nose: Nose normal.      Mouth/Throat:      Mouth: Mucous membranes are moist.   Cardiovascular:      Rate and Rhythm: Normal rate and regular rhythm.      Pulses: Normal pulses.      Heart sounds: Normal heart sounds. No murmur heard.    No gallop.   Pulmonary:      Effort: Pulmonary effort is normal. No respiratory distress.      Breath sounds: Normal breath sounds. No wheezing or rales.      Comments: Room air  Abdominal:      General: Bowel sounds are normal. There is no distension.      Palpations: Abdomen is soft.      Tenderness: There is no abdominal tenderness. There is no guarding.   Musculoskeletal:      Right lower leg: Edema present.      Left lower leg: Edema present.   Skin:     Findings: Erythema, lesion and rash present.      Comments: Macular rash on face, trunk, arms, legs   Neurological:      Mental Status: She is alert. Mental status is at baseline.       Significant Labs: All pertinent labs within the past 24 hours have been reviewed.    Significant Imaging: I have reviewed all pertinent imaging results/findings within the past 24 hours.      Assessment/Plan:      * Eczematous dermatitis  Dermatology previously saw the patient and recommended triamcinolone ointment and cream. Patient states that the rash is worsening despite this intervention. She was also previously treated with antibiotics for presumed cellulitis. She has no current signs of infection. She is taking HCTZ and phenytoin, both which can be associated with rash. She does not have mucous membrane involvement. Doubt SJS/TEN/DRESS. No liver/kidney dysfunction.   - continue triamcinolone  -  pending transfer to [facility] for Dermatology consult. Called on 5/25 in AM- they are on diversion. Will call again this afternoon.       Hypokalemia  Replace and monitor  Resolved.       Thrombocytopenia  Noted on admit, now resolved.      Macrocytosis  Noted      Class 1 obesity due to excess calories with serious comorbidity and body mass index (BMI) of 30.0 to 30.9 in adult  Body mass index is 32.6 kg/m².  Weight loss as outpatient        Gastroesophageal reflux disease without esophagitis  Chronic. Stable. Currently asymptomatic. Home medications include PPI/Antacids as needed.  Plan:  -Continue H2 blocker      Anxiety and depression  Continue home [facility]  Spiritual care has visited the patient         Bipolar 1 disorder  Chronic. Stable. Not in acute exacerbation and currently denies endorsing any suicidal/homicidal ideations.   - continue home seroquel      HTN (hypertension)  Continue HCTZ, amlodipine       Seizure  Chronic. Stable on home medications.  -continue dilantin   -seizure precautions         VTE Risk Mitigation (From admission, onward)         Ordered     IP VTE HIGH RISK PATIENT  Once         05/24/22 0213     Place sequential compression device  Until discontinued         05/24/22 0213                Discharge Planning   CHRISTOPHER:      Code Status: Full Code   Is the patient medically ready for discharge?:     Reason for patient still in hospital (select all that apply): Patient trending condition and Pending disposition  Discharge Plan A: Home with family                  Anna Laureano MD  Department of Hospital Medicine   [facility] [place] - Telemetry

## 2022-05-25 NOTE — ASSESSMENT & PLAN NOTE
Patient previously seen and treated by Dermatology earlier this month for diffuse ectopic dermatitis, contact dermatitis, with underlying superimposed cellulitis.  Patient completed course of antibiotics and continues to use topical steroid cream, moisturizer lotions, as well as a adhering to skin care regimen set forth by Dermatology during last admission. Patient remains afebrile without leukocytosis without obvious erythema, swelling, and diffuse eczematous skin changes throughout body.  Dermatology consult with recommendations to transfer to [facility] [place] for further evaluation.  Patient admitted to Medicine for observation while awaiting transportation given diversion status.  Plan:  -continue conservative therapies  -benadryl and atarax prn   -continue skin moisturizers and typical steroid cream   -continue pain control, titrate as needed  -bowel regimen  -f/u with transfer

## 2022-05-25 NOTE — ASSESSMENT & PLAN NOTE
Chronic. Stable. Not in acute exacerbation and currently denies endorsing any suicidal/homicidal ideations.   - continue home seroquel

## 2022-05-25 NOTE — HOSPITAL COURSE
Ms Nicole Collins was admitted with diffuse erythematous pruritic blanching macular rash involving the palms and soles but sparing mucous membranes. She has previously been treated for cellulitis with PO Keflex and Doxy x 10 and has been treated with triamcinolone ointment for suspected atopic dermatitis. Despite this, her rash has worsened, becoming more widespread. Continued triamcinolone ointment upon admission. Transferred to [facility] for Dermatology services. Dermatology seen patient, DDx includes atopic dermatitis vs drug eruption vs allergic contact dermatitis vs CTCL vs malignancy vs psoriasis vs other. Rec start triamcinolone 0.1% ointment BID to affected areas on trunk, arms, and legs. Do not use on face, armpits, or groin. Please supply patient with 454g jar at bedside. Continue regular emollient use at least twice daily with vaseline or Eucerin. Start hydroxyzine 25 mg TID prn for itching. S/p Punch biopsy of the right upper arm performed follow up results. If no improvement with topical steroids over next few days, consider starting po prednisone. Rash appears appears to be improving. However on 5/30 she complained of severe LLE knee pain. MRI of Lt tib fib- no osteo- mild overlying subQ edema. Patient with pain still present- starting inferior to knee and extending downward- concern for nerve compression. Dose of 100mg gabapentin trialed. F/u apt with derm scheduled- as well as PCP.    Pt deemed appropriate for discharge. Plan discussed with pt, who was agreeable and amenable; medications were discussed and reviewed, outpatient follow-up scheduled, ER precautions were given, all questions were answered to the pt's satisfaction, and Ms. Collins was subsequently discharged.    Physical Exam  Gen: in NAD, appears stated age  Neuro: AAOx3, motor, sensory, and strength grossly intact BL  HEENT: NTNC, EOMI, PERRL, MMM  CVS: RRR, no m/r/g; S1/S2 auscultated with no S3 or S4; capillary refill < 2 sec  Resp: lungs  CTAB, no w/r/r; no belabored breathing or accessory muscle use appreciated   Abd: BS+ in all 4 quadrants; NTND, soft to palpation; no organomegaly appreciated   Extrem: pulses full, equal, and regular over all 4 extremities; no UE or LE edema BL  Skin: scaly rash present on arms, legs, trunk

## 2022-05-25 NOTE — ASSESSMENT & PLAN NOTE
Chronic. Stable. Currently asymptomatic. Home medications include PPI/Antacids as needed.  Plan:  -Continue H2 blocker

## 2022-05-25 NOTE — SUBJECTIVE & OBJECTIVE
Interval History: Resting calmly in bed. She is feeling better today than yesterday. Her skin is less itchy and tender. It is still quite swollen.     Review of Systems   Constitutional:  Negative for chills and fever.   Respiratory:  Negative for chest tightness and shortness of breath.    Cardiovascular:  Positive for leg swelling. Negative for chest pain.   Gastrointestinal:  Negative for abdominal pain, diarrhea, nausea and vomiting.   Genitourinary:  Negative for difficulty urinating.   Musculoskeletal:  Negative for arthralgias and myalgias.   Skin:  Positive for color change and rash.   Psychiatric/Behavioral:  Negative for confusion.    Objective:     Vital Signs (Most Recent):  Temp: 98.3 °F (36.8 °C) (05/25/22 1216)  Pulse: 96 (05/25/22 1216)  Resp: 20 (05/25/22 1216)  BP: (!) 120/58 (05/25/22 1216)  SpO2: 99 % (05/25/22 1216)   Vital Signs (24h Range):  Temp:  [98.1 °F (36.7 °C)-99 °F (37.2 °C)] 98.3 °F (36.8 °C)  Pulse:  [] 96  Resp:  [16-20] 20  SpO2:  [95 %-99 %] 99 %  BP: ()/(55-58) 120/58     Weight: 91.6 kg (202 lb)  Body mass index is 32.6 kg/m².  No intake or output data in the 24 hours ending 05/25/22 1305   Physical Exam  Vitals and nursing note reviewed.   Constitutional:       General: She is not in acute distress.     Appearance: She is obese. She is not toxic-appearing.   HENT:      Head: Normocephalic and atraumatic.      Nose: Nose normal.      Mouth/Throat:      Mouth: Mucous membranes are moist.   Cardiovascular:      Rate and Rhythm: Normal rate and regular rhythm.      Pulses: Normal pulses.      Heart sounds: Normal heart sounds. No murmur heard.    No gallop.   Pulmonary:      Effort: Pulmonary effort is normal. No respiratory distress.      Breath sounds: Normal breath sounds. No wheezing or rales.      Comments: Room air  Abdominal:      General: Bowel sounds are normal. There is no distension.      Palpations: Abdomen is soft.      Tenderness: There is no abdominal  tenderness. There is no guarding.   Musculoskeletal:      Right lower leg: Edema present.      Left lower leg: Edema present.   Skin:     Findings: Erythema, lesion and rash present.      Comments: Macular rash on face, trunk, arms, legs   Neurological:      Mental Status: She is alert. Mental status is at baseline.       Significant Labs: All pertinent labs within the past 24 hours have been reviewed.    Significant Imaging: I have reviewed all pertinent imaging results/findings within the past 24 hours.

## 2022-05-25 NOTE — ASSESSMENT & PLAN NOTE
Dermatology previously saw the patient and recommended triamcinolone ointment and cream. Patient states that the rash is worsening despite this intervention. She was also previously treated with antibiotics for presumed cellulitis. She has no current signs of infection. She is taking HCTZ and phenytoin, both which can be associated with rash. She does not have mucous membrane involvement. Doubt SJS/TEN/DRESS. No liver/kidney dysfunction.   - continue triamcinolone  - pending transfer to [facility] for Dermatology consult. Called on 5/25 in AM- they are on diversion. Will call again this afternoon.

## 2022-05-26 DIAGNOSIS — B19.20 HEPATITIS C VIRUS INFECTION WITHOUT HEPATIC COMA, UNSPECIFIED CHRONICITY: Primary | ICD-10-CM

## 2022-05-26 PROBLEM — L53.9 ERYTHRODERMA: Status: ACTIVE | Noted: 2022-05-24

## 2022-05-26 LAB
HCV RNA SERPL NAA+PROBE-ACNC: ABNORMAL IU/ML
POCT GLUCOSE: 99 MG/DL (ref 70–110)

## 2022-05-26 PROCEDURE — 25000003 PHARM REV CODE 250: Performed by: HOSPITALIST

## 2022-05-26 PROCEDURE — 25000003 PHARM REV CODE 250: Performed by: DERMATOLOGY

## 2022-05-26 PROCEDURE — 99233 PR SUBSEQUENT HOSPITAL CARE,LEVL III: ICD-10-PCS | Mod: ,,, | Performed by: STUDENT IN AN ORGANIZED HEALTH CARE EDUCATION/TRAINING PROGRAM

## 2022-05-26 PROCEDURE — 12000002 HC ACUTE/MED SURGE SEMI-PRIVATE ROOM

## 2022-05-26 PROCEDURE — 25000003 PHARM REV CODE 250: Performed by: STUDENT IN AN ORGANIZED HEALTH CARE EDUCATION/TRAINING PROGRAM

## 2022-05-26 PROCEDURE — 99233 SBSQ HOSP IP/OBS HIGH 50: CPT | Mod: ,,, | Performed by: STUDENT IN AN ORGANIZED HEALTH CARE EDUCATION/TRAINING PROGRAM

## 2022-05-26 RX ORDER — TRIAMCINOLONE ACETONIDE 1 MG/G
OINTMENT TOPICAL 2 TIMES DAILY
Status: DISCONTINUED | OUTPATIENT
Start: 2022-05-26 | End: 2022-06-01 | Stop reason: HOSPADM

## 2022-05-26 RX ORDER — DIPHENHYDRAMINE HCL 25 MG
25 CAPSULE ORAL EVERY 6 HOURS PRN
Status: DISCONTINUED | OUTPATIENT
Start: 2022-05-26 | End: 2022-06-01 | Stop reason: HOSPADM

## 2022-05-26 RX ORDER — HYDROXYZINE PAMOATE 25 MG/1
25 CAPSULE ORAL EVERY 8 HOURS PRN
Status: DISCONTINUED | OUTPATIENT
Start: 2022-05-26 | End: 2022-06-01 | Stop reason: HOSPADM

## 2022-05-26 RX ADMIN — SENNOSIDES AND DOCUSATE SODIUM 1 TABLET: 50; 8.6 TABLET ORAL at 09:05

## 2022-05-26 RX ADMIN — TRIAMCINOLONE ACETONIDE: 1 OINTMENT TOPICAL at 11:05

## 2022-05-26 RX ADMIN — FAMOTIDINE 20 MG: 20 TABLET ORAL at 09:05

## 2022-05-26 RX ADMIN — ESCITALOPRAM OXALATE 20 MG: 10 TABLET ORAL at 06:05

## 2022-05-26 RX ADMIN — HYDROXYZINE PAMOATE 25 MG: 25 CAPSULE ORAL at 09:05

## 2022-05-26 RX ADMIN — TRIAMCINOLONE ACETONIDE: 1 OINTMENT TOPICAL at 09:05

## 2022-05-26 RX ADMIN — DIPHENHYDRAMINE HYDROCHLORIDE 25 MG: 25 CAPSULE ORAL at 06:05

## 2022-05-26 RX ADMIN — HYDROCODONE BITARTRATE AND ACETAMINOPHEN 1 TABLET: 5; 325 TABLET ORAL at 09:05

## 2022-05-26 RX ADMIN — AMLODIPINE BESYLATE 10 MG: 10 TABLET ORAL at 09:05

## 2022-05-26 RX ADMIN — PHENYTOIN SODIUM 100 MG: 100 CAPSULE ORAL at 09:05

## 2022-05-26 RX ADMIN — HYDROCHLOROTHIAZIDE 25 MG: 12.5 TABLET ORAL at 09:05

## 2022-05-26 RX ADMIN — CETIRIZINE HYDROCHLORIDE 10 MG: 10 TABLET, FILM COATED ORAL at 09:05

## 2022-05-26 RX ADMIN — PHENYTOIN SODIUM 100 MG: 100 CAPSULE ORAL at 03:05

## 2022-05-26 RX ADMIN — QUETIAPINE FUMARATE 400 MG: 200 TABLET ORAL at 09:05

## 2022-05-26 RX ADMIN — ALPRAZOLAM 1 MG: 0.5 TABLET ORAL at 06:05

## 2022-05-26 NOTE — PROGRESS NOTES
[facility] - Intensive Care ([address] Medicine  Progress Note    Patient Name: Nicole Collins  MRN: [id number]  Patient Class: IP- Inpatient   Admission Date: 5/23/2022  Length of Stay: 2 days  Attending Physician: Daisy Diaz MD  Primary Care Provider: Sofi Kennedy MD        Subjective:     Principal Problem:Eczematous dermatitis        HPI:  Nicole Collins is a 41 y.o. female with a PMH  has a past medical history of Asthma, Bipolar 1 disorder, Cocaine abuse, DM mellitus, gestational, ETOH abuse, Hypertension, Methamphetamine abuse, Opiate overdose, Seizures, and Trichomonas infection. who presented to the ED complaining of worsening swelling, erythema, and itching of her lower extremities.  Patient initially presented to the ED earlier this month for similar complaints and was transferred to [facility] at which point she was diagnosed with atopic dermatitis, contact dermatitis, with underlying component of cellulitis.  Patient was evaluated by Dermatology who recommended triamcinolone cream, p.o. antibiotics keflex and doxycycline, steroid cream, p.o. Benadryl, extensive skin care regimen, and outpatient follow-up with Dermatology Clinic at [facility].  Since being discharge back on 05/9/2022, patient reported that her rash on her back, torso, and upper extremities have improved but has had worsening swelling, erythema, and persistent itching throughout her extremities with lower being greater than upper.  Patient reported completion of antibiotics in addition to compliance with skin care regimen set forth by Dermatology but continues to endorse symptoms as noted above. She reports she has not yet followed up with Dermatology outpatient as directed and has had no relief from over the counter medications.  She denied endorse any fever, chills, sweats, nausea, vomiting, chest pain, shortness of breath, abdominal pain, changes in bowel/bladder habits, or neurological deficits and reported all  other review of systems negative except those noted above.  ED staff attempted to have patient transferred to [facility] to be re-evaluated by Dermatology but is currently on diversion is no pets are available.  Patient being admitted to Hospital Medicine for observation and continue medical management while awaiting transfer in the morning to [facility] for high-level dermatological care.    PCP: Sofi Kennedy        Overview/Hospital Course:  Ms Nicole Collins was admitted with diffuse erythematous pruritic blanching macular rash involving the palms and soles but sparing mucous membranes. She has previously been treated for cellulitis with PO Keflex and Doxy x 10 and has been treated with triamcinolone ointment for suspected atopic dermatitis. Despite this, her rash has worsened, becoming more widespread. Continued triamcinolone ointment upon admission. Pending transfer to [facility] for Dermatology services.       Interval History: Resting in the chair and on the phone. No acute complaints at this time    Objective:     Vital Signs (Most Recent):  Temp: 98.3 °F (36.8 °C) (05/26/22 1526)  Pulse: 79 (05/26/22 1526)  Resp: 20 (05/26/22 1526)  BP: (!) 141/75 (05/26/22 1526)  SpO2: 97 % (05/26/22 1526)   Vital Signs (24h Range):  Temp:  [97.9 °F (36.6 °C)-98.4 °F (36.9 °C)] 98.3 °F (36.8 °C)  Pulse:  [78-90] 79  Resp:  [16-20] 20  SpO2:  [96 %-99 %] 97 %  BP: ()/(53-84) 141/75     Weight: 89.2 kg (196 lb 10.4 oz)  Body mass index is 31.74 kg/m².    Intake/Output Summary (Last 24 hours) at 5/26/2022 1733  Last data filed at 5/26/2022 1718  Gross per 24 hour   Intake 340 ml   Output 800 ml   Net -460 ml        Physical Exam  Vitals and nursing note reviewed.   Constitutional:       General: She is not in acute distress.     Appearance: She is obese. She is not toxic-appearing.   HENT:      Head: Normocephalic and atraumatic.      Nose: Nose normal.      Mouth/Throat:      Mouth: Mucous membranes are moist.    Cardiovascular:      Rate and Rhythm: Normal rate and regular rhythm.      Pulses: Normal pulses.      Heart sounds: Normal heart sounds. No murmur heard.    No gallop.   Pulmonary:      Effort: Pulmonary effort is normal. No respiratory distress.      Breath sounds: Normal breath sounds. No wheezing or rales.      Comments: Room air  Abdominal:      General: Bowel sounds are normal. There is no distension.      Palpations: Abdomen is soft.      Tenderness: There is no abdominal tenderness. There is no guarding.   Musculoskeletal:      Right lower leg: Edema present.      Left lower leg: Edema present.   Skin:     Findings: Erythema, lesion and rash present.      Comments: Macular rash on face, trunk, arms, legs   Neurological:      Mental Status: She is alert. Mental status is at baseline.       Significant Labs: All pertinent labs within the past 24 hours have been reviewed.    Significant Imaging: I have reviewed all pertinent imaging results/findings within the past 24 hours.      Assessment/Plan:      * Eczematous dermatitis  Dermatology previously saw the patient and recommended triamcinolone ointment and cream. Patient states that the rash is worsening despite this intervention. She was also previously treated with antibiotics for presumed cellulitis. She has no current signs of infection. She is taking HCTZ and phenytoin, both which can be associated with rash. She does not have mucous membrane involvement. Doubt SJS/TEN/DRESS. No liver/kidney dysfunction.   - continue triamcinolone  - pending transfer to [facility] for Dermatology consult. Called on 5/25 in AM- they are on diversion. Will call again this afternoon.     Derm following, appreciate recs:    Patient with reported 1 month history of worsening pruritic rash with associated edema of the extremities, not improved after antibiotic course from last hospital admission. Per chart review, has been seen in ED multiple times over past 2-3 years for similar  presentation of rash. Has not been applying any topical steroids. No mucous membrane involvement. No new medications or supplements. Afebrile, labs wnl. DDx includes atopic dermatitis vs drug eruption vs allergic contact dermatitis vs CTCL vs malignancy vs psoriasis vs other  - Start triamcinolone 0.1% ointment BID to affected areas on trunk, arms, and legs. Do not use on face, armpits, or groin. Please supply patient with 454g jar at bedside  - Continue regular emollient use at least twice daily with vaseline or Eucerin  - Start hydroxyzine 25 mg TID prn for itching  - Punch biopsy of the right upper arm performed today, as below  - Gelfoam used, no suture removal needed  - Will follow up results  - If no improvement with topical steroids over next few days, consider starting po prednisone  - Patient still needs outpatient follow up at [facility], please notify dermatology prior to discharge to help set up an appt    Hypokalemia  Replace and monitor  Resolved.       Thrombocytopenia  Noted on admit, now resolved.      Macrocytosis  Noted      Class 1 obesity due to excess calories with serious comorbidity and body mass index (BMI) of 30.0 to 30.9 in adult  Body mass index is 32.6 kg/m².  Weight loss as outpatient        Gastroesophageal reflux disease without esophagitis  Chronic. Stable. Currently asymptomatic. Home medications include PPI/Antacids as needed.  Plan:  -Continue H2 blocker      Anxiety and depression  Continue home [facility]  Spiritual care has visited the patient         Erythroderma        Bipolar 1 disorder  Chronic. Stable. Not in acute exacerbation and currently denies endorsing any suicidal/homicidal ideations.   - continue home seroquel      HTN (hypertension)  Continue HCTZ, amlodipine       Seizure  Chronic. Stable on home medications.  -continue dilantin   -seizure precautions         VTE Risk Mitigation (From admission, onward)         Ordered     IP VTE HIGH RISK PATIENT  Once         05/24/22  0213     Place sequential compression device  Until discontinued         05/24/22 0213                Discharge Planning   CHRISTOPHER:      Code Status: Full Code   Is the patient medically ready for discharge?:     Reason for patient still in hospital (select all that apply): Treatment  Discharge Plan A: Home with family                  Daisy Diaz MD  Department of Hospital Medicine   [facility] - Intensive Care ([place])

## 2022-05-26 NOTE — PLAN OF CARE
Problem: Adult Inpatient Plan of Care  Goal: Plan of Care Review  5/26/2022 1806 by Yaquelin Glass RN  Outcome: Ongoing, Not Progressing  Goal: Optimal Comfort and Wellbeing  5/26/2022 1806 by Yaquelin Glass RN  Outcome: Ongoing, Not Progressing    Problem: Skin Injury Risk Increased  Goal: Skin Health and Integrity  Outcome: Ongoing, Not Progressing       AAOx4. Moderate generalized swelling; diuretic in place. Continues with pain management and topical cream for pruritus. Skin biospy to RUE completed by derm at bedside, doris cdi.

## 2022-05-26 NOTE — NURSING
Per Pt, sister will bring home medication list this morning. Not able to recall medication she is currently on.

## 2022-05-26 NOTE — CONSULTS
[facility] ([place] [address])  Dermatology  Consult Note    Patient Name: Nicole Collins  MRN: [id number]  Admission Date: 2022  Hospital Length of Stay: 2 days  Attending Physician: Daisy Diaz MD  Primary Care Provider: Sofi Kennedy MD     Inpatient consult to Dermatology  Consult performed by: Sweetie Franco MD  Consult ordered by: Dung Gonzalez MD        Subjective:     Principal Problem:Eczematous dermatitis    HPI:  42 yo F with a history of HTN, bipolar disease, drug abuse, seizures, and seasonal allergies presented to the ED 2 days ago with concerns for an itchy rash and swollen extremities. Patient was recently admitted earlier this month for similar problem and was diagnosed with cellulitis of the lower extremities with possible atopic dermatitis vs contact dermatitis. She was discharged on po doxycycline and keflex which she reports completing without issue but her rash persisted. Patient reports the rash initially started about one month ago, during which she was under significant stress as her children had just [place] in the [place]. She reports the only change to medications prior to onset of rash was a dose increase to one of her blood pressure medications but otherwise she has been taking all of the same medications for years. She did also change laundry detergents but has since switched back to original brand she was using without improvement. Has only been applying vaseline and eucerin to skin.      Past Medical History:   Diagnosis Date    Asthma     Bipolar 1 disorder     Cocaine abuse     DM mellitus, gestational     ETOH abuse     Hypertension     Methamphetamine abuse     Opiate overdose     Seizures     Trichomonas infection        Past Surgical History:   Procedure Laterality Date     SECTION, LOW TRANSVERSE      x 3    ECTOPIC PREGNANCY SURGERY      KNEE SURGERY  2010    left knee    SALPINGECTOMY      THYROID SURGERY      TUBAL  "LIGATION       Family History       Problem Relation (Age of Onset)    Cancer Mother, Father, Maternal Grandmother, Maternal Grandfather    Heart disease Mother          Tobacco Use    Smoking status: Current Some Day Smoker     Types: Cigars, Cigarettes    Smokeless tobacco: Never Used    Tobacco comment: Patient states she smokes 1 cigar per week.   Substance and Sexual Activity    Alcohol use: Yes     Comment: binge drinker    Drug use: Yes     Types: Marijuana, "Crack" cocaine, MDMA (Ecstacy), Amphetamines, Cocaine, Methamphetamines, Heroin    Sexual activity: Not Currently       Review of patient's allergies indicates:   Allergen Reactions    Tangerine Anaphylaxis    Sweet orange(citrus sinensis) Hives    Oranges [orange]        Medications:  Continuous Infusions:  Scheduled Meds:   amLODIPine  10 mg Oral Daily    cetirizine  10 mg Oral Daily    EScitalopram oxalate  20 mg Oral QAM    famotidine  20 mg Oral BID    hydroCHLOROthiazide  25 mg Oral Daily    phenytoin  100 mg Oral TID    QUEtiapine  400 mg Oral QHS    senna-docusate 8.6-50 mg  1 tablet Oral BID    triamcinolone acetonide 0.1%   Topical (Top) BID     PRN Meds:acetaminophen, acetaminophen, albuterol-ipratropium, ALPRAZolam, aluminum-magnesium hydroxide-simethicone, dextrose 10%, dextrose 10%, diphenhydrAMINE, glucagon (human recombinant), glucose, glucose, HYDROcodone-acetaminophen, melatonin, morphine, naloxone, simethicone, sodium chloride 0.9%    Review of Systems   Constitutional: Negative.  Negative for fever and chills.   Gastrointestinal:  Positive for diarrhea. Negative for nausea and vomiting.   Skin:  Positive for itching and rash.   Objective:     Vital Signs (Most Recent):  Temp: 98.4 °F (36.9 °C) (05/26/22 1134)  Pulse: 81 (05/26/22 1134)  Resp: 18 (05/26/22 1134)  BP: (!) 156/79 (05/26/22 1134)  SpO2: 96 % (05/26/22 1134)   Vital Signs (24h Range):  Temp:  [97.9 °F (36.6 °C)-98.5 °F (36.9 °C)] 98.4 °F (36.9 °C)  Pulse: "  [78-90] 81  Resp:  [16-20] 18  SpO2:  [96 %-100 %] 96 %  BP: ()/(53-84) 156/79     Weight: 89.2 kg (196 lb 10.4 oz)  Body mass index is 31.74 kg/m².    Physical Exam   Constitutional: She appears well-developed and well-nourished.   Neurological: She is alert and oriented to person, place, and time.   Skin:                        Significant Labs: CBC:   Recent Labs   Lab 05/25/22  0958   WBC 6.32   HGB 12.7   HCT 39.6        CMP:   Recent Labs   Lab 05/25/22  0958      K 3.8      CO2 23   GLU 89   BUN 7   CREATININE 1.1   CALCIUM 9.4   PROT 7.4   ALBUMIN 3.5   BILITOT 0.5   ALKPHOS 59   AST 33   ALT 28   ANIONGAP 10   EGFRNONAA >60       Significant Imaging: I have reviewed all pertinent imaging results/findings within the past 24 hours.      Assessment/Plan:     Erythroderma  Patient with reported 1 month history of worsening pruritic rash with associated edema of the extremities, not improved after antibiotic course from last hospital admission. Per chart review, has been seen in ED multiple times over past 2-3 years for similar presentation of rash. Has not been applying any topical steroids. No mucous membrane involvement. No new medications or supplements. Afebrile, labs wnl. DDx includes atopic dermatitis vs drug eruption vs allergic contact dermatitis vs CTCL vs malignancy vs psoriasis vs other  - Start triamcinolone 0.1% ointment BID to affected areas on trunk, arms, and legs. Do not use on face, armpits, or groin. Please supply patient with 454g jar at bedside  - Continue regular emollient use at least twice daily with vaseline or Eucerin  - Start hydroxyzine 25 mg TID prn for itching  - Punch biopsy of the right upper arm performed today, as below  - Gelfoam used, no suture removal needed  - Will follow up results  - If no improvement with topical steroids over next few days, consider starting po prednisone  - Patient still needs outpatient follow up at [facility], please notify  dermatology prior to discharge to help set up an appt  - All other recommendations per primary team    PUNCH BIOPSY PROCEDURE:  After informed written consent was obtained, using alcohol for cleansing and 1% Lidocaine without epinephrine for anesthetic, with sterile technique a 4 mm punch biopsy was used to obtain a biopsy specimen of the right arm. Hemostasis was obtained by pressure and wound was closed with surgical gelfoam. Vaseline and dressing applied, and wound care instructions provided. Be alert for any signs of cutaneous infection. The specimen is labeled and sent to pathology for evaluation. The procedure was well tolerated without complications.            Thank you for your consult. I will follow-up with patient. Please contact us if you have any additional questions.    Sweetie Franco MD  Dermatology  [facility] ([place]

## 2022-05-26 NOTE — ASSESSMENT & PLAN NOTE
Patient with reported 1 month history of worsening pruritic rash with associated edema of the extremities, not improved after antibiotic course from last hospital admission. Per chart review, has been seen in ED multiple times over past 2-3 years for similar presentation of rash. Has not been applying any topical steroids. No mucous membrane involvement. No new medications or supplements. Afebrile, labs wnl. DDx includes atopic dermatitis vs drug eruption vs allergic contact dermatitis vs CTCL vs malignancy vs psoriasis vs other  - Start triamcinolone 0.1% ointment BID to affected areas on trunk, arms, and legs. Do not use on face, armpits, or groin. Please supply patient with 454g jar at bedside  - Continue regular emollient use at least twice daily with vaseline or Eucerin  - Start hydroxyzine 25 mg TID prn for itching  - Punch biopsy of the right upper arm performed today, as below  - Gelfoam used, no suture removal needed  - Will follow up results  - If no improvement with topical steroids over next few days, consider starting po prednisone  - Patient still needs outpatient follow up at [facility], please notify dermatology prior to discharge to help set up an appt  - All other recommendations per primary team    PUNCH BIOPSY PROCEDURE:  After informed written consent was obtained, using alcohol for cleansing and 1% Lidocaine without epinephrine for anesthetic, with sterile technique a 4 mm punch biopsy was used to obtain a biopsy specimen of the right arm. Hemostasis was obtained by pressure and wound was closed with surgical gelfoam. Vaseline and dressing applied, and wound care instructions provided. Be alert for any signs of cutaneous infection. The specimen is labeled and sent to pathology for evaluation. The procedure was well tolerated without complications.

## 2022-05-26 NOTE — SUBJECTIVE & OBJECTIVE
Interval History: Resting in the chair and on the phone. No acute complaints at this time    Objective:     Vital Signs (Most Recent):  Temp: 98.3 °F (36.8 °C) (05/26/22 1526)  Pulse: 79 (05/26/22 1526)  Resp: 20 (05/26/22 1526)  BP: (!) 141/75 (05/26/22 1526)  SpO2: 97 % (05/26/22 1526)   Vital Signs (24h Range):  Temp:  [97.9 °F (36.6 °C)-98.4 °F (36.9 °C)] 98.3 °F (36.8 °C)  Pulse:  [78-90] 79  Resp:  [16-20] 20  SpO2:  [96 %-99 %] 97 %  BP: ()/(53-84) 141/75     Weight: 89.2 kg (196 lb 10.4 oz)  Body mass index is 31.74 kg/m².    Intake/Output Summary (Last 24 hours) at 5/26/2022 1733  Last data filed at 5/26/2022 1718  Gross per 24 hour   Intake 340 ml   Output 800 ml   Net -460 ml        Physical Exam  Vitals and nursing note reviewed.   Constitutional:       General: She is not in acute distress.     Appearance: She is obese. She is not toxic-appearing.   HENT:      Head: Normocephalic and atraumatic.      Nose: Nose normal.      Mouth/Throat:      Mouth: Mucous membranes are moist.   Cardiovascular:      Rate and Rhythm: Normal rate and regular rhythm.      Pulses: Normal pulses.      Heart sounds: Normal heart sounds. No murmur heard.    No gallop.   Pulmonary:      Effort: Pulmonary effort is normal. No respiratory distress.      Breath sounds: Normal breath sounds. No wheezing or rales.      Comments: Room air  Abdominal:      General: Bowel sounds are normal. There is no distension.      Palpations: Abdomen is soft.      Tenderness: There is no abdominal tenderness. There is no guarding.   Musculoskeletal:      Right lower leg: Edema present.      Left lower leg: Edema present.   Skin:     Findings: Erythema, lesion and rash present.      Comments: Macular rash on face, trunk, arms, legs   Neurological:      Mental Status: She is alert. Mental status is at baseline.       Significant Labs: All pertinent labs within the past 24 hours have been reviewed.    Significant Imaging: I have reviewed all  pertinent imaging results/findings within the past 24 hours.

## 2022-05-26 NOTE — HPI
42 yo F with a history of HTN, bipolar disease, drug abuse, seizures, and seasonal allergies presented to the ED 2 days ago with concerns for an itchy rash and swollen extremities. Patient was recently admitted earlier this month for similar problem and was diagnosed with cellulitis of the lower extremities with possible atopic dermatitis vs contact dermatitis. She was discharged on po doxycycline and keflex which she reports completing without issue but her rash persisted. Patient reports the rash initially started about one month ago, during which she was under significant stress as her children had just [place] the [place]. She reports the only change to medications prior to onset of rash was a dose increase to one of her blood pressure medications but otherwise she has been taking all of the same medications for years. She did also change laundry detergents but has since switched back to original brand she was using without improvement. Has only been applying vaseline and eucerin to skin.

## 2022-05-26 NOTE — ASSESSMENT & PLAN NOTE
Dermatology previously saw the patient and recommended triamcinolone ointment and cream. Patient states that the rash is worsening despite this intervention. She was also previously treated with antibiotics for presumed cellulitis. She has no current signs of infection. She is taking HCTZ and phenytoin, both which can be associated with rash. She does not have mucous membrane involvement. Doubt SJS/TEN/DRESS. No liver/kidney dysfunction.   - continue triamcinolone  - pending transfer to [facility] for Dermatology consult. Called on 5/25 in AM- they are on diversion. Will call again this afternoon.     Derm following, appreciate recs:    Patient with reported 1 month history of worsening pruritic rash with associated edema of the extremities, not improved after antibiotic course from last hospital admission. Per chart review, has been seen in ED multiple times over past 2-3 years for similar presentation of rash. Has not been applying any topical steroids. No mucous membrane involvement. No new medications or supplements. Afebrile, labs wnl. DDx includes atopic dermatitis vs drug eruption vs allergic contact dermatitis vs CTCL vs malignancy vs psoriasis vs other  - Start triamcinolone 0.1% ointment BID to affected areas on trunk, arms, and legs. Do not use on face, armpits, or groin. Please supply patient with 454g jar at bedside  - Continue regular emollient use at least twice daily with vaseline or Eucerin  - Start hydroxyzine 25 mg TID prn for itching  - Punch biopsy of the right upper arm performed today, as below  - Gelfoam used, no suture removal needed  - Will follow up results  - If no improvement with topical steroids over next few days, consider starting po prednisone  - Patient still needs outpatient follow up at [facility], please notify dermatology prior to discharge to help set up an appt

## 2022-05-26 NOTE — SUBJECTIVE & OBJECTIVE
"Past Medical History:   Diagnosis Date    Asthma     Bipolar 1 disorder     Cocaine abuse     DM mellitus, gestational     ETOH abuse     Hypertension     Methamphetamine abuse     Opiate overdose     Seizures     Trichomonas infection        Past Surgical History:   Procedure Laterality Date     SECTION, LOW TRANSVERSE      x 3    ECTOPIC PREGNANCY SURGERY      KNEE SURGERY  2010    left knee    SALPINGECTOMY      THYROID SURGERY      TUBAL LIGATION       Family History       Problem Relation (Age of Onset)    Cancer Mother, Father, Maternal Grandmother, Maternal Grandfather    Heart disease Mother          Tobacco Use    Smoking status: Current Some Day Smoker     Types: Cigars, Cigarettes    Smokeless tobacco: Never Used    Tobacco comment: Patient states she smokes 1 cigar per week.   Substance and Sexual Activity    Alcohol use: Yes     Comment: binge drinker    Drug use: Yes     Types: Marijuana, "Crack" cocaine, MDMA (Ecstacy), Amphetamines, Cocaine, Methamphetamines, Heroin    Sexual activity: Not Currently       Review of patient's allergies indicates:   Allergen Reactions    Tangerine Anaphylaxis    Sweet orange(citrus sinensis) Hives    Oranges [orange]        Medications:  Continuous Infusions:  Scheduled Meds:   amLODIPine  10 mg Oral Daily    cetirizine  10 mg Oral Daily    EScitalopram oxalate  20 mg Oral QAM    famotidine  20 mg Oral BID    hydroCHLOROthiazide  25 mg Oral Daily    phenytoin  100 mg Oral TID    QUEtiapine  400 mg Oral QHS    senna-docusate 8.6-50 mg  1 tablet Oral BID    triamcinolone acetonide 0.1%   Topical (Top) BID     PRN Meds:acetaminophen, acetaminophen, albuterol-ipratropium, ALPRAZolam, aluminum-magnesium hydroxide-simethicone, dextrose 10%, dextrose 10%, diphenhydrAMINE, glucagon (human recombinant), glucose, glucose, HYDROcodone-acetaminophen, melatonin, morphine, naloxone, simethicone, sodium chloride 0.9%    Review of Systems   Constitutional: Negative.  Negative " for fever and chills.   Gastrointestinal:  Positive for diarrhea. Negative for nausea and vomiting.   Skin:  Positive for itching and rash.   Objective:     Vital Signs (Most Recent):  Temp: 98.4 °F (36.9 °C) (05/26/22 1134)  Pulse: 81 (05/26/22 1134)  Resp: 18 (05/26/22 1134)  BP: (!) 156/79 (05/26/22 1134)  SpO2: 96 % (05/26/22 1134)   Vital Signs (24h Range):  Temp:  [97.9 °F (36.6 °C)-98.5 °F (36.9 °C)] 98.4 °F (36.9 °C)  Pulse:  [78-90] 81  Resp:  [16-20] 18  SpO2:  [96 %-100 %] 96 %  BP: ()/(53-84) 156/79     Weight: 89.2 kg (196 lb 10.4 oz)  Body mass index is 31.74 kg/m².    Physical Exam   Constitutional: She appears well-developed and well-nourished.   Neurological: She is alert and oriented to person, place, and time.   Skin:            Significant Labs: CBC:   Recent Labs   Lab 05/25/22  0958   WBC 6.32   HGB 12.7   HCT 39.6        CMP:   Recent Labs   Lab 05/25/22  0958      K 3.8      CO2 23   GLU 89   BUN 7   CREATININE 1.1   CALCIUM 9.4   PROT 7.4   ALBUMIN 3.5   BILITOT 0.5   ALKPHOS 59   AST 33   ALT 28   ANIONGAP 10   EGFRNONAA >60       Significant Imaging: I have reviewed all pertinent imaging results/findings within the past 24 hours.

## 2022-05-27 LAB
ALBUMIN SERPL BCP-MCNC: 3.3 G/DL (ref 3.5–5.2)
ALP SERPL-CCNC: 54 U/L (ref 55–135)
ALT SERPL W/O P-5'-P-CCNC: 22 U/L (ref 10–44)
ANION GAP SERPL CALC-SCNC: 8 MMOL/L (ref 8–16)
AST SERPL-CCNC: 25 U/L (ref 10–40)
BASOPHILS # BLD AUTO: 0.02 K/UL (ref 0–0.2)
BASOPHILS NFR BLD: 0.4 % (ref 0–1.9)
BILIRUB SERPL-MCNC: 0.3 MG/DL (ref 0.1–1)
BUN SERPL-MCNC: 10 MG/DL (ref 6–20)
CALCIUM SERPL-MCNC: 9.2 MG/DL (ref 8.7–10.5)
CHLORIDE SERPL-SCNC: 104 MMOL/L (ref 95–110)
CO2 SERPL-SCNC: 24 MMOL/L (ref 23–29)
CREAT SERPL-MCNC: 0.8 MG/DL (ref 0.5–1.4)
DIFFERENTIAL METHOD: ABNORMAL
EOSINOPHIL # BLD AUTO: 0.2 K/UL (ref 0–0.5)
EOSINOPHIL NFR BLD: 3.1 % (ref 0–8)
ERYTHROCYTE [DISTWIDTH] IN BLOOD BY AUTOMATED COUNT: 12.2 % (ref 11.5–14.5)
EST. GFR  (AFRICAN AMERICAN): >60 ML/MIN/1.73 M^2
EST. GFR  (NON AFRICAN AMERICAN): >60 ML/MIN/1.73 M^2
GLUCOSE SERPL-MCNC: 89 MG/DL (ref 70–110)
HCT VFR BLD AUTO: 37.7 % (ref 37–48.5)
HGB BLD-MCNC: 12.4 G/DL (ref 12–16)
IMM GRANULOCYTES # BLD AUTO: 0.02 K/UL (ref 0–0.04)
IMM GRANULOCYTES NFR BLD AUTO: 0.4 % (ref 0–0.5)
LYMPHOCYTES # BLD AUTO: 1.8 K/UL (ref 1–4.8)
LYMPHOCYTES NFR BLD: 31.9 % (ref 18–48)
MCH RBC QN AUTO: 32.3 PG (ref 27–31)
MCHC RBC AUTO-ENTMCNC: 32.9 G/DL (ref 32–36)
MCV RBC AUTO: 98 FL (ref 82–98)
MONOCYTES # BLD AUTO: 0.7 K/UL (ref 0.3–1)
MONOCYTES NFR BLD: 11.8 % (ref 4–15)
NEUTROPHILS # BLD AUTO: 2.9 K/UL (ref 1.8–7.7)
NEUTROPHILS NFR BLD: 52.4 % (ref 38–73)
NRBC BLD-RTO: 0 /100 WBC
PLATELET # BLD AUTO: 310 K/UL (ref 150–450)
PMV BLD AUTO: 9.8 FL (ref 9.2–12.9)
POTASSIUM SERPL-SCNC: 4 MMOL/L (ref 3.5–5.1)
PROT SERPL-MCNC: 6.9 G/DL (ref 6–8.4)
RBC # BLD AUTO: 3.84 M/UL (ref 4–5.4)
SODIUM SERPL-SCNC: 136 MMOL/L (ref 136–145)
WBC # BLD AUTO: 5.52 K/UL (ref 3.9–12.7)

## 2022-05-27 PROCEDURE — 80053 COMPREHEN METABOLIC PANEL: CPT | Performed by: STUDENT IN AN ORGANIZED HEALTH CARE EDUCATION/TRAINING PROGRAM

## 2022-05-27 PROCEDURE — 12000002 HC ACUTE/MED SURGE SEMI-PRIVATE ROOM

## 2022-05-27 PROCEDURE — 99233 PR SUBSEQUENT HOSPITAL CARE,LEVL III: ICD-10-PCS | Mod: ,,, | Performed by: STUDENT IN AN ORGANIZED HEALTH CARE EDUCATION/TRAINING PROGRAM

## 2022-05-27 PROCEDURE — 99233 SBSQ HOSP IP/OBS HIGH 50: CPT | Mod: ,,, | Performed by: STUDENT IN AN ORGANIZED HEALTH CARE EDUCATION/TRAINING PROGRAM

## 2022-05-27 PROCEDURE — 25000003 PHARM REV CODE 250: Performed by: HOSPITALIST

## 2022-05-27 PROCEDURE — 25000003 PHARM REV CODE 250: Performed by: STUDENT IN AN ORGANIZED HEALTH CARE EDUCATION/TRAINING PROGRAM

## 2022-05-27 PROCEDURE — 36415 COLL VENOUS BLD VENIPUNCTURE: CPT | Performed by: STUDENT IN AN ORGANIZED HEALTH CARE EDUCATION/TRAINING PROGRAM

## 2022-05-27 PROCEDURE — 85025 COMPLETE CBC W/AUTO DIFF WBC: CPT | Performed by: STUDENT IN AN ORGANIZED HEALTH CARE EDUCATION/TRAINING PROGRAM

## 2022-05-27 RX ADMIN — HYDROCODONE BITARTRATE AND ACETAMINOPHEN 1 TABLET: 5; 325 TABLET ORAL at 06:05

## 2022-05-27 RX ADMIN — PHENYTOIN SODIUM 100 MG: 100 CAPSULE ORAL at 04:05

## 2022-05-27 RX ADMIN — TRIAMCINOLONE ACETONIDE: 1 OINTMENT TOPICAL at 10:05

## 2022-05-27 RX ADMIN — PHENYTOIN SODIUM 100 MG: 100 CAPSULE ORAL at 10:05

## 2022-05-27 RX ADMIN — TRIAMCINOLONE ACETONIDE: 1 OINTMENT TOPICAL at 09:05

## 2022-05-27 RX ADMIN — ALPRAZOLAM 1 MG: 0.5 TABLET ORAL at 01:05

## 2022-05-27 RX ADMIN — AMLODIPINE BESYLATE 10 MG: 10 TABLET ORAL at 09:05

## 2022-05-27 RX ADMIN — HYDROCHLOROTHIAZIDE 25 MG: 12.5 TABLET ORAL at 09:05

## 2022-05-27 RX ADMIN — ESCITALOPRAM OXALATE 20 MG: 10 TABLET ORAL at 06:05

## 2022-05-27 RX ADMIN — SENNOSIDES AND DOCUSATE SODIUM 1 TABLET: 50; 8.6 TABLET ORAL at 10:05

## 2022-05-27 RX ADMIN — FAMOTIDINE 20 MG: 20 TABLET ORAL at 10:05

## 2022-05-27 RX ADMIN — CETIRIZINE HYDROCHLORIDE 10 MG: 10 TABLET, FILM COATED ORAL at 09:05

## 2022-05-27 RX ADMIN — HYDROXYZINE PAMOATE 25 MG: 25 CAPSULE ORAL at 10:05

## 2022-05-27 RX ADMIN — QUETIAPINE FUMARATE 400 MG: 200 TABLET ORAL at 10:05

## 2022-05-27 RX ADMIN — FAMOTIDINE 20 MG: 20 TABLET ORAL at 09:05

## 2022-05-27 RX ADMIN — PHENYTOIN SODIUM 100 MG: 100 CAPSULE ORAL at 09:05

## 2022-05-27 RX ADMIN — SENNOSIDES AND DOCUSATE SODIUM 1 TABLET: 50; 8.6 TABLET ORAL at 09:05

## 2022-05-27 NOTE — PLAN OF CARE
[facility] ([place] [address])  Discharge Assessment    Primary Care Provider: Sofi Kennedy MD     Principal Problem:Eczematous dermatitis      Discharge Planning   CHRISTOPHER:  5/28/22    Code Status: Full Code   Is the patient medically ready for discharge?:     Reason for patient still in hospital (select all that apply): Treatment  Discharge Plan A: Home with family                          Yazmin RN Case Manager  [phone]

## 2022-05-27 NOTE — PLAN OF CARE
Problem: Adult Inpatient Plan of Care  Goal: Plan of Care Review  Outcome: Ongoing, Not Progressing  Goal: Optimal Comfort and Wellbeing  Outcome: Ongoing, Not Progressing     Problem: Impaired Wound Healing  Goal: Optimal Wound Healing  Outcome: Ongoing, Not Progressing       AAOx4. Continue with pruritus and pain management; verbalize improvement. No acute issues present.

## 2022-05-28 LAB
ALBUMIN SERPL BCP-MCNC: 3.5 G/DL (ref 3.5–5.2)
ALP SERPL-CCNC: 57 U/L (ref 55–135)
ALT SERPL W/O P-5'-P-CCNC: 23 U/L (ref 10–44)
ANION GAP SERPL CALC-SCNC: 11 MMOL/L (ref 8–16)
ANISOCYTOSIS BLD QL SMEAR: SLIGHT
AST SERPL-CCNC: 30 U/L (ref 10–40)
BASOPHILS # BLD AUTO: 0.03 K/UL (ref 0–0.2)
BASOPHILS NFR BLD: 0.5 % (ref 0–1.9)
BILIRUB SERPL-MCNC: 0.3 MG/DL (ref 0.1–1)
BUN SERPL-MCNC: 10 MG/DL (ref 6–20)
CALCIUM SERPL-MCNC: 9.6 MG/DL (ref 8.7–10.5)
CHLORIDE SERPL-SCNC: 104 MMOL/L (ref 95–110)
CO2 SERPL-SCNC: 19 MMOL/L (ref 23–29)
CREAT SERPL-MCNC: 0.8 MG/DL (ref 0.5–1.4)
DIFFERENTIAL METHOD: ABNORMAL
EOSINOPHIL # BLD AUTO: 0.2 K/UL (ref 0–0.5)
EOSINOPHIL NFR BLD: 3.2 % (ref 0–8)
ERYTHROCYTE [DISTWIDTH] IN BLOOD BY AUTOMATED COUNT: 12 % (ref 11.5–14.5)
EST. GFR  (AFRICAN AMERICAN): >60 ML/MIN/1.73 M^2
EST. GFR  (NON AFRICAN AMERICAN): >60 ML/MIN/1.73 M^2
GLUCOSE SERPL-MCNC: 81 MG/DL (ref 70–110)
HCT VFR BLD AUTO: 39.9 % (ref 37–48.5)
HGB BLD-MCNC: 13.3 G/DL (ref 12–16)
HYPOCHROMIA BLD QL SMEAR: ABNORMAL
IMM GRANULOCYTES # BLD AUTO: 0.06 K/UL (ref 0–0.04)
IMM GRANULOCYTES NFR BLD AUTO: 0.9 % (ref 0–0.5)
LYMPHOCYTES # BLD AUTO: 2.2 K/UL (ref 1–4.8)
LYMPHOCYTES NFR BLD: 33.3 % (ref 18–48)
MCH RBC QN AUTO: 32.3 PG (ref 27–31)
MCHC RBC AUTO-ENTMCNC: 33.3 G/DL (ref 32–36)
MCV RBC AUTO: 97 FL (ref 82–98)
MONOCYTES # BLD AUTO: 0.7 K/UL (ref 0.3–1)
MONOCYTES NFR BLD: 11.2 % (ref 4–15)
NEUTROPHILS # BLD AUTO: 3.4 K/UL (ref 1.8–7.7)
NEUTROPHILS NFR BLD: 50.9 % (ref 38–73)
NRBC BLD-RTO: 0 /100 WBC
OVALOCYTES BLD QL SMEAR: ABNORMAL
PLATELET # BLD AUTO: 287 K/UL (ref 150–450)
PMV BLD AUTO: 10.4 FL (ref 9.2–12.9)
POIKILOCYTOSIS BLD QL SMEAR: SLIGHT
POLYCHROMASIA BLD QL SMEAR: ABNORMAL
POTASSIUM SERPL-SCNC: 4.4 MMOL/L (ref 3.5–5.1)
PROT SERPL-MCNC: 7.6 G/DL (ref 6–8.4)
RBC # BLD AUTO: 4.12 M/UL (ref 4–5.4)
SODIUM SERPL-SCNC: 134 MMOL/L (ref 136–145)
WBC # BLD AUTO: 6.63 K/UL (ref 3.9–12.7)

## 2022-05-28 PROCEDURE — 36415 COLL VENOUS BLD VENIPUNCTURE: CPT | Performed by: STUDENT IN AN ORGANIZED HEALTH CARE EDUCATION/TRAINING PROGRAM

## 2022-05-28 PROCEDURE — 80053 COMPREHEN METABOLIC PANEL: CPT | Performed by: STUDENT IN AN ORGANIZED HEALTH CARE EDUCATION/TRAINING PROGRAM

## 2022-05-28 PROCEDURE — 25000003 PHARM REV CODE 250: Performed by: HOSPITALIST

## 2022-05-28 PROCEDURE — 25000003 PHARM REV CODE 250: Performed by: STUDENT IN AN ORGANIZED HEALTH CARE EDUCATION/TRAINING PROGRAM

## 2022-05-28 PROCEDURE — 85025 COMPLETE CBC W/AUTO DIFF WBC: CPT | Performed by: STUDENT IN AN ORGANIZED HEALTH CARE EDUCATION/TRAINING PROGRAM

## 2022-05-28 PROCEDURE — 12000002 HC ACUTE/MED SURGE SEMI-PRIVATE ROOM

## 2022-05-28 RX ADMIN — ESCITALOPRAM OXALATE 20 MG: 10 TABLET ORAL at 06:05

## 2022-05-28 RX ADMIN — SENNOSIDES AND DOCUSATE SODIUM 1 TABLET: 50; 8.6 TABLET ORAL at 09:05

## 2022-05-28 RX ADMIN — FAMOTIDINE 20 MG: 20 TABLET ORAL at 09:05

## 2022-05-28 RX ADMIN — TRIAMCINOLONE ACETONIDE: 1 OINTMENT TOPICAL at 09:05

## 2022-05-28 RX ADMIN — PHENYTOIN SODIUM 100 MG: 100 CAPSULE ORAL at 03:05

## 2022-05-28 RX ADMIN — HYDROCODONE BITARTRATE AND ACETAMINOPHEN 1 TABLET: 5; 325 TABLET ORAL at 10:05

## 2022-05-28 RX ADMIN — HYDROCHLOROTHIAZIDE 25 MG: 12.5 TABLET ORAL at 09:05

## 2022-05-28 RX ADMIN — CETIRIZINE HYDROCHLORIDE 10 MG: 10 TABLET, FILM COATED ORAL at 09:05

## 2022-05-28 RX ADMIN — HYDROXYZINE PAMOATE 25 MG: 25 CAPSULE ORAL at 05:05

## 2022-05-28 RX ADMIN — PHENYTOIN SODIUM 100 MG: 100 CAPSULE ORAL at 09:05

## 2022-05-28 RX ADMIN — QUETIAPINE FUMARATE 400 MG: 200 TABLET ORAL at 09:05

## 2022-05-28 RX ADMIN — HYDROCODONE BITARTRATE AND ACETAMINOPHEN 1 TABLET: 5; 325 TABLET ORAL at 05:05

## 2022-05-28 RX ADMIN — AMLODIPINE BESYLATE 10 MG: 10 TABLET ORAL at 09:05

## 2022-05-28 NOTE — SUBJECTIVE & OBJECTIVE
Interval History: Resting in bed. Still in severe pain. Dermatology following appreciate recs. She does note some improvement however.    Objective:     Vital Signs (Most Recent):  Temp: 98 °F (36.7 °C) (05/27/22 2031)  Pulse: 77 (05/27/22 2031)  Resp: 16 (05/27/22 2031)  BP: (!) 171/92 (05/27/22 2031)  SpO2: 99 % (05/27/22 2031)   Vital Signs (24h Range):  Temp:  [98 °F (36.7 °C)-98.8 °F (37.1 °C)] 98 °F (36.7 °C)  Pulse:  [77-87] 77  Resp:  [16-20] 16  SpO2:  [93 %-100 %] 99 %  BP: (100-171)/(54-92) 171/92     Weight: 89.2 kg (196 lb 10.4 oz)  Body mass index is 31.74 kg/m².    Intake/Output Summary (Last 24 hours) at 5/27/2022 2303  Last data filed at 5/27/2022 0500  Gross per 24 hour   Intake --   Output 600 ml   Net -600 ml        Physical Exam  Vitals and nursing note reviewed.   Constitutional:       General: She is not in acute distress.     Appearance: She is obese. She is not toxic-appearing.   HENT:      Head: Normocephalic and atraumatic.      Nose: Nose normal.      Mouth/Throat:      Mouth: Mucous membranes are moist.   Cardiovascular:      Rate and Rhythm: Normal rate and regular rhythm.      Pulses: Normal pulses.      Heart sounds: Normal heart sounds. No murmur heard.    No gallop.   Pulmonary:      Effort: Pulmonary effort is normal. No respiratory distress.      Breath sounds: Normal breath sounds. No wheezing or rales.      Comments: Room air  Abdominal:      General: Bowel sounds are normal. There is no distension.      Palpations: Abdomen is soft.      Tenderness: There is no abdominal tenderness. There is no guarding.   Musculoskeletal:      Right lower leg: Edema present.      Left lower leg: Edema present.   Skin:     Findings: Erythema, lesion and rash present.      Comments: Macular rash on face, trunk, arms, legs   Neurological:      Mental Status: She is alert. Mental status is at baseline.       Significant Labs: All pertinent labs within the past 24 hours have been  reviewed.    Significant Imaging: I have reviewed all pertinent imaging results/findings within the past 24 hours.

## 2022-05-28 NOTE — PLAN OF CARE
Problem: Adult Inpatient Plan of Care  Goal: Plan of Care Review  Outcome: Ongoing, Progressing  Goal: Patient-Specific Goal (Individualized)  Outcome: Ongoing, Progressing  Goal: Absence of Hospital-Acquired Illness or Injury  Outcome: Ongoing, Progressing  Goal: Optimal Comfort and Wellbeing  Outcome: Ongoing, Progressing  Goal: Readiness for Transition of Care  Outcome: Ongoing, Progressing     Problem: Impaired Wound Healing  Goal: Optimal Wound Healing  Outcome: Ongoing, Progressing  Intervention: Promote Wound Healing  Flowsheets (Taken 5/28/2022 0747)  Oral Nutrition Promotion: social interaction promoted  Sleep/Rest Enhancement: relaxation techniques promoted  Pain Management Interventions: pain management plan reviewed with patient/caregiver     Problem: Skin Injury Risk Increased  Goal: Skin Health and Integrity  Outcome: Ongoing, Progressing  Intervention: Optimize Skin Protection  Flowsheets (Taken 5/28/2022 0747)  Skin Protection: adhesive use limited  Head of Bed (HOB) Positioning: HOB at 60 degrees  Intervention: Promote and Optimize Oral Intake  Flowsheets (Taken 5/28/2022 0747)  Oral Nutrition Promotion: social interaction promoted

## 2022-05-28 NOTE — PROGRESS NOTES
[facility] - Intensive Care ([address] Medicine  Progress Note    Patient Name: Nicole Collins  MRN: [id number]  Patient Class: IP- Inpatient   Admission Date: 5/23/2022  Length of Stay: 3 days  Attending Physician: Daisy Diaz MD  Primary Care Provider: Sofi Kennedy MD        Subjective:     Principal Problem:Eczematous dermatitis        HPI:  Nicole Collins is a 41 y.o. female with a PMH  has a past medical history of Asthma, Bipolar 1 disorder, Cocaine abuse, DM mellitus, gestational, ETOH abuse, Hypertension, Methamphetamine abuse, Opiate overdose, Seizures, and Trichomonas infection. who presented to the ED complaining of worsening swelling, erythema, and itching of her lower extremities.  Patient initially presented to the ED earlier this month for similar complaints and was transferred to [facility] at which point she was diagnosed with atopic dermatitis, contact dermatitis, with underlying component of cellulitis.  Patient was evaluated by Dermatology who recommended triamcinolone cream, p.o. antibiotics keflex and doxycycline, steroid cream, p.o. Benadryl, extensive skin care regimen, and outpatient follow-up with Dermatology Clinic at [facility].  Since being discharge back on 05/9/2022, patient reported that her rash on her back, torso, and upper extremities have improved but has had worsening swelling, erythema, and persistent itching throughout her extremities with lower being greater than upper.  Patient reported completion of antibiotics in addition to compliance with skin care regimen set forth by Dermatology but continues to endorse symptoms as noted above. She reports she has not yet followed up with Dermatology outpatient as directed and has had no relief from over the counter medications.  She denied endorse any fever, chills, sweats, nausea, vomiting, chest pain, shortness of breath, abdominal pain, changes in bowel/bladder habits, or neurological deficits and reported all  other review of systems negative except those noted above.  ED staff attempted to have patient transferred to [facility] to be re-evaluated by Dermatology but is currently on diversion is no pets are available.  Patient being admitted to Hospital Medicine for observation and continue medical management while awaiting transfer in the morning to [facility] for high-level dermatological care.    PCP: Sofi Kennedy        Overview/Hospital Course:  Ms Nicole Collins was admitted with diffuse erythematous pruritic blanching macular rash involving the palms and soles but sparing mucous membranes. She has previously been treated for cellulitis with PO Keflex and Doxy x 10 and has been treated with triamcinolone ointment for suspected atopic dermatitis. Despite this, her rash has worsened, becoming more widespread. Continued triamcinolone ointment upon admission. [place] [facility] [place].       Interval History: Resting in bed. Still in severe pain. Dermatology following appreciate recs. She does note some improvement however.    Objective:     Vital Signs (Most Recent):  Temp: 98 °F (36.7 °C) (05/27/22 2031)  Pulse: 77 (05/27/22 2031)  Resp: 16 (05/27/22 2031)  BP: (!) 171/92 (05/27/22 2031)  SpO2: 99 % (05/27/22 2031)   Vital Signs (24h Range):  Temp:  [98 °F (36.7 °C)-98.8 °F (37.1 °C)] 98 °F (36.7 °C)  Pulse:  [77-87] 77  Resp:  [16-20] 16  SpO2:  [93 %-100 %] 99 %  BP: (100-171)/(54-92) 171/92     Weight: 89.2 kg (196 lb 10.4 oz)  Body mass index is 31.74 kg/m².    Intake/Output Summary (Last 24 hours) at 5/27/2022 2303  Last data filed at 5/27/2022 0500  Gross per 24 hour   Intake --   Output 600 ml   Net -600 ml        Physical Exam  Vitals and nursing note reviewed.   Constitutional:       General: She is not in acute distress.     Appearance: She is obese. She is not toxic-appearing.   HENT:      Head: Normocephalic and atraumatic.      Nose: Nose normal.      Mouth/Throat:      Mouth:  Mucous membranes are moist.   Cardiovascular:      Rate and Rhythm: Normal rate and regular rhythm.      Pulses: Normal pulses.      Heart sounds: Normal heart sounds. No murmur heard.    No gallop.   Pulmonary:      Effort: Pulmonary effort is normal. No respiratory distress.      Breath sounds: Normal breath sounds. No wheezing or rales.      Comments: Room air  Abdominal:      General: Bowel sounds are normal. There is no distension.      Palpations: Abdomen is soft.      Tenderness: There is no abdominal tenderness. There is no guarding.   Musculoskeletal:      Right lower leg: Edema present.      Left lower leg: Edema present.   Skin:     Findings: Erythema, lesion and rash present.      Comments: Macular rash on face, trunk, arms, legs   Neurological:      Mental Status: She is alert. Mental status is at baseline.       Significant Labs: All pertinent labs within the past 24 hours have been reviewed.    Significant Imaging: I have reviewed all pertinent imaging results/findings within the past 24 hours.        Assessment/Plan:      * Eczematous dermatitis  Dermatology previously saw the patient and recommended triamcinolone ointment and cream. Patient states that the rash is worsening despite this intervention. She was also previously treated with antibiotics for presumed cellulitis. She has no current signs of infection. She is taking HCTZ and phenytoin, both which can be associated with rash. She does not have mucous membrane involvement. Doubt SJS/TEN/DRESS. No liver/kidney dysfunction.   - continue triamcinolone  - pending transfer to [facility] for Dermatology consult. Called on 5/25 in AM- they are on diversion. Will call again this afternoon.     Derm following, appreciate recs:    Patient with reported 1 month history of worsening pruritic rash with associated edema of the extremities, not improved after antibiotic course from last hospital admission. Per chart review, has been seen in ED multiple times over  past 2-3 years for similar presentation of rash. Has not been applying any topical steroids. No mucous membrane involvement. No new medications or supplements. Afebrile, labs wnl. DDx includes atopic dermatitis vs drug eruption vs allergic contact dermatitis vs CTCL vs malignancy vs psoriasis vs other  - Start triamcinolone 0.1% ointment BID to affected areas on trunk, arms, and legs. Do not use on face, armpits, or groin. Please supply patient with 454g jar at bedside  - Continue regular emollient use at least twice daily with vaseline or Eucerin  - Start hydroxyzine 25 mg TID prn for itching  - Punch biopsy of the right upper arm performed today, as below  - Gelfoam used, no suture removal needed  - Will follow up results  - If no improvement with topical steroids over next few days, consider starting po prednisone  - Patient still needs outpatient follow up at [facility], please notify dermatology prior to discharge to help set up an appt    Hypokalemia  Replace and monitor  Resolved.       Thrombocytopenia  Noted on admit, now resolved.      Macrocytosis  Noted      Class 1 obesity due to excess calories with serious comorbidity and body mass index (BMI) of 30.0 to 30.9 in adult  Body mass index is 32.6 kg/m².  Weight loss as outpatient        Gastroesophageal reflux disease without esophagitis  Chronic. Stable. Currently asymptomatic. Home medications include PPI/Antacids as needed.  Plan:  -Continue H2 blocker      Anxiety and depression  Continue home [facility]  Spiritual care has visited the patient         Erythroderma        Bipolar 1 disorder  Chronic. Stable. Not in acute exacerbation and currently denies endorsing any suicidal/homicidal ideations.   - continue home seroquel      HTN (hypertension)  Continue HCTZ, amlodipine       Seizure  Chronic. Stable on home medications.  -continue dilantin   -seizure precautions         VTE Risk Mitigation (From admission, onward)         Ordered     IP VTE HIGH RISK  PATIENT  Once         05/24/22 0213     Place sequential compression device  Until discontinued         05/24/22 0213                Discharge Planning   CHRISTOPHER: 5/28/2022     Code Status: Full Code   Is the patient medically ready for discharge?:     Reason for patient still in hospital (select all that apply): Treatment  Discharge Plan A: Home with family                  Daisy Diaz MD  Department of Hospital Medicine   [facility] - Intensive Care ([place])

## 2022-05-29 LAB
ALBUMIN SERPL BCP-MCNC: 4.1 G/DL (ref 3.5–5.2)
ALP SERPL-CCNC: 61 U/L (ref 55–135)
ALT SERPL W/O P-5'-P-CCNC: 23 U/L (ref 10–44)
ANION GAP SERPL CALC-SCNC: 10 MMOL/L (ref 8–16)
AST SERPL-CCNC: 30 U/L (ref 10–40)
BASOPHILS # BLD AUTO: 0.04 K/UL (ref 0–0.2)
BASOPHILS NFR BLD: 0.6 % (ref 0–1.9)
BILIRUB SERPL-MCNC: 0.3 MG/DL (ref 0.1–1)
BUN SERPL-MCNC: 10 MG/DL (ref 6–20)
CALCIUM SERPL-MCNC: 9.8 MG/DL (ref 8.7–10.5)
CHLORIDE SERPL-SCNC: 101 MMOL/L (ref 95–110)
CO2 SERPL-SCNC: 24 MMOL/L (ref 23–29)
CREAT SERPL-MCNC: 1.1 MG/DL (ref 0.5–1.4)
DIFFERENTIAL METHOD: ABNORMAL
EOSINOPHIL # BLD AUTO: 0.2 K/UL (ref 0–0.5)
EOSINOPHIL NFR BLD: 3.5 % (ref 0–8)
ERYTHROCYTE [DISTWIDTH] IN BLOOD BY AUTOMATED COUNT: 11.9 % (ref 11.5–14.5)
EST. GFR  (AFRICAN AMERICAN): >60 ML/MIN/1.73 M^2
EST. GFR  (NON AFRICAN AMERICAN): >60 ML/MIN/1.73 M^2
GLUCOSE SERPL-MCNC: 90 MG/DL (ref 70–110)
HCT VFR BLD AUTO: 46.2 % (ref 37–48.5)
HGB BLD-MCNC: 14.7 G/DL (ref 12–16)
IMM GRANULOCYTES # BLD AUTO: 0.02 K/UL (ref 0–0.04)
IMM GRANULOCYTES NFR BLD AUTO: 0.3 % (ref 0–0.5)
LYMPHOCYTES # BLD AUTO: 2.2 K/UL (ref 1–4.8)
LYMPHOCYTES NFR BLD: 33.8 % (ref 18–48)
MCH RBC QN AUTO: 31.9 PG (ref 27–31)
MCHC RBC AUTO-ENTMCNC: 31.8 G/DL (ref 32–36)
MCV RBC AUTO: 100 FL (ref 82–98)
MONOCYTES # BLD AUTO: 0.7 K/UL (ref 0.3–1)
MONOCYTES NFR BLD: 11.1 % (ref 4–15)
NEUTROPHILS # BLD AUTO: 3.3 K/UL (ref 1.8–7.7)
NEUTROPHILS NFR BLD: 50.7 % (ref 38–73)
NRBC BLD-RTO: 0 /100 WBC
PLATELET # BLD AUTO: 339 K/UL (ref 150–450)
PMV BLD AUTO: 10 FL (ref 9.2–12.9)
POTASSIUM SERPL-SCNC: 4.1 MMOL/L (ref 3.5–5.1)
PROT SERPL-MCNC: 8 G/DL (ref 6–8.4)
RBC # BLD AUTO: 4.61 M/UL (ref 4–5.4)
SODIUM SERPL-SCNC: 135 MMOL/L (ref 136–145)
WBC # BLD AUTO: 6.59 K/UL (ref 3.9–12.7)

## 2022-05-29 PROCEDURE — 25000003 PHARM REV CODE 250: Performed by: HOSPITALIST

## 2022-05-29 PROCEDURE — 36415 COLL VENOUS BLD VENIPUNCTURE: CPT | Performed by: STUDENT IN AN ORGANIZED HEALTH CARE EDUCATION/TRAINING PROGRAM

## 2022-05-29 PROCEDURE — 99233 SBSQ HOSP IP/OBS HIGH 50: CPT | Mod: ,,, | Performed by: STUDENT IN AN ORGANIZED HEALTH CARE EDUCATION/TRAINING PROGRAM

## 2022-05-29 PROCEDURE — 12000002 HC ACUTE/MED SURGE SEMI-PRIVATE ROOM

## 2022-05-29 PROCEDURE — 25000003 PHARM REV CODE 250: Performed by: STUDENT IN AN ORGANIZED HEALTH CARE EDUCATION/TRAINING PROGRAM

## 2022-05-29 PROCEDURE — 99233 PR SUBSEQUENT HOSPITAL CARE,LEVL III: ICD-10-PCS | Mod: ,,, | Performed by: STUDENT IN AN ORGANIZED HEALTH CARE EDUCATION/TRAINING PROGRAM

## 2022-05-29 PROCEDURE — 85025 COMPLETE CBC W/AUTO DIFF WBC: CPT | Performed by: STUDENT IN AN ORGANIZED HEALTH CARE EDUCATION/TRAINING PROGRAM

## 2022-05-29 PROCEDURE — 80053 COMPREHEN METABOLIC PANEL: CPT | Performed by: STUDENT IN AN ORGANIZED HEALTH CARE EDUCATION/TRAINING PROGRAM

## 2022-05-29 PROCEDURE — 94761 N-INVAS EAR/PLS OXIMETRY MLT: CPT

## 2022-05-29 PROCEDURE — 25000003 PHARM REV CODE 250: Performed by: DERMATOLOGY

## 2022-05-29 RX ORDER — POLYETHYLENE GLYCOL 3350 17 G/17G
17 POWDER, FOR SOLUTION ORAL DAILY
Status: DISCONTINUED | OUTPATIENT
Start: 2022-05-29 | End: 2022-06-01 | Stop reason: HOSPADM

## 2022-05-29 RX ORDER — MUPIROCIN 20 MG/G
OINTMENT TOPICAL 2 TIMES DAILY
Status: DISCONTINUED | OUTPATIENT
Start: 2022-05-29 | End: 2022-06-01 | Stop reason: HOSPADM

## 2022-05-29 RX ADMIN — PHENYTOIN SODIUM 100 MG: 100 CAPSULE ORAL at 11:05

## 2022-05-29 RX ADMIN — HYDROCODONE BITARTRATE AND ACETAMINOPHEN 1 TABLET: 5; 325 TABLET ORAL at 09:05

## 2022-05-29 RX ADMIN — MUPIROCIN: 20 OINTMENT TOPICAL at 09:05

## 2022-05-29 RX ADMIN — FAMOTIDINE 20 MG: 20 TABLET ORAL at 11:05

## 2022-05-29 RX ADMIN — CETIRIZINE HYDROCHLORIDE 10 MG: 10 TABLET, FILM COATED ORAL at 11:05

## 2022-05-29 RX ADMIN — MUPIROCIN: 20 OINTMENT TOPICAL at 11:05

## 2022-05-29 RX ADMIN — QUETIAPINE FUMARATE 400 MG: 200 TABLET ORAL at 09:05

## 2022-05-29 RX ADMIN — TRIAMCINOLONE ACETONIDE: 1 OINTMENT TOPICAL at 09:05

## 2022-05-29 RX ADMIN — PHENYTOIN SODIUM 100 MG: 100 CAPSULE ORAL at 04:05

## 2022-05-29 RX ADMIN — PHENYTOIN SODIUM 100 MG: 100 CAPSULE ORAL at 09:05

## 2022-05-29 RX ADMIN — SENNOSIDES AND DOCUSATE SODIUM 1 TABLET: 50; 8.6 TABLET ORAL at 09:05

## 2022-05-29 RX ADMIN — HYDROCHLOROTHIAZIDE 25 MG: 12.5 TABLET ORAL at 11:05

## 2022-05-29 RX ADMIN — HYDROCODONE BITARTRATE AND ACETAMINOPHEN 1 TABLET: 5; 325 TABLET ORAL at 01:05

## 2022-05-29 RX ADMIN — AMLODIPINE BESYLATE 10 MG: 10 TABLET ORAL at 11:05

## 2022-05-29 RX ADMIN — TRIAMCINOLONE ACETONIDE: 1 OINTMENT TOPICAL at 11:05

## 2022-05-29 RX ADMIN — ESCITALOPRAM OXALATE 20 MG: 10 TABLET ORAL at 06:05

## 2022-05-29 RX ADMIN — HYDROCODONE BITARTRATE AND ACETAMINOPHEN 1 TABLET: 5; 325 TABLET ORAL at 12:05

## 2022-05-29 RX ADMIN — FAMOTIDINE 20 MG: 20 TABLET ORAL at 09:05

## 2022-05-29 RX ADMIN — POLYETHYLENE GLYCOL 3350 17 G: 17 POWDER, FOR SOLUTION ORAL at 09:05

## 2022-05-29 RX ADMIN — SENNOSIDES AND DOCUSATE SODIUM 1 TABLET: 50; 8.6 TABLET ORAL at 11:05

## 2022-05-29 NOTE — PLAN OF CARE
Pt AAO X 4; able to express needs. Ultrasound last night to rule out DVT. Applied Triamcinolone Cream to entire body.  Independent to bathroom.   Safety maintained.  Bed in low position,  call  light in reach.

## 2022-05-30 ENCOUNTER — TELEPHONE (OUTPATIENT)
Dept: HEPATOLOGY | Facility: CLINIC | Age: 42
End: 2022-05-30
Payer: MEDICAID

## 2022-05-30 PROBLEM — B18.2 CHRONIC HEPATITIS C WITHOUT HEPATIC COMA: Status: ACTIVE | Noted: 2022-05-30

## 2022-05-30 PROBLEM — F43.9 STRESS: Status: ACTIVE | Noted: 2022-05-30

## 2022-05-30 PROCEDURE — 99233 SBSQ HOSP IP/OBS HIGH 50: CPT | Mod: ,,, | Performed by: HOSPITALIST

## 2022-05-30 PROCEDURE — 94761 N-INVAS EAR/PLS OXIMETRY MLT: CPT

## 2022-05-30 PROCEDURE — 25000003 PHARM REV CODE 250: Performed by: HOSPITALIST

## 2022-05-30 PROCEDURE — 99233 PR SUBSEQUENT HOSPITAL CARE,LEVL III: ICD-10-PCS | Mod: ,,, | Performed by: HOSPITALIST

## 2022-05-30 PROCEDURE — 25000003 PHARM REV CODE 250: Performed by: DERMATOLOGY

## 2022-05-30 PROCEDURE — 12000002 HC ACUTE/MED SURGE SEMI-PRIVATE ROOM

## 2022-05-30 PROCEDURE — 25000003 PHARM REV CODE 250: Performed by: STUDENT IN AN ORGANIZED HEALTH CARE EDUCATION/TRAINING PROGRAM

## 2022-05-30 RX ADMIN — FAMOTIDINE 20 MG: 20 TABLET ORAL at 09:05

## 2022-05-30 RX ADMIN — ESCITALOPRAM OXALATE 20 MG: 10 TABLET ORAL at 08:05

## 2022-05-30 RX ADMIN — PHENYTOIN SODIUM 100 MG: 100 CAPSULE ORAL at 08:05

## 2022-05-30 RX ADMIN — SENNOSIDES AND DOCUSATE SODIUM 1 TABLET: 50; 8.6 TABLET ORAL at 08:05

## 2022-05-30 RX ADMIN — ALPRAZOLAM 1 MG: 0.5 TABLET ORAL at 09:05

## 2022-05-30 RX ADMIN — HYDROCODONE BITARTRATE AND ACETAMINOPHEN 1 TABLET: 5; 325 TABLET ORAL at 02:05

## 2022-05-30 RX ADMIN — HYDROCODONE BITARTRATE AND ACETAMINOPHEN 1 TABLET: 5; 325 TABLET ORAL at 08:05

## 2022-05-30 RX ADMIN — CETIRIZINE HYDROCHLORIDE 10 MG: 10 TABLET, FILM COATED ORAL at 08:05

## 2022-05-30 RX ADMIN — POLYETHYLENE GLYCOL 3350 17 G: 17 POWDER, FOR SOLUTION ORAL at 08:05

## 2022-05-30 RX ADMIN — PHENYTOIN SODIUM 100 MG: 100 CAPSULE ORAL at 09:05

## 2022-05-30 RX ADMIN — AMLODIPINE BESYLATE 10 MG: 10 TABLET ORAL at 08:05

## 2022-05-30 RX ADMIN — TRIAMCINOLONE ACETONIDE: 1 OINTMENT TOPICAL at 09:05

## 2022-05-30 RX ADMIN — HYDROCHLOROTHIAZIDE 25 MG: 12.5 TABLET ORAL at 08:05

## 2022-05-30 RX ADMIN — QUETIAPINE FUMARATE 400 MG: 200 TABLET ORAL at 09:05

## 2022-05-30 RX ADMIN — PHENYTOIN SODIUM 100 MG: 100 CAPSULE ORAL at 02:05

## 2022-05-30 RX ADMIN — HYDROXYZINE PAMOATE 25 MG: 25 CAPSULE ORAL at 10:05

## 2022-05-30 RX ADMIN — MUPIROCIN: 20 OINTMENT TOPICAL at 09:05

## 2022-05-30 RX ADMIN — HYDROCODONE BITARTRATE AND ACETAMINOPHEN 1 TABLET: 5; 325 TABLET ORAL at 09:05

## 2022-05-30 RX ADMIN — TRIAMCINOLONE ACETONIDE: 1 OINTMENT TOPICAL at 08:05

## 2022-05-30 RX ADMIN — FAMOTIDINE 20 MG: 20 TABLET ORAL at 08:05

## 2022-05-30 RX ADMIN — MUPIROCIN: 20 OINTMENT TOPICAL at 08:05

## 2022-05-30 NOTE — PLAN OF CARE
Problem: Adult Inpatient Plan of Care  Goal: Plan of Care Review  Outcome: Ongoing, Progressing     Problem: Adult Inpatient Plan of Care  Goal: Readiness for Transition of Care  Outcome: Ongoing, Progressing     Problem: Impaired Wound Healing  Goal: Optimal Wound Healing  Outcome: Ongoing, Progressing     Problem: Skin Injury Risk Increased  Goal: Skin Health and Integrity  Outcome: Ongoing, Progressing    Pt AOx4. No acute events noted during shift. Vitals remained stable. Had c/o pain and discomfort, PRN meds given per MD orders. Q1-2hr safety checks completed. Bed remained low, locked, with all personal items in reach.

## 2022-05-30 NOTE — TELEPHONE ENCOUNTER
Patient currently hospitalized.  Avani Mendez PA-C ordered that patient be scheduled for hepatology consult visit.  Patient hep c quant positive.  Appointment with PA Scheuermann scheduled 6/27/22; appt reminder notice mailed.

## 2022-05-30 NOTE — PLAN OF CARE
Problem: Adult Inpatient Plan of Care  Goal: Plan of Care Review  Outcome: Ongoing, Progressing  Flowsheets (Taken 5/30/2022 1785)  Plan of Care Reviewed With: patient     Problem: Adult Inpatient Plan of Care  Goal: Absence of Hospital-Acquired Illness or Injury  Outcome: Ongoing, Progressing     Problem: Skin Injury Risk Increased  Goal: Skin Health and Integrity  Outcome: Ongoing, Progressing   Pt is AAOx4,VS WNL on room air. Pt turns and repositions independently No new skin breakdown noted,ointment applied. PRM meds given per MAR.  X-ray of left tibia done. Pt pain and safety monitored q 1-2 hrs this shift . Bed locked and in lowest position. Rails elevated x 3. Brakes on. Call light and personal belongings in reach. Will continue monitor. Pt's family at bedside.

## 2022-05-31 LAB
CK SERPL-CCNC: 47 U/L (ref 20–180)
CRP SERPL-MCNC: 0.5 MG/L (ref 0–8.2)
ERYTHROCYTE [SEDIMENTATION RATE] IN BLOOD BY WESTERGREN METHOD: 39 MM/HR (ref 0–36)

## 2022-05-31 PROCEDURE — 82550 ASSAY OF CK (CPK): CPT | Performed by: HOSPITALIST

## 2022-05-31 PROCEDURE — 94761 N-INVAS EAR/PLS OXIMETRY MLT: CPT

## 2022-05-31 PROCEDURE — 36415 COLL VENOUS BLD VENIPUNCTURE: CPT | Performed by: HOSPITALIST

## 2022-05-31 PROCEDURE — 63600175 PHARM REV CODE 636 W HCPCS: Performed by: INTERNAL MEDICINE

## 2022-05-31 PROCEDURE — 12000002 HC ACUTE/MED SURGE SEMI-PRIVATE ROOM

## 2022-05-31 PROCEDURE — 85652 RBC SED RATE AUTOMATED: CPT | Performed by: HOSPITALIST

## 2022-05-31 PROCEDURE — 99232 PR SUBSEQUENT HOSPITAL CARE,LEVL II: ICD-10-PCS | Mod: ,,, | Performed by: INTERNAL MEDICINE

## 2022-05-31 PROCEDURE — 99232 SBSQ HOSP IP/OBS MODERATE 35: CPT | Mod: ,,, | Performed by: INTERNAL MEDICINE

## 2022-05-31 PROCEDURE — A9585 GADOBUTROL INJECTION: HCPCS | Performed by: INTERNAL MEDICINE

## 2022-05-31 PROCEDURE — 86140 C-REACTIVE PROTEIN: CPT | Performed by: HOSPITALIST

## 2022-05-31 PROCEDURE — 25000003 PHARM REV CODE 250: Performed by: STUDENT IN AN ORGANIZED HEALTH CARE EDUCATION/TRAINING PROGRAM

## 2022-05-31 PROCEDURE — 25000003 PHARM REV CODE 250: Performed by: HOSPITALIST

## 2022-05-31 PROCEDURE — 25500020 PHARM REV CODE 255: Performed by: INTERNAL MEDICINE

## 2022-05-31 RX ORDER — PREDNISONE 20 MG/1
20 TABLET ORAL DAILY
Status: DISCONTINUED | OUTPATIENT
Start: 2022-06-10 | End: 2022-06-01 | Stop reason: HOSPADM

## 2022-05-31 RX ORDER — PREDNISONE 20 MG/1
40 TABLET ORAL DAILY
Status: DISCONTINUED | OUTPATIENT
Start: 2022-06-05 | End: 2022-06-01 | Stop reason: HOSPADM

## 2022-05-31 RX ORDER — PREDNISONE 20 MG/1
60 TABLET ORAL DAILY
Status: DISCONTINUED | OUTPATIENT
Start: 2022-05-31 | End: 2022-06-01 | Stop reason: HOSPADM

## 2022-05-31 RX ORDER — GADOBUTROL 604.72 MG/ML
10 INJECTION INTRAVENOUS
Status: COMPLETED | OUTPATIENT
Start: 2022-05-31 | End: 2022-05-31

## 2022-05-31 RX ADMIN — MUPIROCIN: 20 OINTMENT TOPICAL at 09:05

## 2022-05-31 RX ADMIN — FAMOTIDINE 20 MG: 20 TABLET ORAL at 08:05

## 2022-05-31 RX ADMIN — CETIRIZINE HYDROCHLORIDE 10 MG: 10 TABLET, FILM COATED ORAL at 09:05

## 2022-05-31 RX ADMIN — PHENYTOIN SODIUM 100 MG: 100 CAPSULE ORAL at 09:05

## 2022-05-31 RX ADMIN — SENNOSIDES AND DOCUSATE SODIUM 1 TABLET: 50; 8.6 TABLET ORAL at 08:05

## 2022-05-31 RX ADMIN — HYDROCODONE BITARTRATE AND ACETAMINOPHEN 1 TABLET: 5; 325 TABLET ORAL at 09:05

## 2022-05-31 RX ADMIN — FAMOTIDINE 20 MG: 20 TABLET ORAL at 09:05

## 2022-05-31 RX ADMIN — HYDROCHLOROTHIAZIDE 25 MG: 12.5 TABLET ORAL at 09:05

## 2022-05-31 RX ADMIN — GADOBUTROL 10 ML: 604.72 INJECTION INTRAVENOUS at 05:05

## 2022-05-31 RX ADMIN — HYDROXYZINE PAMOATE 25 MG: 25 CAPSULE ORAL at 09:05

## 2022-05-31 RX ADMIN — POLYETHYLENE GLYCOL 3350 17 G: 17 POWDER, FOR SOLUTION ORAL at 09:05

## 2022-05-31 RX ADMIN — PHENYTOIN SODIUM 100 MG: 100 CAPSULE ORAL at 03:05

## 2022-05-31 RX ADMIN — TRIAMCINOLONE ACETONIDE: 1 OINTMENT TOPICAL at 09:05

## 2022-05-31 RX ADMIN — ESCITALOPRAM OXALATE 20 MG: 10 TABLET ORAL at 06:05

## 2022-05-31 RX ADMIN — PHENYTOIN SODIUM 100 MG: 100 CAPSULE ORAL at 08:05

## 2022-05-31 RX ADMIN — SENNOSIDES AND DOCUSATE SODIUM 1 TABLET: 50; 8.6 TABLET ORAL at 09:05

## 2022-05-31 RX ADMIN — QUETIAPINE FUMARATE 400 MG: 200 TABLET ORAL at 08:05

## 2022-05-31 RX ADMIN — PREDNISONE 60 MG: 20 TABLET ORAL at 01:05

## 2022-05-31 RX ADMIN — HYDROCODONE BITARTRATE AND ACETAMINOPHEN 1 TABLET: 5; 325 TABLET ORAL at 03:05

## 2022-05-31 RX ADMIN — AMLODIPINE BESYLATE 10 MG: 10 TABLET ORAL at 09:05

## 2022-05-31 NOTE — ASSESSMENT & PLAN NOTE
- resulted during this admit; hepatology calling her for clinic appt  - d/w Derm if hepatitis C Dx changes workup (eg cryoglobulinemia, etc)
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
## 2022-05-31 NOTE — ASSESSMENT & PLAN NOTE
Dermatology previously saw the patient and recommended triamcinolone ointment and cream. Patient states that the rash is worsening despite this intervention. She was also previously treated with antibiotics for presumed cellulitis. She has no current signs of infection. She is taking HCTZ and phenytoin, both which can be associated with rash. She does not have mucous membrane involvement. Doubt SJS/TEN/DRESS. No liver/kidney dysfunction.     Derm consulted, appreciate recs:    Patient with reported 1 month history of worsening pruritic rash with associated edema of the extremities, not improved after antibiotic course from last hospital admission. Per chart review, has been seen in ED multiple times over past 2-3 years for similar presentation of rash. Has not been applying any topical steroids. No mucous membrane involvement. No new medications or supplements. Afebrile, labs wnl. DDx includes atopic dermatitis vs drug eruption vs allergic contact dermatitis vs CTCL vs malignancy vs psoriasis vs other  - Start triamcinolone 0.1% ointment BID to affected areas on trunk, arms, and legs. Do not use on face, armpits, or groin. Please supply patient with 454g jar at bedside  - Continue regular emollient use at least twice daily with vaseline or Eucerin  - Start hydroxyzine 25 mg TID prn for itching  - Punch biopsy of the right upper arm on 5/30. Gelfoam used, no suture removal needed  - f/u path of Bx  - If no improvement with topical steroids over next few days, consider starting po prednisone  - Patient still needs outpatient follow up at [facility], please notify dermatology prior to discharge to help set up an appt    - For new LLE pain (see interval Hx), described as bony pain, checked XR tib/fib, no Fx and wnl. LE Doppler neg for DVT. Had prior CT leg (tib/fib) bilateral with contrast on 5/7 with extensive soft tissue swelling of BLE associated with superficial fascial fluid; no abscess, no definite myonecrosis of OM   -  check MRI LLE to eval fasciitis/OM/other   - check CPK to eval rhabdo   - check CRP (ESR checked and wnl)

## 2022-05-31 NOTE — SUBJECTIVE & OBJECTIVE
"Interval History: itching is improved on topical Tx, however her LLE pain is worse today with a new problem of severe pain when leg is straight, "stinging" pain, so she is holding her leg bent at the knee.  Denies pain in L knee joint, and no pain with ROM of knee.      Review of Systems   Constitutional:  Negative for chills and fever.   Respiratory:  Negative for cough and shortness of breath.    Cardiovascular:  Negative for chest pain, palpitations and leg swelling.   Gastrointestinal:  Negative for abdominal pain, diarrhea and nausea.   Musculoskeletal:  Positive for arthralgias. Negative for joint swelling and myalgias.   Skin:  Positive for rash.   Neurological:  Negative for weakness and headaches.   Psychiatric/Behavioral:  Negative for agitation and confusion.    Objective:     Vital Signs (Most Recent):  Temp: 97.9 °F (36.6 °C) (05/30/22 1553)  Pulse: 81 (05/30/22 1553)  Resp: 18 (05/30/22 1553)  BP: (!) 143/89 (05/30/22 1553)  SpO2: 96 % (05/30/22 1553)   Vital Signs (24h Range):  Temp:  [97.6 °F (36.4 °C)-98.7 °F (37.1 °C)] 97.9 °F (36.6 °C)  Pulse:  [71-85] 81  Resp:  [16-19] 18  SpO2:  [93 %-99 %] 96 %  BP: (105-148)/(63-91) 143/89     Weight: 89.2 kg (196 lb 10.4 oz)  Body mass index is 31.74 kg/m².    Intake/Output Summary (Last 24 hours) at 5/30/2022 1920  Last data filed at 5/30/2022 1300  Gross per 24 hour   Intake 720 ml   Output 450 ml   Net 270 ml      Physical Exam  Constitutional:       General: She is not in acute distress.     Appearance: She is not toxic-appearing.   HENT:      Mouth/Throat:      Mouth: Mucous membranes are dry.   Cardiovascular:      Rate and Rhythm: Normal rate and regular rhythm.      Heart sounds: No murmur heard.  Pulmonary:      Effort: No respiratory distress.      Breath sounds: No wheezing or rales.   Abdominal:      General: Abdomen is flat. There is no distension.      Palpations: Abdomen is soft. There is no mass.   Musculoskeletal:         General: No " swelling, tenderness or deformity.      Right lower leg: No edema.      Left lower leg: No edema.   Skin:     General: Skin is warm and dry.      Findings: Rash present.   Neurological:      General: No focal deficit present.      Mental Status: She is oriented to person, place, and time.   Psychiatric:         Mood and Affect: Mood normal.         Behavior: Behavior normal.       Significant Labs: All pertinent labs within the past 24 hours have been reviewed.    Significant Imaging: I have reviewed all pertinent imaging results/findings within the past 24 hours.

## 2022-05-31 NOTE — ASSESSMENT & PLAN NOTE
Dermatology previously saw the patient and recommended triamcinolone ointment and cream. Patient states that the rash is worsening despite this intervention. She was also previously treated with antibiotics for presumed cellulitis. She has no current signs of infection. She is taking HCTZ and phenytoin, both which can be associated with rash. She does not have mucous membrane involvement. Doubt SJS/TEN/DRESS. No liver/kidney dysfunction.     Derm consulted, appreciate recs:    Patient with reported 1 month history of worsening pruritic rash with associated edema of the extremities, not improved after antibiotic course from last hospital admission. Per chart review, has been seen in ED multiple times over past 2-3 years for similar presentation of rash. Has not been applying any topical steroids. No mucous membrane involvement. No new medications or supplements. Afebrile, labs wnl. DDx includes atopic dermatitis vs drug eruption vs allergic contact dermatitis vs CTCL vs malignancy vs psoriasis vs other  - Start triamcinolone 0.1% ointment BID to affected areas on trunk, arms, and legs. Do not use on face, armpits, or groin. Please supply patient with 454g jar at bedside  - Continue regular emollient use at least twice daily with vaseline or Eucerin  - Start hydroxyzine 25 mg TID prn for itching  - Punch biopsy of the right upper arm on 5/30. Gelfoam used, no suture removal needed  - f/u path of Bx  - If no improvement with topical steroids over next few days, consider starting po prednisone  - Patient still needs outpatient follow up at [facility], please notify dermatology prior to discharge to help set up an appt  - For new LLE pain (see interval Hx), described as bony pain, checked XR tib/fib, no Fx and wnl. LE Doppler neg for DVT. Had prior CT leg (tib/fib) bilateral with contrast on 5/7 with extensive soft tissue swelling of BLE associated with superficial fascial fluid; no abscess, no definite myonecrosis of OM   -  check MRI LLE to eval fasciitis/OM/other - pending   - check CPK to eval rhabdo - neg   - check CRP (ESR checked and wnl) - wnl

## 2022-05-31 NOTE — SUBJECTIVE & OBJECTIVE
Interval History: Itching and rash is improved on topical Tx, still quite visible. As per RN rash is much better. As per patient, rash is not improving. Rash appears to be stable as per pictures. Still continues to have LLE pain awaiting MRI which was ordered yesterday. Otherwise stable. Plan to curbside derm today for possible need for po steroids.     Review of Systems   Constitutional:  Negative for chills and fever.   Respiratory:  Negative for cough and shortness of breath.    Cardiovascular:  Negative for chest pain, palpitations and leg swelling.   Gastrointestinal:  Negative for abdominal pain, diarrhea and nausea.   Musculoskeletal:  Positive for arthralgias. Negative for joint swelling and myalgias.   Skin:  Positive for rash.   Neurological:  Negative for weakness and headaches.   Psychiatric/Behavioral:  Negative for agitation and confusion.      Objective:     Vital Signs (Most Recent):  Temp: 97.8 °F (36.6 °C) (05/31/22 1100)  Pulse: 85 (05/31/22 1100)  Resp: 17 (05/31/22 1100)  BP: 125/85 (05/31/22 1100)  SpO2: 95 % (05/31/22 1100)   Vital Signs (24h Range):  Temp:  [97.8 °F (36.6 °C)-98.2 °F (36.8 °C)] 97.8 °F (36.6 °C)  Pulse:  [81-89] 85  Resp:  [16-18] 17  SpO2:  [92 %-99 %] 95 %  BP: (109-143)/(57-89) 125/85     Weight: 89.2 kg (196 lb 10.4 oz)  Body mass index is 31.74 kg/m².    Intake/Output Summary (Last 24 hours) at 5/31/2022 1216  Last data filed at 5/31/2022 0000  Gross per 24 hour   Intake 320 ml   Output --   Net 320 ml        Physical Exam  Constitutional:       General: She is not in acute distress.     Appearance: She is not toxic-appearing.   HENT:      Mouth/Throat:      Mouth: Mucous membranes are dry.   Cardiovascular:      Rate and Rhythm: Normal rate and regular rhythm.      Heart sounds: No murmur heard.  Pulmonary:      Effort: No respiratory distress.      Breath sounds: No wheezing or rales.   Abdominal:      General: Abdomen is flat. There is no distension.      Palpations:  Abdomen is soft. There is no mass.   Musculoskeletal:         General: No swelling, tenderness or deformity.      Right lower leg: No edema.      Left lower leg: No edema.   Skin:     General: Skin is warm and dry.      Findings: Rash present.   Neurological:      General: No focal deficit present.      Mental Status: She is oriented to person, place, and time.   Psychiatric:         Mood and Affect: Mood normal.         Behavior: Behavior normal.       Significant Labs: All pertinent labs within the past 24 hours have been reviewed.    Significant Imaging: I have reviewed all pertinent imaging results/findings within the past 24 hours.
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
## 2022-06-01 VITALS
BODY MASS INDEX: 31.6 KG/M2 | RESPIRATION RATE: 18 BRPM | WEIGHT: 196.63 LBS | SYSTOLIC BLOOD PRESSURE: 123 MMHG | HEIGHT: 66 IN | DIASTOLIC BLOOD PRESSURE: 84 MMHG | TEMPERATURE: 99 F | OXYGEN SATURATION: 99 % | HEART RATE: 82 BPM

## 2022-06-01 PROCEDURE — 25000003 PHARM REV CODE 250: Performed by: HOSPITALIST

## 2022-06-01 PROCEDURE — 99239 HOSP IP/OBS DSCHRG MGMT >30: CPT | Mod: ,,, | Performed by: STUDENT IN AN ORGANIZED HEALTH CARE EDUCATION/TRAINING PROGRAM

## 2022-06-01 PROCEDURE — 99239 PR HOSPITAL DISCHARGE DAY,>30 MIN: ICD-10-PCS | Mod: ,,, | Performed by: STUDENT IN AN ORGANIZED HEALTH CARE EDUCATION/TRAINING PROGRAM

## 2022-06-01 PROCEDURE — 25000003 PHARM REV CODE 250: Performed by: STUDENT IN AN ORGANIZED HEALTH CARE EDUCATION/TRAINING PROGRAM

## 2022-06-01 PROCEDURE — 63600175 PHARM REV CODE 636 W HCPCS: Performed by: INTERNAL MEDICINE

## 2022-06-01 RX ORDER — TRIAMCINOLONE ACETONIDE 1 MG/G
OINTMENT TOPICAL 2 TIMES DAILY
Qty: 80 G | Refills: 1 | OUTPATIENT
Start: 2022-06-01 | End: 2022-06-22

## 2022-06-01 RX ORDER — PREDNISONE 20 MG/1
TABLET ORAL
Qty: 27 TABLET | Refills: 0 | Status: SHIPPED | OUTPATIENT
Start: 2022-06-02 | End: 2022-06-20

## 2022-06-01 RX ORDER — GABAPENTIN 100 MG/1
100 CAPSULE ORAL ONCE
Status: COMPLETED | OUTPATIENT
Start: 2022-06-01 | End: 2022-06-01

## 2022-06-01 RX ORDER — HYDROXYZINE PAMOATE 25 MG/1
25 CAPSULE ORAL EVERY 8 HOURS PRN
Qty: 42 CAPSULE | Refills: 0 | Status: SHIPPED | OUTPATIENT
Start: 2022-06-01 | End: 2022-06-15

## 2022-06-01 RX ADMIN — ESCITALOPRAM OXALATE 20 MG: 10 TABLET ORAL at 06:06

## 2022-06-01 RX ADMIN — PHENYTOIN SODIUM 100 MG: 100 CAPSULE ORAL at 09:06

## 2022-06-01 RX ADMIN — PREDNISONE 60 MG: 20 TABLET ORAL at 09:06

## 2022-06-01 RX ADMIN — AMLODIPINE BESYLATE 10 MG: 10 TABLET ORAL at 09:06

## 2022-06-01 RX ADMIN — FAMOTIDINE 20 MG: 20 TABLET ORAL at 09:06

## 2022-06-01 RX ADMIN — HYDROCODONE BITARTRATE AND ACETAMINOPHEN 1 TABLET: 5; 325 TABLET ORAL at 10:06

## 2022-06-01 RX ADMIN — HYDROCHLOROTHIAZIDE 25 MG: 12.5 TABLET ORAL at 09:06

## 2022-06-01 RX ADMIN — POLYETHYLENE GLYCOL 3350 17 G: 17 POWDER, FOR SOLUTION ORAL at 09:06

## 2022-06-01 RX ADMIN — MUPIROCIN: 20 OINTMENT TOPICAL at 09:06

## 2022-06-01 RX ADMIN — TRIAMCINOLONE ACETONIDE: 1 OINTMENT TOPICAL at 09:06

## 2022-06-01 RX ADMIN — SENNOSIDES AND DOCUSATE SODIUM 1 TABLET: 50; 8.6 TABLET ORAL at 09:06

## 2022-06-01 RX ADMIN — GABAPENTIN 100 MG: 100 CAPSULE ORAL at 12:06

## 2022-06-01 RX ADMIN — CETIRIZINE HYDROCHLORIDE 10 MG: 10 TABLET, FILM COATED ORAL at 09:06

## 2022-06-01 NOTE — DISCHARGE SUMMARY
[facility] - Intensive Care ([address] Medicine  Discharge Summary      Patient Name: Nicole Collins  MRN: [id number]  Patient Class: IP- Inpatient  Admission Date: 5/23/2022  Hospital Length of Stay: 8 days  Discharge Date and Time:  06/01/2022 3:39 PM  Attending Physician: Jennifer Perez MD   Discharging Provider: Jennifer Perez MD  Primary Care Provider: Sofi Kennedy MD  [facility] Medicine Team: [facility] HOSP MED B Jennifer Perez MD    HPI:   Nicole Collins is a 41 y.o. female with a PMH  has a past medical history of Asthma, Bipolar 1 disorder, Cocaine abuse, DM mellitus, gestational, ETOH abuse, Hypertension, Methamphetamine abuse, Opiate overdose, Seizures, and Trichomonas infection. who presented to the ED complaining of worsening swelling, erythema, and itching of her lower extremities.  Patient initially presented to the ED earlier this month for similar complaints and was transferred to [facility] at which point she was diagnosed with atopic dermatitis, contact dermatitis, with underlying component of cellulitis.  Patient was evaluated by Dermatology who recommended triamcinolone cream, p.o. antibiotics keflex and doxycycline, steroid cream, p.o. Benadryl, extensive skin care regimen, and outpatient follow-up with Dermatology Clinic at [facility].  Since being discharge back on 05/9/2022, patient reported that her rash on her back, torso, and upper extremities have improved but has had worsening swelling, erythema, and persistent itching throughout her extremities with lower being greater than upper.  Patient reported completion of antibiotics in addition to compliance with skin care regimen set forth by Dermatology but continues to endorse symptoms as noted above. She reports she has not yet followed up with Dermatology outpatient as directed and has had no relief from over the counter medications.  She denied endorse any fever, chills, sweats, nausea, vomiting, chest pain, shortness of  breath, abdominal pain, changes in bowel/bladder habits, or neurological deficits and reported all other review of systems negative except those noted above.  ED staff attempted to have patient transferred to [facility] to be re-evaluated by Dermatology but is currently on diversion is no pets are available.  Patient being admitted to Hospital Medicine for observation and continue medical management while awaiting transfer in the morning to [facility] for high-level dermatological care.    PCP: Sofi Kennedy        * No surgery found *      Hospital Course:   Ms Nicole Collins was admitted with diffuse erythematous pruritic blanching macular rash involving the palms and soles but sparing mucous membranes. She has previously been treated for cellulitis with PO Keflex and Doxy x 10 and has been treated with triamcinolone ointment for suspected atopic dermatitis. Despite this, her rash has worsened, becoming more widespread. Continued triamcinolone ointment upon admission. Transferred to [facility] for Dermatology services. Dermatology seen patient, DDx includes atopic dermatitis vs drug eruption vs allergic contact dermatitis vs CTCL vs malignancy vs psoriasis vs other. Rec start triamcinolone 0.1% ointment BID to affected areas on trunk, arms, and legs. Do not use on face, armpits, or groin. Please supply patient with 454g jar at bedside. Continue regular emollient use at least twice daily with vaseline or Eucerin. Start hydroxyzine 25 mg TID prn for itching. S/p Punch biopsy of the right upper arm performed follow up results. If no improvement with topical steroids over next few days, consider starting po prednisone. Rash appears appears to be improving. However on 5/30 she complained of severe LLE knee pain. MRI of Lt tib fib- no osteo- mild overlying subQ edema. Patient with pain still present- starting inferior to knee and extending downward- concern for nerve compression. Dose of 100mg gabapentin trialed. F/u apt  with derm scheduled- as well as PCP and Hepatitis C clinic for new diagnosis of hep C.    Pt deemed appropriate for discharge. Plan discussed with pt, who was agreeable and amenable; medications were discussed and reviewed, outpatient follow-up scheduled, ER precautions were given, all questions were answered to the pt's satisfaction, and Ms. Collins was subsequently discharged.    Physical Exam  Gen: in NAD, appears stated age  Neuro: AAOx3, motor, sensory, and strength grossly intact BL  HEENT: NTNC, EOMI, PERRL, MMM  CVS: RRR, no m/r/g; S1/S2 auscultated with no S3 or S4; capillary refill < 2 sec  Resp: lungs CTAB, no w/r/r; no belabored breathing or accessory muscle use appreciated   Abd: BS+ in all 4 quadrants; NTND, soft to palpation; no organomegaly appreciated   Extrem: pulses full, equal, and regular over all 4 extremities; no UE or LE edema BL  Skin: scaly rash present on arms, legs, trunk- See pictures below                      Goals of Care Treatment Preferences:  Code Status: Full Code      Consults:   Consults (From admission, onward)        Status Ordering Provider     Inpatient consult to Spiritual Care  Once        Provider:  (Not yet assigned)    Completed HORACIO EPPS     Inpatient consult to Dermatology  Once        Provider:  (Not yet assigned)    Completed GANGA VEGA          Final Active Diagnoses:    Diagnosis Date Noted POA    PRINCIPAL PROBLEM:  Eczematous dermatitis [L30.9] 05/09/2022 Yes    Chronic hepatitis C without hepatic coma [B18.2] 05/30/2022 Yes    Macrocytosis [D75.89] 05/25/2022 Yes    Thrombocytopenia [D69.6] 05/25/2022 Yes    Hypokalemia [E87.6] 05/25/2022 Yes    Erythroderma [L53.9] 05/24/2022 Yes    Anxiety and depression [F41.9, F32.A] 05/24/2022 Yes    Gastroesophageal reflux disease without esophagitis [K21.9] 05/24/2022 Yes    Class 1 obesity due to excess calories with serious comorbidity and body mass index (BMI) of 30.0 to 30.9 in adult [E66.09,  Z68.30] 05/24/2022 Not Applicable    HTN (hypertension) [I10] 05/07/2022 Yes    Bipolar 1 disorder [F31.9] 05/07/2022 Yes    Seizure [R56.9] 10/24/2017 Yes      Problems Resolved During this Admission:       Discharged Condition: good    Disposition: Home or Self Care    Follow Up:   Follow-up Information     Sofi Kennedy MD Follow up.    Specialty: Family Medicine  Why: hospital discharge f/u- f/u of LLE pain  Contact information:  [address] [facility] [address]  [phone]             [facility] HEPATOLOGY Follow up.    Specialty: Hepatology  Why: hepatitis C- no treatment at this time  Contact information:  [address] [facility] [id number] [phone]           [facility] DERMATOLOGY Follow up.    Specialty: Dermatology  Why: f/u for re-evaluation of rash  Contact information:  [address] [facility] [id number] [phone]                     Patient Instructions:      Ambulatory referral/consult to Dermatology   Standing Status: Future   Referral Priority: Routine Referral Type: Consultation   Referral Reason: Specialty Services Required   Requested Specialty: Dermatology   Number of Visits Requested: 1     Diet Adult Regular     No driving until:   Order Comments: NO driving until tomorrow- 06/02     Notify your health care provider if you experience any of the following:  worsening rash     Notify your health care provider if you experience any of the following:  severe uncontrolled pain     Activity as tolerated       Significant Diagnostic Studies: Labs: CMP No results for input(s): NA, K, CL, CO2, GLU, BUN, CREATININE, CALCIUM, PROT, ALBUMIN, BILITOT, ALKPHOS, AST, ALT, ANIONGAP, ESTGFRAFRICA, EGFRNONAA in the last 48 hours., CBC No results for input(s): WBC, HGB, HCT, PLT in the last 48 hours. and All labs within the past 24 hours have been reviewed    MRI Left- Tib-Fib:  FINDINGS:  BONES: Probable enchondroma noted within the medullary cavity of the left  proximal tibia.  No fracture.  No avascular necrosis.  No marrow signal abnormality to suggest an infiltrative process.     MUSCLES: Normal bulk and signal intensity.     TENDONS: No tendinosis or tenosynovitis.     MISCELLANEOUS: There is mild nonspecific subcutaneous edema overlying the anteromedial aspect of the bilateral distal tibial shafts.  Visualized neurovascular structures are normal.     Impression:     1. No MR imaging findings to suggest osteomyelitis.  2. Mild nonspecific subcutaneous edema overlying the anteromedial aspect of the distal tibial shafts.    Medications:  Reconciled Home Medications:      Medication List      START taking these medications    hydrOXYzine pamoate 25 MG Cap  Commonly known as: VISTARIL  Take 1 capsule (25 mg total) by mouth every 8 (eight) hours as needed (1st option).     predniSONE 20 MG tablet  Commonly known as: DELTASONE  Take 3 tablets (60 mg total) by mouth once daily for 3 days, THEN 2 tablets (40 mg total) once daily for 5 days, THEN 1 tablet (20 mg total) once daily for 5 days, THEN 1/2 tablet (10 mg total) once daily for 5 days.  Start taking on: June 2, 2022     triamcinolone acetonide 0.1% 0.1 % ointment  Commonly known as: KENALOG  Apply topically to trunk and extremities twice daily. Do NOT apply to face, armpits, or groin.        CONTINUE taking these medications    acetaminophen 325 MG tablet  Commonly known as: TYLENOL  Take 2 tablets (650 mg total) by mouth every 6 (six) hours as needed for Pain (or fever).     albuterol sulfate 2.5 mg/0.5 mL Nebu  Take 2.5 mg by nebulization every 4 (four) hours as needed (cough and wheezing). Rescue     ALPRAZolam 1 MG tablet  Commonly known as: XANAX  Take 1 mg by mouth.     amLODIPine 10 MG tablet  Commonly known as: NORVASC  Take 1 tablet (10 mg total) by mouth once daily.     cetirizine 10 MG tablet  Commonly known as: ZYRTEC  Take 1 tablet (10 mg total) by mouth once daily.     dextroamphetamine-amphetamine 30 mg  Tab  Take 1 tablet by mouth 2 (two) times daily.     EScitalopram oxalate 20 MG tablet  Commonly known as: LEXAPRO  Take 20 mg by mouth every morning.     famotidine 20 MG tablet  Commonly known as: PEPCID  Take 1 tablet (20 mg total) by mouth 2 (two) times daily.     hydroCHLOROthiazide 25 MG tablet  Commonly known as: HYDRODIURIL  Take 1 tablet (25 mg total) by mouth once daily.     ibuprofen 600 MG tablet  Commonly known as: ADVIL,MOTRIN  Take 1 tablet (600 mg total) by mouth every 6 (six) hours as needed for Pain.     menthol 3.2 mg Lozg  by Mucous Membrane route.     methocarbamoL 500 MG Tab  Commonly known as: ROBAXIN  Take 500 mg by mouth 4 (four) times daily.     naloxone 4 mg/actuation Spry  Commonly known as: NARCAN  4mg by nasal route as needed for opioid overdose; may repeat every 2-3 minutes in alternating nostrils until medical help arrives. Call 911     ondansetron 4 MG Tbdl  Commonly known as: ZOFRAN-ODT  Take 1 tablet (4 mg total) by mouth every 8 (eight) hours as needed.     phenytoin 100 MG ER capsule  Commonly known as: DILANTIN  Take 1 capsule (100 mg total) by mouth 3 (three) times daily.     promethazine 25 MG tablet  Commonly known as: PHENERGAN  Take 1 tablet (25 mg total) by mouth every 6 (six) hours as needed for Nausea.     QUEtiapine 50 MG tablet  Commonly known as: SEROQUEL  Take 400 mg by mouth every evening.        STOP taking these medications    cloNIDine 0.1 MG tablet  Commonly known as: CATAPRES     DIPHENHIST 25 mg capsule  Generic drug: diphenhydrAMINE     losartan-hydrochlorothiazide 100-12.5 mg 100-12.5 mg Tab  Commonly known as: HYZAAR     meloxicam 15 MG tablet  Commonly known as: MOBIC            Indwelling Lines/Drains at time of discharge:   Lines/Drains/Airways     None                 Time spent on the discharge of patient: 35 minutes           Jennifer Perez MD  Department of Hospital Medicine  [facility] Intensive Care ([place]-16)

## 2022-06-02 ENCOUNTER — PATIENT OUTREACH (OUTPATIENT)
Dept: ADMINISTRATIVE | Facility: CLINIC | Age: 42
End: 2022-06-02
Payer: MEDICAID

## 2022-06-02 NOTE — PROGRESS NOTES
C3 nurse spoke with Nicole Collins for a TCC post hospital discharge follow up call. The patient reports does not have a scheduled HOSFU appointment. C3 nurse was unable to schedule HOSFU appointment for [facility] PCP. Patient advised to contact their PCP to schedule a HOSPFU within 7 days.     NP @ home visit scheduled.

## 2022-06-02 NOTE — PATIENT INSTRUCTIONS
[facility] teaching reviewed with Nicole Collins . She verbalized understanding.    Education was provided based on the patient's discharge diagnosis using the attached [facility] patient education as a reference.

## 2022-06-06 ENCOUNTER — OFFICE VISIT (OUTPATIENT)
Dept: HOME HEALTH SERVICES | Facility: CLINIC | Age: 42
End: 2022-06-06
Payer: MEDICAID

## 2022-06-06 DIAGNOSIS — L30.8 OTHER ECZEMA: Primary | ICD-10-CM

## 2022-06-06 PROCEDURE — 99348 PR HOME VISIT,ESTAB PATIENT,LEVEL II: ICD-10-PCS | Mod: S$GLB,,, | Performed by: NURSE PRACTITIONER

## 2022-06-06 PROCEDURE — 1111F PR DISCHARGE MEDS RECONCILED W/ CURRENT OUTPATIENT MED LIST: ICD-10-PCS | Mod: CPTII,S$GLB,, | Performed by: NURSE PRACTITIONER

## 2022-06-06 PROCEDURE — 99348 HOME/RES VST EST LOW MDM 30: CPT | Mod: S$GLB,,, | Performed by: NURSE PRACTITIONER

## 2022-06-06 PROCEDURE — 1111F DSCHRG MED/CURRENT MED MERGE: CPT | Mod: CPTII,S$GLB,, | Performed by: NURSE PRACTITIONER

## 2022-06-08 NOTE — PROGRESS NOTES
DOS: 6/6/22      As scheduled appointments see this patient on Monday June 6, 2022. When I arrived to the house at the [facility] on between 2:00 p.m. and 4:00 p.m., the patient was not at home.  Her family member called her, and she relates to him that she would like to be seen at a later date.  I will reschedule her for another visit. No additional needs at this this time. All of my information was given to the patient and family if any questions or concerns arise.    Rikki Judice, AG-ACNP [facility] [place]

## 2022-06-13 NOTE — PROGRESS NOTES
[]Hide copied text    []Kian for details    [facility] - Intensive Care ([address] Medicine  Progress Note     Patient Name: Nicole Collins  MRN: [id number]  Patient Class: IP- Inpatient     Admission Date: 5/23/2022  Length of Stay: 3 days  Attending Physician: Daisy Diaz MD  Primary Care Provider: Sofi Kennedy MD           Subjective:      Principal Problem:Eczematous dermatitis           HPI:  Nicole Collins is a 41 y.o. female with a PMH  has a past medical history of Asthma, Bipolar 1 disorder, Cocaine abuse, DM mellitus, gestational, ETOH abuse, Hypertension, Methamphetamine abuse, Opiate overdose, Seizures, and Trichomonas infection. who presented to the ED complaining of worsening swelling, erythema, and itching of her lower extremities.  Patient initially presented to the ED earlier this month for similar complaints and was transferred to [facility] at which point she was diagnosed with atopic dermatitis, contact dermatitis, with underlying component of cellulitis.  Patient was evaluated by Dermatology who recommended triamcinolone cream, p.o. antibiotics keflex and doxycycline, steroid cream, p.o. Benadryl, extensive skin care regimen, and outpatient follow-up with Dermatology Clinic at [facility].  Since being discharge back on 05/9/2022, patient reported that her rash on her back, torso, and upper extremities have improved but has had worsening swelling, erythema, and persistent itching throughout her extremities with lower being greater than upper.  Patient reported completion of antibiotics in addition to compliance with skin care regimen set forth by Dermatology but continues to endorse symptoms as noted above. She reports she has not yet followed up with Dermatology outpatient as directed and has had no relief from over the counter medications.  She denied endorse any fever, chills, sweats, nausea, vomiting, chest pain, shortness of breath, abdominal pain, changes in  bowel/bladder habits, or neurological deficits and reported all other review of systems negative except those noted above.  ED staff attempted to have patient transferred to [facility] to be re-evaluated by Dermatology but is currently on diversion is no pets are available.  Patient being admitted to Hospital Medicine for observation and continue medical management while awaiting transfer in the morning to [facility] for high-level dermatological care.     PCP: Sofi Kennedy           Overview/Hospital Course:  Ms Nicole Collins was admitted with diffuse erythematous pruritic blanching macular rash involving the palms and soles but sparing mucous membranes. She has previously been treated for cellulitis with PO Keflex and Doxy x 10 and has been treated with triamcinolone ointment for suspected atopic dermatitis. Despite this, her rash has worsened, becoming more widespread. Continued triamcinolone ointment upon admission. Pending transfer to [facility] for Dermatology services.         Interval History: Resting in bed.  Complains of some improvement in pain.  As well as rash.  Dermatology following, recommend triamcinolone cream Eucerin cream p.r.n. hydroxyzine for itching.  Seems to be improving.  Punch biopsy also obtained     Objective:      Vital Signs (Most Recent):  Temp: 98 °F (36.7 °C) (05/27/22 2031)  Pulse: 77 (05/27/22 2031)  Resp: 16 (05/27/22 2031)  BP: (!) 171/92 (05/27/22 2031)  SpO2: 99 % (05/27/22 2031)    Vital Signs (24h Range):  Temp:  [98 °F (36.7 °C)-98.8 °F (37.1 °C)] 98 °F (36.7 °C)  Pulse:  [77-87] 77  Resp:  [16-20] 16  SpO2:  [93 %-100 %] 99 %  BP: (100-171)/(54-92) 171/92      Weight: 89.2 kg (196 lb 10.4 oz)  Body mass index is 31.74 kg/m².     Intake/Output Summary (Last 24 hours) at 5/27/2022 2305  Last data filed at 5/27/2022 0500        Physical Exam  Vitals and nursing note reviewed.   Constitutional:       General: She is not in acute distress.     Appearance: She is obese. She is not  toxic-appearing.   HENT:      Head: Normocephalic and atraumatic.      Nose: Nose normal.      Mouth/Throat:      Mouth: Mucous membranes are moist.   Cardiovascular:      Rate and Rhythm: Normal rate and regular rhythm.      Pulses: Normal pulses.      Heart sounds: Normal heart sounds. No murmur heard.    No gallop.   Pulmonary:      Effort: Pulmonary effort is normal. No respiratory distress.      Breath sounds: Normal breath sounds. No wheezing or rales.      Comments: Room air  Abdominal:      General: Bowel sounds are normal. There is no distension.      Palpations: Abdomen is soft.      Tenderness: There is no abdominal tenderness. There is no guarding.   Musculoskeletal:      Right lower leg: Edema present.      Left lower leg: Edema present.   Skin:     Findings: Erythema, lesion and rash present.      Comments: Macular rash on face, trunk, arms, legs   Neurological:      Mental Status: She is alert. Mental status is at baseline.         Significant Labs: All pertinent labs within the past 24 hours have been reviewed.     Significant Imaging: I have reviewed all pertinent imaging results/findings within the past 24 hours.           Assessment/Plan:      * Eczematous dermatitis  Dermatology previously saw the patient and recommended triamcinolone ointment and cream. Patient states that the rash is worsening despite this intervention. She was also previously treated with antibiotics for presumed cellulitis. She has no current signs of infection. She is taking HCTZ and phenytoin, both which can be associated with rash. She does not have mucous membrane involvement. Doubt SJS/TEN/DRESS. No liver/kidney dysfunction.   - continue triamcinolone  - pending transfer to [facility] for Dermatology consult. Called on 5/25 in AM- they are on diversion. Will call again this afternoon.      Derm following, appreciate recs:    Patient with reported 1 month history of worsening pruritic rash with associated edema of the  extremities, not improved after antibiotic course from last hospital admission. Per chart review, has been seen in ED multiple times over past 2-3 years for similar presentation of rash. Has not been applying any topical steroids. No mucous membrane involvement. No new medications or supplements. Afebrile, labs wnl. DDx includes atopic dermatitis vs drug eruption vs allergic contact dermatitis vs CTCL vs malignancy vs psoriasis vs other  - Start triamcinolone 0.1% ointment BID to affected areas on trunk, arms, and legs. Do not use on face, armpits, or groin. Please supply patient with 454g jar at bedside  - Continue regular emollient use at least twice daily with vaseline or Eucerin  - Start hydroxyzine 25 mg TID prn for itching  - Punch biopsy of the right upper arm performed today, as below  - Gelfoam used, no suture removal needed  - Will follow up results  - If no improvement with topical steroids over next few days, consider starting po prednisone  - Patient still needs outpatient follow up at [facility], please notify dermatology prior to discharge to help set up an appt     Hypokalemia  Replace and monitor  Resolved.         Thrombocytopenia  Noted on admit, now resolved.        Macrocytosis  Noted        Class 1 obesity due to excess calories with serious comorbidity and body mass index (BMI) of 30.0 to 30.9 in adult  Body mass index is 32.6 kg/m².  Weight loss as outpatient           Gastroesophageal reflux disease without esophagitis  Chronic. Stable. Currently asymptomatic. Home medications include PPI/Antacids as needed.  Plan:  -Continue H2 blocker        Anxiety and depression  Continue home [facility]  Spiritual care has visited the patient            Erythroderma           Bipolar 1 disorder  Chronic. Stable. Not in acute exacerbation and currently denies endorsing any suicidal/homicidal ideations.   - continue home seroquel        HTN (hypertension)  Continue HCTZ, amlodipine         Seizure  Chronic. Stable  on home medications.  -continue dilantin   -seizure precautions                VTE Risk Mitigation (From admission, onward)                  Ordered        IP VTE HIGH RISK PATIENT  Once         05/24/22 0213        Place sequential compression device  Until discontinued         05/24/22 0213                     Discharge Planning   CHRISTOPHER: 5/28/2022     Code Status: Full Code   Is the patient medically ready for discharge?:     Reason for patient still in hospital (select all that apply): Treatment  Discharge Plan A: Home with family                     Daisy Diaz MD  Department of Hospital Medicine   [facility] - Intensive Care ([place] [address])

## 2022-06-22 ENCOUNTER — HOSPITAL ENCOUNTER (EMERGENCY)
Facility: HOSPITAL | Age: 42
Discharge: HOME | End: 2022-06-22
Attending: EMERGENCY MEDICINE
Payer: MEDICAID

## 2022-06-22 VITALS
HEART RATE: 94 BPM | HEIGHT: 66 IN | RESPIRATION RATE: 18 BRPM | DIASTOLIC BLOOD PRESSURE: 74 MMHG | OXYGEN SATURATION: 100 % | SYSTOLIC BLOOD PRESSURE: 124 MMHG | BODY MASS INDEX: 31.66 KG/M2 | TEMPERATURE: 98 F | WEIGHT: 197 LBS

## 2022-06-22 DIAGNOSIS — L29.9 PRURITUS: ICD-10-CM

## 2022-06-22 DIAGNOSIS — R21 SKIN ERUPTION: Primary | ICD-10-CM

## 2022-06-22 LAB
ALBUMIN SERPL BCP-MCNC: 3.6 G/DL (ref 3.5–5.2)
ALP SERPL-CCNC: 47 U/L (ref 55–135)
ALT SERPL W/O P-5'-P-CCNC: 30 U/L (ref 10–44)
AMPHET+METHAMPHET UR QL: ABNORMAL
ANION GAP SERPL CALC-SCNC: 8 MMOL/L (ref 8–16)
AST SERPL-CCNC: 27 U/L (ref 10–40)
B-HCG UR QL: NEGATIVE
BARBITURATES UR QL SCN>200 NG/ML: NEGATIVE
BASOPHILS # BLD AUTO: 0.03 K/UL (ref 0–0.2)
BASOPHILS NFR BLD: 0.3 % (ref 0–1.9)
BENZODIAZ UR QL SCN>200 NG/ML: NEGATIVE
BILIRUB SERPL-MCNC: 0.7 MG/DL (ref 0.1–1)
BILIRUB UR QL STRIP: NEGATIVE
BUN SERPL-MCNC: 13 MG/DL (ref 6–20)
BZE UR QL SCN: NEGATIVE
CALCIUM SERPL-MCNC: 9.2 MG/DL (ref 8.7–10.5)
CANNABINOIDS UR QL SCN: NEGATIVE
CHLORIDE SERPL-SCNC: 107 MMOL/L (ref 95–110)
CLARITY UR: CLEAR
CO2 SERPL-SCNC: 22 MMOL/L (ref 23–29)
COLOR UR: YELLOW
CREAT SERPL-MCNC: 0.9 MG/DL (ref 0.5–1.4)
CREAT UR-MCNC: 100.5 MG/DL (ref 15–325)
CRP SERPL-MCNC: 21.9 MG/L (ref 0–8.2)
CTP QC/QA: YES
DIFFERENTIAL METHOD: ABNORMAL
EOSINOPHIL # BLD AUTO: 0.6 K/UL (ref 0–0.5)
EOSINOPHIL NFR BLD: 4.7 % (ref 0–8)
ERYTHROCYTE [DISTWIDTH] IN BLOOD BY AUTOMATED COUNT: 12.9 % (ref 11.5–14.5)
ERYTHROCYTE [SEDIMENTATION RATE] IN BLOOD BY WESTERGREN METHOD: 8 MM/HR (ref 0–20)
EST. GFR  (AFRICAN AMERICAN): >60 ML/MIN/1.73 M^2
EST. GFR  (NON AFRICAN AMERICAN): >60 ML/MIN/1.73 M^2
GLUCOSE SERPL-MCNC: 100 MG/DL (ref 70–110)
GLUCOSE UR QL STRIP: NEGATIVE
HCT VFR BLD AUTO: 38.7 % (ref 37–48.5)
HGB BLD-MCNC: 12.9 G/DL (ref 12–16)
HGB UR QL STRIP: NEGATIVE
IMM GRANULOCYTES # BLD AUTO: 0.05 K/UL (ref 0–0.04)
IMM GRANULOCYTES NFR BLD AUTO: 0.4 % (ref 0–0.5)
KETONES UR QL STRIP: NEGATIVE
LEUKOCYTE ESTERASE UR QL STRIP: NEGATIVE
LYMPHOCYTES # BLD AUTO: 2.1 K/UL (ref 1–4.8)
LYMPHOCYTES NFR BLD: 17.5 % (ref 18–48)
MCH RBC QN AUTO: 32.4 PG (ref 27–31)
MCHC RBC AUTO-ENTMCNC: 33.3 G/DL (ref 32–36)
MCV RBC AUTO: 97 FL (ref 82–98)
METHADONE UR QL SCN>300 NG/ML: NEGATIVE
MONOCYTES # BLD AUTO: 0.9 K/UL (ref 0.3–1)
MONOCYTES NFR BLD: 7.4 % (ref 4–15)
NEUTROPHILS # BLD AUTO: 8.2 K/UL (ref 1.8–7.7)
NEUTROPHILS NFR BLD: 69.7 % (ref 38–73)
NITRITE UR QL STRIP: NEGATIVE
NRBC BLD-RTO: 0 /100 WBC
OPIATES UR QL SCN: ABNORMAL
PCP UR QL SCN>25 NG/ML: NEGATIVE
PH UR STRIP: 6 [PH] (ref 5–8)
PLATELET # BLD AUTO: 257 K/UL (ref 150–450)
PMV BLD AUTO: 9.7 FL (ref 9.2–12.9)
POTASSIUM SERPL-SCNC: 4 MMOL/L (ref 3.5–5.1)
PROT SERPL-MCNC: 7.5 G/DL (ref 6–8.4)
PROT UR QL STRIP: NEGATIVE
RBC # BLD AUTO: 3.98 M/UL (ref 4–5.4)
SODIUM SERPL-SCNC: 137 MMOL/L (ref 136–145)
SP GR UR STRIP: 1.01 (ref 1–1.03)
TOXICOLOGY INFORMATION: ABNORMAL
URN SPEC COLLECT METH UR: NORMAL
UROBILINOGEN UR STRIP-ACNC: NEGATIVE EU/DL
WBC # BLD AUTO: 11.81 K/UL (ref 3.9–12.7)

## 2022-06-22 PROCEDURE — 63600175 PHARM REV CODE 636 W HCPCS: Performed by: EMERGENCY MEDICINE

## 2022-06-22 PROCEDURE — 96374 THER/PROPH/DIAG INJ IV PUSH: CPT

## 2022-06-22 PROCEDURE — 85025 COMPLETE CBC W/AUTO DIFF WBC: CPT | Performed by: EMERGENCY MEDICINE

## 2022-06-22 PROCEDURE — 85652 RBC SED RATE AUTOMATED: CPT | Performed by: EMERGENCY MEDICINE

## 2022-06-22 PROCEDURE — 99284 EMERGENCY DEPT VISIT MOD MDM: CPT | Mod: 25

## 2022-06-22 PROCEDURE — 25000003 PHARM REV CODE 250: Performed by: EMERGENCY MEDICINE

## 2022-06-22 PROCEDURE — 80307 DRUG TEST PRSMV CHEM ANLYZR: CPT | Performed by: EMERGENCY MEDICINE

## 2022-06-22 PROCEDURE — 80053 COMPREHEN METABOLIC PANEL: CPT | Performed by: EMERGENCY MEDICINE

## 2022-06-22 PROCEDURE — 81025 URINE PREGNANCY TEST: CPT | Performed by: EMERGENCY MEDICINE

## 2022-06-22 PROCEDURE — 86140 C-REACTIVE PROTEIN: CPT | Performed by: EMERGENCY MEDICINE

## 2022-06-22 PROCEDURE — 81003 URINALYSIS AUTO W/O SCOPE: CPT | Mod: 59 | Performed by: EMERGENCY MEDICINE

## 2022-06-22 RX ORDER — HYDROXYZINE HYDROCHLORIDE 25 MG/1
25 TABLET, FILM COATED ORAL EVERY 6 HOURS PRN
Qty: 25 TABLET | Refills: 0 | Status: SHIPPED | OUTPATIENT
Start: 2022-06-22

## 2022-06-22 RX ORDER — TRIAMCINOLONE ACETONIDE 1 MG/G
OINTMENT TOPICAL ONCE
Status: COMPLETED | OUTPATIENT
Start: 2022-06-22 | End: 2022-06-22

## 2022-06-22 RX ORDER — HYDROXYZINE PAMOATE 25 MG/1
50 CAPSULE ORAL
Status: COMPLETED | OUTPATIENT
Start: 2022-06-22 | End: 2022-06-22

## 2022-06-22 RX ORDER — PREDNISONE 50 MG/1
50 TABLET ORAL DAILY
Qty: 7 TABLET | Refills: 0 | Status: SHIPPED | OUTPATIENT
Start: 2022-06-22 | End: 2022-06-29

## 2022-06-22 RX ORDER — TRIAMCINOLONE ACETONIDE 1 MG/G
OINTMENT TOPICAL ONCE
Status: DISCONTINUED | OUTPATIENT
Start: 2022-06-22 | End: 2022-06-22 | Stop reason: HOSPADM

## 2022-06-22 RX ORDER — DEXAMETHASONE SODIUM PHOSPHATE 4 MG/ML
12 INJECTION, SOLUTION INTRA-ARTICULAR; INTRALESIONAL; INTRAMUSCULAR; INTRAVENOUS; SOFT TISSUE
Status: COMPLETED | OUTPATIENT
Start: 2022-06-22 | End: 2022-06-22

## 2022-06-22 RX ORDER — TRIAMCINOLONE ACETONIDE 1 MG/G
OINTMENT TOPICAL 2 TIMES DAILY
Qty: 453.6 G | Refills: 3 | Status: SHIPPED | OUTPATIENT
Start: 2022-06-22 | End: 2023-02-20 | Stop reason: SDUPTHER

## 2022-06-22 RX ADMIN — DEXAMETHASONE SODIUM PHOSPHATE 12 MG: 4 INJECTION INTRA-ARTICULAR; INTRALESIONAL; INTRAMUSCULAR; INTRAVENOUS; SOFT TISSUE at 06:06

## 2022-06-22 RX ADMIN — HYDROXYZINE PAMOATE 50 MG: 25 CAPSULE ORAL at 06:06

## 2022-06-22 RX ADMIN — TRIAMCINOLONE ACETONIDE: 1 OINTMENT TOPICAL at 06:06

## 2022-06-22 NOTE — ED PROVIDER NOTES
"Encounter Date: 6/22/2022    SCRIBE #1 NOTE: I, Malorie Phelps, am scribing for, and in the presence of,  Roberto Salazar MD. I have scribed the following portions of the note - Other sections scribed: HPI & ROS.       History     Chief Complaint   Patient presents with    Generalized Body Aches     Generalized body aches, mainly in her legs for 2 days. Hx of Lupus     Nicole Collins is a 42 y.o. female, with a PMHx of Lupus, DM, and HTN, who presents to the ED with complaints of bilateral leg swelling and a painful "itchy" rash to entire body that began 4 days ago. Reports rash most prominent on back, abdomen and bilateral legs. Also c/o of fever and chills. Patient reports she was hospitalized for similar symptoms recently (5/23/22 - 6/2/22). Reports swelling of legs subsided while patient was in the hospital but rash did not. Reports being given a jar of triamcinolone acetonide 0.1% ointment upon discharge and directed to apply to body 2 X a day. Patient states she felt relief from ointment, but ran out. When prescribed another refill, describes that the tube was very small. Reports running out of tube quickly and states she has not applied ointment in 4 days. Describes she was feeling relief with use of ointment. Reports taking Benadryl and Ibuprofen 800 mg for symptoms. Patient also compliant with several other regulatory medications. Denies referral to a Dermatologist. No other associated symptoms at this time.       The history is provided by the patient. No  was used.     Review of patient's allergies indicates:   Allergen Reactions    Tangerine Anaphylaxis    Sweet orange(citrus sinensis) Hives    Oranges [orange]      Past Medical History:   Diagnosis Date    Asthma     Bipolar 1 disorder     Chronic hepatitis C without hepatic coma 5/30/2022    Hep C quantitative PCR positive    Cocaine abuse     DM mellitus, gestational     ETOH abuse     Hypertension     Methamphetamine " "abuse     Opiate overdose     Seizures     Trichomonas infection      Past Surgical History:   Procedure Laterality Date     SECTION, LOW TRANSVERSE      x 3    ECTOPIC PREGNANCY SURGERY      KNEE SURGERY  2010    left knee    SALPINGECTOMY      THYROID SURGERY      TUBAL LIGATION       Family History   Problem Relation Age of Onset    Heart disease Mother     Cancer Mother         Breast Cancer    Cancer Father         Throat Cancer    Cancer Maternal Grandmother     Cancer Maternal Grandfather      Social History     Tobacco Use    Smoking status: Current Some Day Smoker     Types: Cigars, Cigarettes    Smokeless tobacco: Never Used    Tobacco comment: Patient states she smokes 1 cigar per week.   Substance Use Topics    Alcohol use: Yes     Comment: binge drinker    Drug use: Yes     Types: Marijuana, "Crack" cocaine, MDMA (Ecstacy), Amphetamines, Cocaine, Methamphetamines, Heroin     Review of Systems   Constitutional: Positive for chills and fever.   HENT: Negative.    Eyes: Negative.    Respiratory: Negative.    Cardiovascular: Positive for leg swelling (bilateral).   Gastrointestinal: Negative.    Genitourinary: Negative.    Musculoskeletal: Negative.    Skin: Positive for rash.   Neurological: Negative.        Physical Exam     Initial Vitals [/ 0514]   BP Pulse Resp Temp SpO2   (!) 190/110 98 18 98.4 °F (36.9 °C) 99 %      MAP       --         Physical Exam    Nursing note and vitals reviewed.  Constitutional: She appears well-developed. She is not diaphoretic. She appears distressed (mildly).   HENT:   Head: Normocephalic and atraumatic.   Nose: Nose normal.   No intraoral lesion noted.   Eyes: EOM are normal. Pupils are equal, round, and reactive to light.   Neck: Neck supple. No JVD present.   Normal range of motion.  Cardiovascular: Normal rate, regular rhythm, normal heart sounds and intact distal pulses.   Pulmonary/Chest: Breath sounds normal. No stridor. No " respiratory distress. She has no wheezes. She has no rhonchi. She has no rales.   Abdominal: Abdomen is soft. Bowel sounds are normal. She exhibits no distension. There is no abdominal tenderness.   Musculoskeletal:         General: No tenderness or edema. Normal range of motion.      Cervical back: Normal range of motion and neck supple.     Neurological: She is alert and oriented to person, place, and time. She has normal strength.   Skin: Skin is warm and dry. Capillary refill takes less than 2 seconds. Rash (Diffuse erythematous and urticarial rash noted to trunk, abdomen, bilateral upper extremities and lower extremities associated with some excoriations.) noted. No erythema.         ED Course   Procedures  Labs Reviewed   CBC W/ AUTO DIFFERENTIAL - Abnormal; Notable for the following components:       Result Value    RBC 3.98 (*)     MCH 32.4 (*)     Gran # (ANC) 8.2 (*)     Immature Grans (Abs) 0.05 (*)     Eos # 0.6 (*)     Lymph % 17.5 (*)     All other components within normal limits   COMPREHENSIVE METABOLIC PANEL - Abnormal; Notable for the following components:    CO2 22 (*)     Alkaline Phosphatase 47 (*)     All other components within normal limits   C-REACTIVE PROTEIN - Abnormal; Notable for the following components:    CRP 21.9 (*)     All other components within normal limits   DRUG SCREEN PANEL, URINE EMERGENCY - Abnormal; Notable for the following components:    Opiate Scrn, Ur Presumptive Positive (*)     Amphetamine Screen, Ur Presumptive Positive (*)     All other components within normal limits    Narrative:     Specimen Source->Urine   URINALYSIS, REFLEX TO URINE CULTURE    Narrative:     Specimen Source->Urine   SEDIMENTATION RATE   POCT URINE PREGNANCY          Imaging Results    None          Medications   triamcinolone acetonide 0.1% ointment (has no administration in time range)   dexamethasone injection 12 mg (12 mg Intravenous Given 6/22/22 0602)   hydrOXYzine pamoate capsule 50 mg  (50 mg Oral Given 6/22/22 0609)   triamcinolone acetonide 0.1% ointment ( Topical (Top) Given 6/22/22 0604)     Medical Decision Making:   History:   Old Medical Records: I decided to obtain old medical records.  Clinical Tests:   Lab Tests: Ordered and Reviewed    MDM:    42-year-old female with past medical history as noted above presents with chills, worsening rash.  Physical exam as noted above.  Upon review dermatology had previously recommended triamcinolone, hydroxyzine p.r.n. pruritus, oral prednisone if this did not improve her symptoms.  Biopsy still pending at this point.  Patient will need dermatology follow-up for further treatment.  ED lab work showing CBC/CMP pending.  Patient given hydroxyzine, triamcinolone/Decadron here with some mild to moderate improvement in symptoms.  At this point time based on physical exam evaluation not suspect SJS/TEN, anaphylaxis, bullous pemphigoid, or any further surgical or medical emergency. Will transition care at this time to Dr. Siddiqui pending additional labwork and disposition.    6:54 AM this is Dr. Celaya.  I took over care of this patient at shift change.  I reviewed the patient's labs.  No significant abnormalities other than elevated CRP which is nonspecific.  I have discussed this case with Dr. Salazar and reviewed the chart and prior notes.  Patient is supposed to be following up with the [facility] which she states she intends to do.  In the meantime, I will prescribe triamcinolone ointment refill as well as oral prednisone and hydroxyzine for pruritus.  Patient does not have any mucosal skin eruption to suggest more serious etiology.  Biopsy has been performed but results not available yet.  I advised the patient to return if she gets worse or new symptoms develop, especially if she develops fever or skin eruption that involves mucosal surfaces.            Scribe Attestation:   Scribe #1: I performed the above scribed service and the documentation  accurately describes the services I performed. I attest to the accuracy of the note.                 Clinical Impression:   Final diagnoses:  [R21] Skin eruption (Primary)  [L29.9] Pruritus       I, Timmy Weinberg Jr, M.D., personally performed the services described in this documentation. All medical record entries made by the scribe were at my direction and in my presence. I have reviewed the chart and agree that the record reflects my personal performance and is accurate and complete.     ED Disposition Condition    Discharge Stable        ED Prescriptions     Medication Sig Dispense Start Date End Date Auth. Provider    predniSONE (DELTASONE) 50 MG Tab Take 1 tablet (50 mg total) by mouth once daily. for 7 days 7 tablet 6/22/2022 6/29/2022 Timmy Weinberg Jr., MD    hydrOXYzine HCL (ATARAX) 25 MG tablet Take 1 tablet (25 mg total) by mouth every 6 (six) hours as needed for Itching (May cause drowsiness. Do not drive when taking.). 25 tablet 6/22/2022  Timmy Weinberg Jr., MD    triamcinolone acetonide 0.1% (KENALOG) 0.1 % ointment Apply topically 2 (two) times daily. for 20 days 453.6 g 6/22/2022 7/12/2022 Timmy Weinberg Jr., MD        Follow-up Information    None          Timmy Weinberg Jr., MD  06/22/22 [id number]

## 2022-06-22 NOTE — ED TRIAGE NOTES
"Pt presents to ED via EMS c/o rash over entire body with "itchy and redness."  Rash noted to back, bilateral arms, bilateral legs,s abd and anterior chest.  Pt reports rash was improving until she "run out my cream," x 4 days.  Pt reports taking Benadryl and  mg with no relief.  Pt also c/o body aches.    "

## 2022-06-22 NOTE — DISCHARGE INSTRUCTIONS
Please follow-up with dermatologist soon as possible into [facility].  Please return if you get worse or if new symptoms develop, especially any fever, chills, or spreading of rash into mucosal areas including your mouth, vaginal area, or eyes.  Please take all medications as prescribed.

## 2022-06-24 ENCOUNTER — TELEPHONE (OUTPATIENT)
Dept: ORTHOPEDICS | Facility: CLINIC | Age: 42
End: 2022-06-24
Payer: MEDICAID

## 2022-06-24 NOTE — TELEPHONE ENCOUNTER
----- Message from Bridgette Flores sent at 6/24/2022  1:13 PM CDT -----  Contact: patient  Patient requesting call back in regards to rescheduling  appointment.         Patient @[phone] (home)       Done & sent

## 2022-06-27 ENCOUNTER — TELEPHONE (OUTPATIENT)
Dept: HEPATOLOGY | Facility: CLINIC | Age: 42
End: 2022-06-27
Payer: MEDICAID

## 2022-06-27 NOTE — TELEPHONE ENCOUNTER
Patient a no-show for scheduled appt with PA Scheuermann on 6/27/22.  She confirmed appt on 6/24/22.  I spoke with her today.  Patient scheduled to see provider again on 7/25/22; appt reminder notice mailed.

## 2022-06-28 ENCOUNTER — CARE AT HOME (OUTPATIENT)
Dept: HOME HEALTH SERVICES | Facility: CLINIC | Age: 42
End: 2022-06-28
Payer: MEDICAID

## 2022-06-28 DIAGNOSIS — L30.8 OTHER ECZEMA: Primary | ICD-10-CM

## 2022-06-28 DIAGNOSIS — E66.09 CLASS 1 OBESITY DUE TO EXCESS CALORIES WITH SERIOUS COMORBIDITY AND BODY MASS INDEX (BMI) OF 30.0 TO 30.9 IN ADULT: ICD-10-CM

## 2022-06-28 DIAGNOSIS — F41.9 ANXIETY AND DEPRESSION: ICD-10-CM

## 2022-06-28 DIAGNOSIS — F32.A ANXIETY AND DEPRESSION: ICD-10-CM

## 2022-06-28 DIAGNOSIS — L29.9 ITCH: ICD-10-CM

## 2022-06-28 PROCEDURE — 99350 HOME/RES VST EST HIGH MDM 60: CPT | Mod: S$GLB,,, | Performed by: NURSE PRACTITIONER

## 2022-06-28 PROCEDURE — 99350 PR HOME VISIT,ESTAB PATIENT,LEVEL IV: ICD-10-PCS | Mod: S$GLB,,, | Performed by: NURSE PRACTITIONER

## 2022-06-30 ENCOUNTER — PATIENT OUTREACH (OUTPATIENT)
Dept: ADMINISTRATIVE | Facility: OTHER | Age: 42
End: 2022-06-30

## 2022-06-30 VITALS
HEART RATE: 71 BPM | DIASTOLIC BLOOD PRESSURE: 80 MMHG | BODY MASS INDEX: 31.66 KG/M2 | TEMPERATURE: 99 F | OXYGEN SATURATION: 100 % | RESPIRATION RATE: 18 BRPM | WEIGHT: 197 LBS | SYSTOLIC BLOOD PRESSURE: 130 MMHG | HEIGHT: 66 IN

## 2022-06-30 RX ORDER — HYDROCORTISONE 25 MG/G
CREAM TOPICAL 2 TIMES DAILY PRN
Qty: 30 G | Refills: 0 | Status: SHIPPED | OUTPATIENT
Start: 2022-06-30 | End: 2022-08-01 | Stop reason: SDUPTHER

## 2022-06-30 NOTE — ASSESSMENT & PLAN NOTE
--current rash subsided  --prn steroid cream ordered for itching/swelling  --referral to derm placed; patient aware  --has prn atarax for itching

## 2022-06-30 NOTE — ASSESSMENT & PLAN NOTE
The patient is obese. Education completed ~ 15 minutes. The patient is asked to make an attempt to improve diet and exercise patterns to aid in medical management of this problem.

## 2022-06-30 NOTE — ASSESSMENT & PLAN NOTE
--has psychiatry on [facility] ([facility])  --continue home meds  --after death of children, has family members staying with her 24/7  --denies suicidal ideation at this time

## 2022-06-30 NOTE — PROGRESS NOTES
[facility] - Initial Contact    Completed the Social Determinant of Health questionnaire with Nicole via telephone today.    Pt identified barriers of most importance are: Patient stated she's in search of a new PCP.  Referrals to community agencies completed with patient/caregiver consent outside of [facility] include: N/A  Referrals were put through [facility] - Yes  Other information discussed the patient needs / wants help with: shared with Nicole that she can connect with Medicaid for Transportation.  Follow up required: 7/6

## 2022-06-30 NOTE — PROGRESS NOTES
[facility]  Transition of Care Home Visit    Visit Date: 6/28/2022  Encounter Provider: Robert Ernandez   PCP:  Sofi Kennedy MD    PRESENTING HISTORY      Patient ID: Nicole Collins is a 42 y.o. female.    Consult Requested By:  No ref. provider found  Reason for Consult:  Hospital Follow Up.    Nicole ROD is being seen at home due to being seen at home due to physical debility that presents a taxing effort to leave the home, to mitigate high risk of hospital readmission and/or due to the limited availability of reliable or safe options for transportation to the point of access to the provider. Prior to treatment on this visit the chart was reviewed and patient verbal consent was obtained.      Chief Complaint: Transitional Care        History of Present Illness: Ms. Nicole Collins is a 42 y.o. female who was recently admitted to the hospital.      HPI:   Nicole Collins is a 41 y.o. female with a PMH  has a past medical history of Asthma, Bipolar 1 disorder, Cocaine abuse, DM mellitus, gestational, ETOH abuse, Hypertension, Methamphetamine abuse, Opiate overdose, Seizures, and Trichomonas infection. who presented to the ED complaining of worsening swelling, erythema, and itching of her lower extremities.  Patient initially presented to the ED earlier this month for similar complaints and was transferred to [facility] at which point she was diagnosed with atopic dermatitis, contact dermatitis, with underlying component of cellulitis.  Patient was evaluated by Dermatology who recommended triamcinolone cream, p.o. antibiotics keflex and doxycycline, steroid cream, p.o. Benadryl, extensive skin care regimen, and outpatient follow-up with Dermatology Clinic at [facility].  Since being discharge back on 05/9/2022, patient reported that her rash on her back, torso, and upper extremities have improved but has had worsening swelling, erythema, and persistent itching throughout her extremities with lower being  greater than upper.  Patient reported completion of antibiotics in addition to compliance with skin care regimen set forth by Dermatology but continues to endorse symptoms as noted above. She reports she has not yet followed up with Dermatology outpatient as directed and has had no relief from over the counter medications.  She denied endorse any fever, chills, sweats, nausea, vomiting, chest pain, shortness of breath, abdominal pain, changes in bowel/bladder habits, or neurological deficits and reported all other review of systems negative except those noted above.  ED staff attempted to have patient transferred to [facility] to be re-evaluated by Dermatology but is currently on diversion is no pets are available.  Patient being admitted to Hospital Medicine for observation and continue medical management while awaiting transfer in the morning to [facility] for high-level dermatological care.     PCP: Sofi Kennedy           * No surgery found *       Hospital Course:   Ms Nicole Collins was admitted with diffuse erythematous pruritic blanching macular rash involving the palms and soles but sparing mucous membranes. She has previously been treated for cellulitis with PO Keflex and Doxy x 10 and has been treated with triamcinolone ointment for suspected atopic dermatitis. Despite this, her rash has worsened, becoming more widespread. Continued triamcinolone ointment upon admission. Transferred to [facility] for Dermatology services. Dermatology seen patient, DDx includes atopic dermatitis vs drug eruption vs allergic contact dermatitis vs CTCL vs malignancy vs psoriasis vs other. Rec start triamcinolone 0.1% ointment BID to affected areas on trunk, arms, and legs. Do not use on face, armpits, or groin. Please supply patient with 454g jar at bedside. Continue regular emollient use at least twice daily with vaseline or Eucerin. Start hydroxyzine 25 mg TID prn for itching. S/p Punch biopsy of the right upper arm performed  "follow up results. If no improvement with topical steroids over next few days, consider starting po prednisone. Rash appears appears to be improving. However on  she complained of severe LLE knee pain. MRI of Lt tib fib- no osteo- mild overlying subQ edema. Patient with pain still present- starting inferior to knee and extending downward- concern for nerve compression. Dose of 100mg gabapentin trialed. F/u apt with derm scheduled- as well as PCP and Hepatitis C clinic for new diagnosis of hep C.     Pt deemed appropriate for discharge. Plan discussed with pt, who was agreeable and amenable; medications were discussed and reviewed, outpatient follow-up scheduled, ER precautions were given, all questions were answered to the pt's satisfaction, and Ms. Collins was subsequently discharged.  ___________________________________________________________________    Today: With this visit today patient is foundambulating aroudn her home with her family member present for the visit. Patient is AAOx3, able to verify her name and . Indicates the skin issues started when her "kids drowned in the [place]." She reports doing well since hospital d/c. Sees psyc on the [facility]; denies suicidal ideation at this time. Has family members living with her . Feels like her mental health is improving. Skin issues are mostly resolved. Recently completed abx regimen. She endorses residual itching and has prn atarax; steroid cream also ordered prn. Reports compliance with medications. Case mgmt consulted for assistance with transportation and estabishing a new PCP.  Patient endorses ability to perform ADls. Endorses eating x 3 meals per day, daily BMs, and adequate sleep pattern.    VSS. Denies fever, chest pain, shortness of breath, nausea, vomiting, diarrhea. Risks of environmental exposure to coronavirus discussed including: social distancing, hand hygiene, and limiting departures from the home for necessities only.  Reports " understanding and willingness to comply.  All hospital discharge orders reviewed and being followed, all medications reconciled and reviewed, patient and family verbalized understanding. No other needs identified at this time.      Attestation: Screening criteria to assess the level of the patient's risk for infection with COVID-19 as recommended by the CDC at the time of the above documented home visit concluded appropriateness to proceed. Universal precautions were maintained at all times, including provider use of 60% alcohol gel hand  immediately prior to entry and upon departing the patient's home.    Review of Systems   Constitutional: Negative for activity change and appetite change.   HENT: Negative for congestion and dental problem.    Eyes: Negative for discharge and itching.   Respiratory: Negative for choking and chest tightness.    Cardiovascular: Negative for chest pain and palpitations.   Gastrointestinal: Negative for rectal pain and vomiting.   Endocrine: Negative for cold intolerance and heat intolerance.   Genitourinary: Negative for enuresis and flank pain.   Musculoskeletal: Negative for myalgias and neck pain.   Skin: Negative for color change and wound.   Allergic/Immunologic: Negative for environmental allergies and food allergies.   Neurological: Negative for tremors and syncope.   Hematological: Does not bruise/bleed easily.   Psychiatric/Behavioral: Negative for decreased concentration and dysphoric mood.        Depression         Assessments:  · Environmental: apt, dimly lit  · Functional Status: independent  · Safety: low fall risk  · Nutritional: adequate  · Home Health/DME/Supplies: none    PAST HISTORY:     Past Medical History:   Diagnosis Date    Asthma     Bipolar 1 disorder     Chronic hepatitis C without hepatic coma 5/30/2022    Hep C quantitative PCR positive    Cocaine abuse     DM mellitus, gestational     ETOH abuse     Hypertension     Methamphetamine abuse  "    Opiate overdose     Seizures     Trichomonas infection        Past Surgical History:   Procedure Laterality Date     SECTION, LOW TRANSVERSE      x 3    ECTOPIC PREGNANCY SURGERY      KNEE SURGERY  2010    left knee    SALPINGECTOMY      THYROID SURGERY      TUBAL LIGATION         Family History   Problem Relation Age of Onset    Heart disease Mother     Cancer Mother         Breast Cancer    Cancer Father         Throat Cancer    Cancer Maternal Grandmother     Cancer Maternal Grandfather        Social History     Socioeconomic History    Marital status: Legally    Tobacco Use    Smoking status: Current Some Day Smoker     Types: Cigars, Cigarettes    Smokeless tobacco: Never Used    Tobacco comment: Patient states she smokes 1 cigar per week.   Substance and Sexual Activity    Alcohol use: Yes     Comment: binge drinker    Drug use: Yes     Types: Marijuana, "Crack" cocaine, MDMA (Ecstacy), Amphetamines, Cocaine, Methamphetamines, Heroin    Sexual activity: Not Currently       MEDICATIONS & ALLERGIES:     Current Outpatient Medications on File Prior to Visit   Medication Sig Dispense Refill    acetaminophen (TYLENOL) 325 MG tablet Take 2 tablets (650 mg total) by mouth every 6 (six) hours as needed for Pain (or fever). (Patient not taking: Reported on 2022) 60 tablet 0    albuterol sulfate 2.5 mg/0.5 mL Nebu Take 2.5 mg by nebulization every 4 (four) hours as needed (cough and wheezing). Rescue 25 each 0    ALPRAZolam (XANAX) 1 MG tablet Take 1 mg by mouth.      amLODIPine (NORVASC) 10 MG tablet Take 1 tablet (10 mg total) by mouth once daily. 30 tablet 0    cetirizine (ZYRTEC) 10 MG tablet Take 1 tablet (10 mg total) by mouth once daily. 30 tablet 0    dextroamphetamine-amphetamine 30 mg Tab Take 1 tablet by mouth 2 (two) times daily.      EScitalopram oxalate (LEXAPRO) 20 MG tablet Take 20 mg by mouth every morning.      famotidine (PEPCID) 20 MG tablet Take " 1 tablet (20 mg total) by mouth 2 (two) times daily. (Patient not taking: Reported on 2022) 20 tablet 0    hydroCHLOROthiazide (HYDRODIURIL) 25 MG tablet Take 1 tablet (25 mg total) by mouth once daily. 30 tablet 0    hydrOXYzine HCL (ATARAX) 25 MG tablet Take 1 tablet (25 mg total) by mouth every 6 (six) hours as needed for Itching (May cause drowsiness. Do not drive when taking.). 25 tablet 0    ibuprofen (ADVIL,MOTRIN) 600 MG tablet Take 1 tablet (600 mg total) by mouth every 6 (six) hours as needed for Pain. 20 tablet 0    menthol 3.2 mg Lozg by Mucous Membrane route.      methocarbamol (ROBAXIN) 500 MG Tab Take 500 mg by mouth 4 (four) times daily.      naloxone (NARCAN) 4 mg/actuation Spry 4mg by nasal route as needed for opioid overdose; may repeat every 2-3 minutes in alternating nostrils until medical help arrives. Call 911 (Patient not taking: Reported on 2022) 1 each 11    ondansetron (ZOFRAN-ODT) 4 MG TbDL Take 1 tablet (4 mg total) by mouth every 8 (eight) hours as needed. (Patient not taking: Reported on 2022) 12 tablet 0    phenytoin (DILANTIN) 100 MG ER capsule Take 1 capsule (100 mg total) by mouth 3 (three) times daily. 90 capsule 11    [] predniSONE (DELTASONE) 50 MG Tab Take 1 tablet (50 mg total) by mouth once daily. for 7 days 7 tablet 0    promethazine (PHENERGAN) 25 MG tablet Take 1 tablet (25 mg total) by mouth every 6 (six) hours as needed for Nausea. 6 tablet 0    quetiapine (SEROQUEL) 50 MG tablet Take 400 mg by mouth every evening.      triamcinolone acetonide 0.1% (KENALOG) 0.1 % ointment Apply topically 2 (two) times daily. for 20 days 453.6 g 3    [DISCONTINUED] cloNIDine (CATAPRES) 0.1 MG tablet Take 0.1 mg by mouth every evening.      [DISCONTINUED] losartan-hydrochlorothiazide 100-12.5 mg (HYZAAR) 100-12.5 mg Tab Take 1 tablet by mouth once daily.       No current facility-administered medications on file prior to visit.        Review of patient's  allergies indicates:   Allergen Reactions    Tangerine Anaphylaxis    Sweet orange(citrus sinensis) Hives    Oranges [orange]        OBJECTIVE:     Vital Signs:  Vitals:    06/30/22 1111   BP: 130/80   Pulse: 71   Resp: 18   Temp: 98.8 °F (37.1 °C)     Body mass index is 31.8 kg/m².     Physical Exam:  Physical Exam  Vitals reviewed.   Constitutional:       Appearance: She is well-developed. She is obese.   HENT:      Head: Normocephalic and atraumatic.   Eyes:      Pupils: Pupils are equal, round, and reactive to light.   Cardiovascular:      Rate and Rhythm: Normal rate.   Pulmonary:      Effort: Pulmonary effort is normal.      Breath sounds: Normal breath sounds.   Abdominal:      General: Bowel sounds are normal.      Palpations: Abdomen is soft.   Musculoskeletal:         General: Normal range of motion.      Cervical back: Normal range of motion and neck supple.   Skin:     General: Skin is warm and dry.      Comments: --eczema resolved   Neurological:      Mental Status: She is alert and oriented to person, place, and time. Mental status is at baseline.      GCS: GCS eye subscore is 4. GCS verbal subscore is 5. GCS motor subscore is 6.   Psychiatric:         Attention and Perception: Attention normal.         Mood and Affect: Mood normal.         Speech: Speech normal.         Laboratory  Lab Results   Component Value Date    WBC 11.81 06/22/2022    HGB 12.9 06/22/2022    HCT 38.7 06/22/2022    MCV 97 06/22/2022     06/22/2022     Lab Results   Component Value Date    INR 1.0 10/02/2017    INR 1.0 09/28/2014    INR 1.1 11/10/2011     No results found for: HGBA1C  No results for input(s): POCTGLUCOSE in the last 72 hours.        TRANSITION OF CARE:     [facility] Contact Note: 6/2/22    Family and/or Caretaker present at visit?  Yes.  Diagnostic tests reviewed/disposition: No diagnosic tests pending after this hospitalization.  Disease/illness education: Importance of compliance with all  prescribed medication and treatments, COVID precautions/Social Distancing/Mask Use  Home health/community services discussion/referrals: Patient does not have home health established from hospital visit.  They do not need home health.  If needed, we will set up home health for the patient.   Establishment or re-establishment of referral orders for community resources: No other necessary community resources.   Discussion with other health care providers: No discussion with other health care providers necessary.     Transition of Care Visit:  I have reviewed and updated the history and problem list.  I have reconciled the medication list.  I have discussed the hospitalization and current medical issues, prognosis and plans with the patient/family.  I  spent more than 50% of time discussing the care with the patient/family.  Total Face-to-Face Encounter: 60 minutes.    Medications Reconciliation:   I have reconciled the patient's home medications and discharge medications with the patient/family. I have updated all changes.  Refer to After-Visit Medication List.    I have discussed discharge plans, follow-up instructions, future appointments, provider contact information, indicators to seek medical emergency treatment, and advisement to call with additional questions or concerns. Patient and caregiver verbalize understanding.    ASSESSMENT & PLAN:       1. Other eczema  Assessment & Plan:  --current rash subsided  --prn steroid cream ordered for itching/swelling  --referral to derm placed; patient aware  --has prn atarax for itching    Orders:  -     Ambulatory referral/consult to Dermatology  -     Ambulatory referral/consult to Outpatient Case Management    2. Itch  -     hydrocortisone 2.5 % cream    3. Anxiety and depression  Assessment & Plan:  --has psychiatry on [facility]  --continue home meds  --after death of children, has family members staying with her 24/7  --denies suicidal ideation at this  time    Orders:  -     Ambulatory referral/consult to Outpatient Case Management    4. Class 1 obesity due to excess calories with serious comorbidity and body mass index (BMI) of 30.0 to 30.9 in adult  Assessment & Plan:  The patient is obese. Education completed ~ 15 minutes. The patient is asked to make an attempt to improve diet and exercise patterns to aid in medical management of this problem.           Were controlled substances prescribed?  No    Instructions for the patient:    Scheduled Follow-up :  Future Appointments   Date Time Provider Department Center   7/8/2022 10:40 AM Dillon Bowles MD [facility] [place]   7/25/2022  8:40 AM Jennifer B. Scheuermann, PA [facility]       After Visit Medication List :     Medication List          Accurate as of June 28, 2022 11:59 PM. If you have any questions, ask your nurse or doctor.            START taking these medications    hydrocortisone 2.5 % cream  Apply topically 2 (two) times daily as needed (itching). Apply to affected area twice daily.  Started by: Robert Ernandez NP        CONTINUE taking these medications    acetaminophen 325 MG tablet  Commonly known as: TYLENOL  Take 2 tablets (650 mg total) by mouth every 6 (six) hours as needed for Pain (or fever).     albuterol sulfate 2.5 mg/0.5 mL Nebu  Take 2.5 mg by nebulization every 4 (four) hours as needed (cough and wheezing). Rescue     ALPRAZolam 1 MG tablet  Commonly known as: XANAX     amLODIPine 10 MG tablet  Commonly known as: NORVASC  Take 1 tablet (10 mg total) by mouth once daily.     cetirizine 10 MG tablet  Commonly known as: ZYRTEC  Take 1 tablet (10 mg total) by mouth once daily.     dextroamphetamine-amphetamine 30 mg Tab     EScitalopram oxalate 20 MG tablet  Commonly known as: LEXAPRO     famotidine 20 MG tablet  Commonly known as: PEPCID  Take 1 tablet (20 mg total) by mouth 2 (two) times daily.     hydroCHLOROthiazide 25 MG tablet  Commonly known as: HYDRODIURIL  Take 1  tablet (25 mg total) by mouth once daily.     hydrOXYzine HCL 25 MG tablet  Commonly known as: ATARAX  Take 1 tablet (25 mg total) by mouth every 6 (six) hours as needed for Itching (May cause drowsiness. Do not drive when taking.).     ibuprofen 600 MG tablet  Commonly known as: ADVIL,MOTRIN  Take 1 tablet (600 mg total) by mouth every 6 (six) hours as needed for Pain.     menthol 3.2 mg Lozg     methocarbamoL 500 MG Tab  Commonly known as: ROBAXIN     naloxone 4 mg/actuation Spry  Commonly known as: NARCAN  4mg by nasal route as needed for opioid overdose; may repeat every 2-3 minutes in alternating nostrils until medical help arrives. Call 911     ondansetron 4 MG Tbdl  Commonly known as: ZOFRAN-ODT  Take 1 tablet (4 mg total) by mouth every 8 (eight) hours as needed.     phenytoin 100 MG ER capsule  Commonly known as: DILANTIN  Take 1 capsule (100 mg total) by mouth 3 (three) times daily.     predniSONE 50 MG Tab  Commonly known as: DELTASONE  Take 1 tablet (50 mg total) by mouth once daily. for 7 days     promethazine 25 MG tablet  Commonly known as: PHENERGAN  Take 1 tablet (25 mg total) by mouth every 6 (six) hours as needed for Nausea.     QUEtiapine 50 MG tablet  Commonly known as: SEROQUEL     triamcinolone acetonide 0.1% 0.1 % ointment  Commonly known as: KENALOG  Apply topically 2 (two) times daily. for 20 days           Where to Get Your Medications      These medications were sent to [facility] [address]  [place] [address]    Hours: Mon-Fri, 8a-5:30p Phone: [phone]   · hydrocortisone 2.5 % cream         Signature: Robert Ernandez NP

## 2022-07-26 ENCOUNTER — TELEPHONE (OUTPATIENT)
Dept: HEPATOLOGY | Facility: CLINIC | Age: 42
End: 2022-07-26
Payer: MEDICAID

## 2022-07-26 NOTE — TELEPHONE ENCOUNTER
Patient a no show for scheduled appt with PA Scheuermann on 7/25/22.  Attempt made to reach her regarding appt on 7/22/22 and 7/26/22.  LVM today asking that she call back for scheduling and sent a letter stating the same.

## 2022-08-01 RX ORDER — HYDROCORTISONE 25 MG/G
CREAM TOPICAL 2 TIMES DAILY PRN
Qty: 30 G | Refills: 0 | Status: SHIPPED | OUTPATIENT
Start: 2022-08-01

## 2022-08-24 NOTE — PROGRESS NOTES
CH[facility] - Follow Up    Completed a follow up visit with Nicole via telephone  today.  Pt reported: doing ok, still needs a PCP and doesn't like the cream she's getting for her eczema.  Community Health Worker provided: encouraged Nicole to call member services to find PCP and then discuss medical cream to see if it's the right kind  Follow-up Outreach - Due: 9/2/2022

## 2022-10-07 ENCOUNTER — PATIENT OUTREACH (OUTPATIENT)
Dept: ADMINISTRATIVE | Facility: OTHER | Age: 42
End: 2022-10-07
Payer: MEDICAID

## 2022-10-07 NOTE — PROGRESS NOTES
CHW - Follow Up    Completed a follow up visit with Nicole via telephone  today.  Pt reported: calling [facility] to get PCP and to get better cream. Has had a lot going on.  Community Health Worker provided: will follow up next week if I've not heard from Nicole regarding finding a PCP  Follow-up Outreach - Due: 10/13/2022

## 2022-10-13 ENCOUNTER — PATIENT OUTREACH (OUTPATIENT)
Dept: ADMINISTRATIVE | Facility: OTHER | Age: 42
End: 2022-10-13
Payer: MEDICAID

## 2022-10-13 NOTE — PROGRESS NOTES
CHW - Follow Up    Completed a follow up visit with Nicole via telephone  today.  Pt reported: wanted number to appt scheduling again which I provided and not worried about the cream until she speaks with her doctor.   Pt denied any additional needs at this time and agrees with episode closure at this time.    Provided patient with my contact information and encouraged her to contact me if additional needs arise.

## 2023-02-19 ENCOUNTER — HOSPITAL ENCOUNTER (EMERGENCY)
Facility: HOSPITAL | Age: 43
Discharge: HOME | End: 2023-02-20
Attending: STUDENT IN AN ORGANIZED HEALTH CARE EDUCATION/TRAINING PROGRAM
Payer: MEDICAID

## 2023-02-19 DIAGNOSIS — R21 RASH: Primary | ICD-10-CM

## 2023-02-19 LAB
ALBUMIN SERPL BCP-MCNC: 3.7 G/DL (ref 3.5–5.2)
ALP SERPL-CCNC: 60 U/L (ref 55–135)
ALT SERPL W/O P-5'-P-CCNC: 33 U/L (ref 10–44)
ANION GAP SERPL CALC-SCNC: 11 MMOL/L (ref 8–16)
AST SERPL-CCNC: 42 U/L (ref 10–40)
BASOPHILS # BLD AUTO: 0.02 K/UL (ref 0–0.2)
BASOPHILS NFR BLD: 0.3 % (ref 0–1.9)
BILIRUB SERPL-MCNC: 0.6 MG/DL (ref 0.1–1)
BNP SERPL-MCNC: 42 PG/ML (ref 0–99)
BUN SERPL-MCNC: 7 MG/DL (ref 6–20)
CALCIUM SERPL-MCNC: 8.7 MG/DL (ref 8.7–10.5)
CHLORIDE SERPL-SCNC: 108 MMOL/L (ref 95–110)
CO2 SERPL-SCNC: 21 MMOL/L (ref 23–29)
CREAT SERPL-MCNC: 0.8 MG/DL (ref 0.5–1.4)
CRP SERPL-MCNC: 20.4 MG/L (ref 0–8.2)
CTP QC/QA: YES
CTP QC/QA: YES
DIFFERENTIAL METHOD: ABNORMAL
EOSINOPHIL # BLD AUTO: 0.5 K/UL (ref 0–0.5)
EOSINOPHIL NFR BLD: 7.2 % (ref 0–8)
ERYTHROCYTE [DISTWIDTH] IN BLOOD BY AUTOMATED COUNT: 12.5 % (ref 11.5–14.5)
ERYTHROCYTE [SEDIMENTATION RATE] IN BLOOD BY WESTERGREN METHOD: 20 MM/HR (ref 0–20)
EST. GFR  (NO RACE VARIABLE): >60 ML/MIN/1.73 M^2
GLUCOSE SERPL-MCNC: 88 MG/DL (ref 70–110)
HCT VFR BLD AUTO: 37.7 % (ref 37–48.5)
HGB BLD-MCNC: 12.4 G/DL (ref 12–16)
IMM GRANULOCYTES # BLD AUTO: 0.02 K/UL (ref 0–0.04)
IMM GRANULOCYTES NFR BLD AUTO: 0.3 % (ref 0–0.5)
LYMPHOCYTES # BLD AUTO: 1.6 K/UL (ref 1–4.8)
LYMPHOCYTES NFR BLD: 21.6 % (ref 18–48)
MCH RBC QN AUTO: 32.2 PG (ref 27–31)
MCHC RBC AUTO-ENTMCNC: 32.9 G/DL (ref 32–36)
MCV RBC AUTO: 98 FL (ref 82–98)
MONOCYTES # BLD AUTO: 0.6 K/UL (ref 0.3–1)
MONOCYTES NFR BLD: 8.6 % (ref 4–15)
NEUTROPHILS # BLD AUTO: 4.5 K/UL (ref 1.8–7.7)
NEUTROPHILS NFR BLD: 62 % (ref 38–73)
NRBC BLD-RTO: 0 /100 WBC
PLATELET # BLD AUTO: 240 K/UL (ref 150–450)
PMV BLD AUTO: 9.8 FL (ref 9.2–12.9)
POC MOLECULAR INFLUENZA A AGN: NEGATIVE
POC MOLECULAR INFLUENZA B AGN: NEGATIVE
POTASSIUM SERPL-SCNC: 3.5 MMOL/L (ref 3.5–5.1)
PROT SERPL-MCNC: 7.5 G/DL (ref 6–8.4)
RBC # BLD AUTO: 3.85 M/UL (ref 4–5.4)
SARS-COV-2 RDRP RESP QL NAA+PROBE: NEGATIVE
SODIUM SERPL-SCNC: 140 MMOL/L (ref 136–145)
WBC # BLD AUTO: 7.23 K/UL (ref 3.9–12.7)

## 2023-02-19 PROCEDURE — 83880 ASSAY OF NATRIURETIC PEPTIDE: CPT | Performed by: STUDENT IN AN ORGANIZED HEALTH CARE EDUCATION/TRAINING PROGRAM

## 2023-02-19 PROCEDURE — 85025 COMPLETE CBC W/AUTO DIFF WBC: CPT | Performed by: STUDENT IN AN ORGANIZED HEALTH CARE EDUCATION/TRAINING PROGRAM

## 2023-02-19 PROCEDURE — 85652 RBC SED RATE AUTOMATED: CPT | Performed by: STUDENT IN AN ORGANIZED HEALTH CARE EDUCATION/TRAINING PROGRAM

## 2023-02-19 PROCEDURE — 81000 URINALYSIS NONAUTO W/SCOPE: CPT | Performed by: STUDENT IN AN ORGANIZED HEALTH CARE EDUCATION/TRAINING PROGRAM

## 2023-02-19 PROCEDURE — 99284 EMERGENCY DEPT VISIT MOD MDM: CPT | Mod: 25

## 2023-02-19 PROCEDURE — 80053 COMPREHEN METABOLIC PANEL: CPT | Performed by: STUDENT IN AN ORGANIZED HEALTH CARE EDUCATION/TRAINING PROGRAM

## 2023-02-19 PROCEDURE — 86140 C-REACTIVE PROTEIN: CPT | Performed by: STUDENT IN AN ORGANIZED HEALTH CARE EDUCATION/TRAINING PROGRAM

## 2023-02-20 VITALS
HEART RATE: 100 BPM | DIASTOLIC BLOOD PRESSURE: 78 MMHG | SYSTOLIC BLOOD PRESSURE: 151 MMHG | OXYGEN SATURATION: 99 % | RESPIRATION RATE: 18 BRPM | WEIGHT: 195 LBS | HEIGHT: 66 IN | TEMPERATURE: 98 F | BODY MASS INDEX: 31.34 KG/M2

## 2023-02-20 LAB
BACTERIA #/AREA URNS HPF: NORMAL /HPF
BILIRUB UR QL STRIP: NEGATIVE
CLARITY UR: CLEAR
COLOR UR: YELLOW
GLUCOSE UR QL STRIP: NEGATIVE
HGB UR QL STRIP: NEGATIVE
HYALINE CASTS #/AREA URNS LPF: 0 /LPF
KETONES UR QL STRIP: ABNORMAL
LEUKOCYTE ESTERASE UR QL STRIP: NEGATIVE
MICROSCOPIC COMMENT: NORMAL
NITRITE UR QL STRIP: NEGATIVE
PH UR STRIP: 6 [PH] (ref 5–8)
PROT UR QL STRIP: ABNORMAL
RBC #/AREA URNS HPF: 3 /HPF (ref 0–4)
SP GR UR STRIP: >1.03 (ref 1–1.03)
URN SPEC COLLECT METH UR: ABNORMAL
UROBILINOGEN UR STRIP-ACNC: ABNORMAL EU/DL
WBC #/AREA URNS HPF: 0 /HPF (ref 0–5)

## 2023-02-20 PROCEDURE — 25000003 PHARM REV CODE 250: Performed by: STUDENT IN AN ORGANIZED HEALTH CARE EDUCATION/TRAINING PROGRAM

## 2023-02-20 RX ORDER — AMLODIPINE BESYLATE 10 MG/1
10 TABLET ORAL DAILY
Qty: 30 TABLET | Refills: 1 | Status: SHIPPED | OUTPATIENT
Start: 2023-02-20 | End: 2023-04-21

## 2023-02-20 RX ORDER — PREDNISONE 20 MG/1
40 TABLET ORAL DAILY
Qty: 10 TABLET | Refills: 0 | Status: SHIPPED | OUTPATIENT
Start: 2023-02-20 | End: 2023-02-25

## 2023-02-20 RX ORDER — HYDROXYZINE PAMOATE 25 MG/1
25 CAPSULE ORAL EVERY 4 HOURS PRN
Qty: 30 CAPSULE | Refills: 1 | Status: SHIPPED | OUTPATIENT
Start: 2023-02-20

## 2023-02-20 RX ORDER — METHOCARBAMOL 500 MG/1
500 TABLET, FILM COATED ORAL
Status: COMPLETED | OUTPATIENT
Start: 2023-02-20 | End: 2023-02-20

## 2023-02-20 RX ORDER — TRIAMCINOLONE ACETONIDE 1 MG/G
OINTMENT TOPICAL 2 TIMES DAILY
Qty: 453.6 G | Refills: 1 | Status: SHIPPED | OUTPATIENT
Start: 2023-02-20 | End: 2023-03-12

## 2023-02-20 RX ORDER — CEPHALEXIN 500 MG/1
500 CAPSULE ORAL 4 TIMES DAILY
Qty: 20 CAPSULE | Refills: 0 | Status: SHIPPED | OUTPATIENT
Start: 2023-02-20 | End: 2023-02-25

## 2023-02-20 RX ADMIN — METHOCARBAMOL 500 MG: 500 TABLET ORAL at 12:02

## 2023-02-20 NOTE — DISCHARGE INSTRUCTIONS
Thank you for coming to our Emergency Department today. It is important to remember that some problems or medical conditions are difficult to diagnose and may not be found during your Emergency Department visit.     Be sure to follow up with your primary care doctor and review all labs/imaging/tests that were performed during your ER visit with them. Some labs/tests may be outside of the normal range and require non-emergent follow-up and further investigation to help diagnose/exclude/prevent complications or other potentially serious medical conditions that were not addressed during your ER visit.    If you do not have a primary care doctor, you may contact the one listed on your discharge paperwork or you may also call the [facility] Appointment Desk at [phone] to schedule an appointment and establish care with one. It is important to your health that you have a primary care doctor.    Please take all medications as directed. All medications may potentially have side-effects and it is impossible to predict which medications may give you side-effects or what side-effects (if any) they will give you.. If you feel that you are having a negative effect or side-effect of any medication you should immediately stop taking them and seek medical attention. If you feel that you are having a life-threatening reaction call 911.    Return to the ER with any questions/concerns, new/concerning symptoms, worsening or failure to improve.     Do not drive, swim, climb to height, take a bath, operate heavy machinery, drink alcohol or take potentially sedating medications, sign any legal documents or make any important decisions for 24 hours if you have received any pain medications, sedatives or mood altering drugs during your ER visit or within 24 hours of taking them if they have been prescribed to you.     You can find additional resources for Dentists, hearing aids, durable medical equipment, low cost pharmacies and  other resources at https://[facility].org    BELOW THIS LINE ONLY APPLIES IF YOU HAVE A COVID TEST PENDING OR IF YOU HAVE BEEN DIAGNOSED WITH COVID:  Please access [facility] to review the results of your test. Until the results of your COVID test return, you should isolate yourself so as not to potentially spread illness to others.   If your COVID test returns positive, you should isolate yourself so as not to spread illness to others. After five full days, if you are feeling better and you have not had fever for 24 hours, you can return to your typical daily activities, but you must wear a mask around others for an additional 5 days.   If your COVID test returns negative and you are either unvaccinated or more than six months out from your two-dose vaccine and are not yet boosted, you should still quarantine for 5 full days followed by strict mask use for an additional 5 full days.   If your COVID test returns negative and you have received your 2-dose initial vaccine as well as a booster, you should continue strict mask use for 10 full days after the exposure.  For all those exposed, best practice includes a test at day 5 after the exposure. This can be a home test or a test through one of the many testing centers throughout our community.   Masking is always advised to limit the spread of COVID. Cdc.gov is an excellent site to obtain the latest up to date recommendations regarding COVID and COVID testing.     CDC Testing and Quarantine Guidelines for patients with exposure to a known-positive COVID-19 person:  A close exposure is defined as anyone who has had an exposure (masked or unmasked) to a known COVID -19 positive person within 6 feet of someone for a cumulative total of 15 minutes or more over a 24-hour period.   Vaccinated and/or if you recently had a positive covid test within 90 days do NOT need to quarantine after contact with someone who had COVID-19 unless you develop symptoms.   Fully vaccinated  people who have not had a positive test within 90 days, should get tested 3-5 days after their exposure, even if they don't have symptoms and wear a mask indoors in public for 14 days following exposure or until their test result is negative.      Unvaccinated and/or NOT had a positive test within 90 days and meet close exposure  You are required by CDC guidelines to quarantine for at least 5 days from time of exposure followed by 5 days of strict masking. It is recommended, but not required to test after 5 days, unless you develop symptoms, in which case you should test at that time.  If you get tested after 5 days and your test is positive, your 5 day period of isolation starts the day of the positive test.    If your exposure does not meet the above definition, you can return to your normal daily activities to include social distancing, wearing a mask and frequent handwashing.      Here is a link to guidance from the CDC:  https://www.cdc.gov/media/releases/2021/s1227-isolation-quarantine-guidance.html      [place] Dept Of Health Testing Sites:  https://ldh.la.gov/page/3934      [facility] website with testing locations and guidance:  [email or web address]

## 2023-02-20 NOTE — ED TRIAGE NOTES
Pt bib ambulance from home with c/o generalized bodyaches, redness and swelling to entire body.  Pt has hx of lupus and states that she is having a flare up

## 2023-02-20 NOTE — ED PROVIDER NOTES
"Encounter Date: 2/19/2023    SCRIBE #1 NOTE: I, Ashleigh Aly, am scribing for, and in the presence of,  Michelle Mrorison DO. I have scribed the following portions of the note - Other sections scribed: HPI/ROS/PE.     History     Chief Complaint   Patient presents with    Generalized Body Aches     Pt arrived via ems, pt chief complaint Is generalized body aches. Pt states has lupus and has been having pain for 3 days.      Nicole Collins is a 42 y.o. female, with a PMHx of Lupus Nephritis, Seizures, HTN, Thyroid cancer (status post-resection), Bipolar 1 Disorder, Schizophrenia who presents to the ED with generalized myalgia onset 3 days. Pt reports Lupus flares with associated skin changes- painful, diffuse erythematous rash, pt notes skin feels like "its flammable". These symptoms have been experienced during a prior lupus flares. Pt reports left eye swelling which hasn't happened with a flares in the past. Pt also reports emesis onset yesterday and notes her legs are "heavy, like elephants legs". Pt notes she is supposed to be on steroids and steroid cream for skin but ran out yesterday. Pt endorses being out of all medication, a home health nurse normally handles her refills. No other exacerbating or alleviating factors. Pt had similar Lupus flare last year 6/22/22. Pt given jar of triamcinolone acetonide 0.1% ointment upon discharge. Pt found alleviation of pain with this treatment. Pt denied referral to dermatology at that time. Patient denies chest pain, shortness of breath, fever, chills, dysuria, hematuria, dyschezia,, or other associated symptoms. This is the extent of the patient's complaints today in the Emergency Department.        The history is provided by the patient.   Review of patient's allergies indicates:   Allergen Reactions    Tangerine Anaphylaxis    Sweet orange(citrus sinensis) Hives    Oranges [orange]      Past Medical History:   Diagnosis Date    Asthma     Bipolar 1 disorder     " "Chronic hepatitis C without hepatic coma 2022    Hep C quantitative PCR positive    Cocaine abuse     DM mellitus, gestational     ETOH abuse     Hypertension     Methamphetamine abuse     Opiate overdose     Seizures     Systemic lupus erythematosus, organ or system involvement unspecified     Trichomonas infection      Past Surgical History:   Procedure Laterality Date     SECTION, LOW TRANSVERSE      x 3    ECTOPIC PREGNANCY SURGERY      KNEE SURGERY  2010    left knee    SALPINGECTOMY      THYROID SURGERY      TUBAL LIGATION       Family History   Problem Relation Age of Onset    Heart disease Mother     Cancer Mother         Breast Cancer    Cancer Father         Throat Cancer    Cancer Maternal Grandmother     Cancer Maternal Grandfather      Social History     Tobacco Use    Smoking status: Some Days     Types: Cigars, Cigarettes    Smokeless tobacco: Never    Tobacco comments:     Patient states she smokes 1 cigar per week.   Substance Use Topics    Alcohol use: Yes     Comment: binge drinker    Drug use: Yes     Types: Marijuana, "Crack" cocaine, MDMA (Ecstacy), Amphetamines, Cocaine, Methamphetamines, Heroin     Review of Systems   Constitutional:  Negative for chills and fever.   HENT:  Negative for facial swelling and sore throat.    Eyes:  Negative for visual disturbance.   Respiratory:  Negative for cough and shortness of breath.    Cardiovascular:  Negative for chest pain and palpitations.   Gastrointestinal:  Positive for vomiting. Negative for abdominal pain and nausea.   Genitourinary:  Negative for dysuria and hematuria.   Musculoskeletal:  Positive for myalgias. Negative for back pain.   Skin:  Negative for rash.        Positive for painful erythematous rash.   Neurological:  Negative for speech difficulty, weakness, light-headedness, numbness and headaches.   Hematological:  Does not bruise/bleed easily.   Psychiatric/Behavioral: Negative.  Negative for confusion.      Physical " Exam     Initial Vitals [02/19/23 2030]   BP Pulse Resp Temp SpO2   (!) 180/100 100 18 98.5 °F (36.9 °C) 99 %      MAP       --         Physical Exam    Nursing note and vitals reviewed.  Constitutional: She appears well-developed. She is not diaphoretic.  Non-toxic appearance. She does not have a sickly appearance. She does not appear ill.   Appears distressed due to pain.    HENT:   Head: Normocephalic and atraumatic.   Right Ear: External ear normal.   Left Ear: External ear normal.   Nose: Nose normal.   Mouth/Throat: Uvula is midline, oropharynx is clear and moist and mucous membranes are normal. No oral lesions. No trismus in the jaw. No dental abscesses, uvula swelling or lacerations. No oropharyngeal exudate.   Eyes: Conjunctivae are normal. No scleral icterus.   Neck: Neck supple. No tracheal deviation present. No JVD present.   Normal range of motion.  Cardiovascular:  Normal rate, regular rhythm, normal heart sounds and intact distal pulses.           Pulmonary/Chest: Breath sounds normal. No respiratory distress.   Abdominal: Abdomen is soft. She exhibits no distension. There is no abdominal tenderness. There is no rebound and no guarding.   Musculoskeletal:         General: No tenderness or edema. Normal range of motion.      Cervical back: Normal range of motion and neck supple.     Neurological: She is alert and oriented to person, place, and time. She has normal strength. She displays no atrophy and no tremor. She exhibits normal muscle tone. She displays no seizure activity. Gait normal.   Moves all extremities, follows all commands, no focal neurologic deficits.      Skin: Skin is warm and dry. Rash noted.   Diffuse erythematous rash noted to head, neck, trunk, back,and bilateral UE and LE.   Psychiatric: She has a normal mood and affect.       ED Course   Procedures  Labs Reviewed   CBC W/ AUTO DIFFERENTIAL - Abnormal; Notable for the following components:       Result Value    RBC 3.85 (*)      MCH 32.2 (*)     All other components within normal limits   COMPREHENSIVE METABOLIC PANEL - Abnormal; Notable for the following components:    CO2 21 (*)     AST 42 (*)     All other components within normal limits   C-REACTIVE PROTEIN - Abnormal; Notable for the following components:    CRP 20.4 (*)     All other components within normal limits   URINALYSIS, REFLEX TO URINE CULTURE - Abnormal; Notable for the following components:    Specific Gravity, UA >1.030 (*)     Protein, UA 1+ (*)     Ketones, UA 3+ (*)     Urobilinogen, UA 2.0-3.0 (*)     All other components within normal limits    Narrative:     Specimen Source->Urine   SEDIMENTATION RATE   B-TYPE NATRIURETIC PEPTIDE   URINALYSIS MICROSCOPIC    Narrative:     Specimen Source->Urine   SARS-COV-2 RDRP GENE   POCT INFLUENZA A/B MOLECULAR          Imaging Results    None          Medications   methocarbamoL tablet 500 mg (500 mg Oral Given 2/20/23 0006)     Medical Decision Making:   History:   Old Medical Records: I decided to obtain old medical records.  Clinical Tests:   Lab Tests: Ordered and Reviewed  ED Management:   MDM  This is an emergent evaluation of a 42 y.o.  with a PMHx of Lupus nephritis, Seizures, HTN , Thyroid cancer (status post-resection), Bipolar 1 Disorder, Schizophrenia who presents to the ED with generalized myalgia onset 3 days. Initial vitals in the ED  [02/19/23 2030]  BP: (!) 180/100  Pulse: 100  Resp: 18  Temp: 98.5 °F (36.9 °C)  SpO2: 99 % .     Physical exam noted above. DDx includes but is not limited to COVID versus influenza versus other viral illness, cellulitis, contact dermatitis, eczema. Also considered but clinically less likely to be Aidan Selby's, TENS. Will obtain labs and imaging including COVID, influenza, CBC, CMP, inflammatory markers, BNP. Will also provide p.o. pain medication. Will continue to monitor and frequently reassess pending results of labs, treatments and final disposition.    Patient is aware of  plan and is amenable.     Michelle Morrison D.O  EMERGENCY MEDICINE  11:46 PM 02/19/2023    UPDATE:    Patient presents for emergent evaluation of acute generalized body aches that poses a threat to life and/or bodily function.    In the ED patient found to have acute diffuse erythematous rash consistent with previous rashes that patient had been evaluated for in the ED last year.    I ordered labs and personally reviewed them. Labs significant for a UA with 3+ ketones, 1+ protein without nitrites or leukocyte esterase, a mildly elevated CRP which is overall downward trending when compared to previous labs.    Patient was managed in the ED with p.o. Robaxin.    The response to treatment was improve pain while in the ED.    Per chart review, patient has had similar symptoms in the past and has not previously followed up with outpatient Dermatology despite recommendations during several different ED visits.  Patient reports that she was told that this appointment was supposed to be scheduled for her.  I counseled patient on importance of following up with dermatology.  Patient was discharged in stable condition with topical steroids, Vistaril for itching, oral prednisone, and a short course of Keflex to cover for cellulitis.  Patient was also given refills of her antihypertensive medication as well as follow-up with dermatology as well as primary care.  Detailed return precautions discussed.      This chart was completed using dictation software, as a result there may be some transcription errors         Scribe Attestation:   Scribe #1: I performed the above scribed service and the documentation accurately describes the services I performed. I attest to the accuracy of the note.                   Clinical Impression:   Final diagnoses:  [R21] Rash (Primary)        ED Disposition Condition    Discharge Stable          ED Prescriptions       Medication Sig Dispense Start Date End Date Auth. Provider     triamcinolone acetonide 0.1% (KENALOG) 0.1 % ointment Apply topically 2 (two) times daily. for 20 days 453.6 g 2/20/2023 3/12/2023 Michelle Morrison DO    amLODIPine (NORVASC) 10 MG tablet Take 1 tablet (10 mg total) by mouth once daily. 30 tablet 2/20/2023 4/21/2023 Michelle Morrison DO    hydrOXYzine pamoate (VISTARIL) 25 MG Cap Take 1 capsule (25 mg total) by mouth every 4 (four) hours as needed (itching). 30 capsule 2/20/2023 -- Michelle Morrison DO    predniSONE (DELTASONE) 20 MG tablet Take 2 tablets (40 mg total) by mouth once daily. for 5 days 10 tablet 2/20/2023 2/25/2023 Michelle Morrison DO    cephALEXin (KEFLEX) 500 MG capsule Take 1 capsule (500 mg total) by mouth 4 (four) times daily. for 5 days 20 capsule 2/20/2023 2/25/2023 Michelle Morrison DO          Follow-up Information       Follow up With Specialties Details Why Contact Info    Sofi Kennedy MD Family Medicine Schedule an appointment as soon as possible for a visit in 3 days Emergency Room Follow-up [address] [facility] [id number] [phone]      Dermatology  Call in 2 days Emergency Room Follow-up, please call to schedule a follow-up appointment with Dermatology as stated in previous hospital visits [facility]        I, Michelle Morrison DO, personally performed the services described in this documentation. All medical record entries made by the scribe were at my direction and in my presence. I have reviewed the chart and agree that the record reflects my personal performance and is accurate and complete.      Michelle Morrison DO  02/24/23 0718

## 2023-03-07 NOTE — PLAN OF CARE
"    Patient seen and examined at bedside. Questions answered. Dermatology consult placed. Orders reviewed.      Per  note:  "42 y/o F with PMH of HTN, bipolar disorder, and seizure disorder presented to [facility] ED today for worsening leg pain and swelling.    Of note, [place] [facility] [place] for diffuse body rash after exposure to [facility].  At that time, Dermatology felt this was atopic dermatitis with concomitant contact dermatitis, and superimposed cellulitis.  She was discharged with steroid cream and PO Keflex and Doxy x 10 days, with which she has been adherent.  She has not been able to f/u with [facility] Dermatology.  No f/c, SOB, throat swelling, bleeding.     Vitals: Temp: 98.2 °F (36.8 °C) (05/23/22 1629)  Pulse: 104 (05/23/22 1629)  Resp: 18 (05/23/22 1629)  BP: (!) 137/91 (05/23/22 1629)  SpO2: 99 % (05/23/22 1629)     New thrombocytopenia with PLT 49  K 3.2     Rash looks more diffuse, erythematous now compared to prior pictures; images in Epic.  No oral or genital mucosal lesions, Nikolsky negative     Referring team requesting transfer given worsening of rash and unable to have close f/u.  Case discussed with Dermatology who is agreeable to consult, but unable to provide recs at this time."    Megan Roberson PA-C  [facility]  " Quality 130: Documentation Of Current Medications In The Medical Record: Current Medications Documented Detail Level: Detailed

## 2024-06-07 ENCOUNTER — HOSPITAL ENCOUNTER (EMERGENCY)
Facility: HOSPITAL | Age: 44
Discharge: HOME | End: 2024-06-07
Attending: EMERGENCY MEDICINE
Payer: MEDICAID

## 2024-06-07 VITALS
DIASTOLIC BLOOD PRESSURE: 91 MMHG | TEMPERATURE: 98 F | OXYGEN SATURATION: 100 % | HEIGHT: 66 IN | HEART RATE: 97 BPM | WEIGHT: 189 LBS | RESPIRATION RATE: 26 BRPM | SYSTOLIC BLOOD PRESSURE: 154 MMHG | BODY MASS INDEX: 30.37 KG/M2

## 2024-06-07 DIAGNOSIS — F14.90 COCAINE USE: ICD-10-CM

## 2024-06-07 DIAGNOSIS — M25.519 SHOULDER PAIN: ICD-10-CM

## 2024-06-07 DIAGNOSIS — I10 HYPERTENSION, UNSPECIFIED TYPE: Primary | ICD-10-CM

## 2024-06-07 DIAGNOSIS — M54.12 CERVICAL RADICULOPATHY: ICD-10-CM

## 2024-06-07 DIAGNOSIS — M54.2 NECK PAIN: ICD-10-CM

## 2024-06-07 DIAGNOSIS — M54.9 BACK PAIN: ICD-10-CM

## 2024-06-07 DIAGNOSIS — F15.90 AMPHETAMINE USE: ICD-10-CM

## 2024-06-07 DIAGNOSIS — R07.9 CHEST PAIN: ICD-10-CM

## 2024-06-07 LAB
ALBUMIN SERPL BCP-MCNC: 3.9 G/DL (ref 3.5–5.2)
ALP SERPL-CCNC: 67 U/L (ref 55–135)
ALT SERPL W/O P-5'-P-CCNC: 43 U/L (ref 10–44)
AMPHET+METHAMPHET UR QL: ABNORMAL
ANION GAP SERPL CALC-SCNC: 8 MMOL/L (ref 8–16)
AST SERPL-CCNC: 50 U/L (ref 10–40)
B-HCG UR QL: NEGATIVE
BARBITURATES UR QL SCN>200 NG/ML: NEGATIVE
BASOPHILS # BLD AUTO: 0.03 K/UL (ref 0–0.2)
BASOPHILS NFR BLD: 0.4 % (ref 0–1.9)
BENZODIAZ UR QL SCN>200 NG/ML: NEGATIVE
BILIRUB SERPL-MCNC: 0.4 MG/DL (ref 0.1–1)
BILIRUB UR QL STRIP: NEGATIVE
BNP SERPL-MCNC: 15 PG/ML (ref 0–99)
BUN SERPL-MCNC: 12 MG/DL (ref 6–20)
BZE UR QL SCN: ABNORMAL
CALCIUM SERPL-MCNC: 9.3 MG/DL (ref 8.7–10.5)
CANNABINOIDS UR QL SCN: NEGATIVE
CHLORIDE SERPL-SCNC: 105 MMOL/L (ref 95–110)
CHOLEST SERPL-MCNC: 151 MG/DL (ref 120–199)
CHOLEST/HDLC SERPL: 1.7 {RATIO} (ref 2–5)
CLARITY UR: CLEAR
CO2 SERPL-SCNC: 24 MMOL/L (ref 23–29)
COLOR UR: YELLOW
CREAT SERPL-MCNC: 1 MG/DL (ref 0.5–1.4)
CREAT UR-MCNC: 54.8 MG/DL (ref 15–325)
CTP QC/QA: YES
DIFFERENTIAL METHOD BLD: ABNORMAL
EOSINOPHIL # BLD AUTO: 0.3 K/UL (ref 0–0.5)
EOSINOPHIL NFR BLD: 4.9 % (ref 0–8)
ERYTHROCYTE [DISTWIDTH] IN BLOOD BY AUTOMATED COUNT: 12.6 % (ref 11.5–14.5)
EST. GFR  (NO RACE VARIABLE): >60 ML/MIN/1.73 M^2
GLUCOSE SERPL-MCNC: 93 MG/DL (ref 70–110)
GLUCOSE UR QL STRIP: NEGATIVE
HCT VFR BLD AUTO: 40.9 % (ref 37–48.5)
HDLC SERPL-MCNC: 88 MG/DL (ref 40–75)
HDLC SERPL: 58.3 % (ref 20–50)
HGB BLD-MCNC: 13.7 G/DL (ref 12–16)
HGB UR QL STRIP: NEGATIVE
IMM GRANULOCYTES # BLD AUTO: 0.01 K/UL (ref 0–0.04)
IMM GRANULOCYTES NFR BLD AUTO: 0.1 % (ref 0–0.5)
INR PPP: 1 (ref 0.8–1.2)
KETONES UR QL STRIP: NEGATIVE
LDLC SERPL CALC-MCNC: 55.4 MG/DL (ref 63–159)
LEUKOCYTE ESTERASE UR QL STRIP: NEGATIVE
LYMPHOCYTES # BLD AUTO: 2 K/UL (ref 1–4.8)
LYMPHOCYTES NFR BLD: 30 % (ref 18–48)
MCH RBC QN AUTO: 32.8 PG (ref 27–31)
MCHC RBC AUTO-ENTMCNC: 33.5 G/DL (ref 32–36)
MCV RBC AUTO: 98 FL (ref 82–98)
METHADONE UR QL SCN>300 NG/ML: NEGATIVE
MONOCYTES # BLD AUTO: 0.6 K/UL (ref 0.3–1)
MONOCYTES NFR BLD: 9.1 % (ref 4–15)
NEUTROPHILS # BLD AUTO: 3.8 K/UL (ref 1.8–7.7)
NEUTROPHILS NFR BLD: 55.5 % (ref 38–73)
NITRITE UR QL STRIP: NEGATIVE
NONHDLC SERPL-MCNC: 63 MG/DL
NRBC BLD-RTO: 0 /100 WBC
OPIATES UR QL SCN: NEGATIVE
PCP UR QL SCN>25 NG/ML: NEGATIVE
PH UR STRIP: 8 [PH] (ref 5–8)
PLATELET # BLD AUTO: 292 K/UL (ref 150–450)
PMV BLD AUTO: 9.6 FL (ref 9.2–12.9)
POC MOLECULAR INFLUENZA A AGN: NEGATIVE
POC MOLECULAR INFLUENZA B AGN: NEGATIVE
POTASSIUM SERPL-SCNC: 3.7 MMOL/L (ref 3.5–5.1)
PROT SERPL-MCNC: 8.5 G/DL (ref 6–8.4)
PROT UR QL STRIP: NEGATIVE
PROTHROMBIN TIME: 11.1 SEC (ref 9–12.5)
RBC # BLD AUTO: 4.18 M/UL (ref 4–5.4)
SARS-COV-2 RDRP RESP QL NAA+PROBE: NEGATIVE
SODIUM SERPL-SCNC: 137 MMOL/L (ref 136–145)
SP GR UR STRIP: 1.01 (ref 1–1.03)
T4 FREE SERPL-MCNC: 1.12 NG/DL (ref 0.71–1.51)
TOXICOLOGY INFORMATION: ABNORMAL
TRIGL SERPL-MCNC: 38 MG/DL (ref 30–150)
TROPONIN I SERPL DL<=0.01 NG/ML-MCNC: 0.01 NG/ML (ref 0–0.03)
TROPONIN I SERPL DL<=0.01 NG/ML-MCNC: <0.006 NG/ML (ref 0–0.03)
TSH SERPL DL<=0.005 MIU/L-ACNC: 0.3 UIU/ML (ref 0.4–4)
URN SPEC COLLECT METH UR: NORMAL
UROBILINOGEN UR STRIP-ACNC: NEGATIVE EU/DL
WBC # BLD AUTO: 6.8 K/UL (ref 3.9–12.7)

## 2024-06-07 PROCEDURE — 99285 EMERGENCY DEPT VISIT HI MDM: CPT | Mod: 25

## 2024-06-07 PROCEDURE — 25500020 PHARM REV CODE 255: Performed by: EMERGENCY MEDICINE

## 2024-06-07 PROCEDURE — 93010 ELECTROCARDIOGRAM REPORT: CPT | Mod: ,,, | Performed by: INTERNAL MEDICINE

## 2024-06-07 PROCEDURE — 84439 ASSAY OF FREE THYROXINE: CPT | Performed by: NURSE PRACTITIONER

## 2024-06-07 PROCEDURE — 80053 COMPREHEN METABOLIC PANEL: CPT | Performed by: NURSE PRACTITIONER

## 2024-06-07 PROCEDURE — 81025 URINE PREGNANCY TEST: CPT | Performed by: NURSE PRACTITIONER

## 2024-06-07 PROCEDURE — 81003 URINALYSIS AUTO W/O SCOPE: CPT | Mod: 59 | Performed by: EMERGENCY MEDICINE

## 2024-06-07 PROCEDURE — 84484 ASSAY OF TROPONIN QUANT: CPT | Performed by: EMERGENCY MEDICINE

## 2024-06-07 PROCEDURE — 87502 INFLUENZA DNA AMP PROBE: CPT

## 2024-06-07 PROCEDURE — 84443 ASSAY THYROID STIM HORMONE: CPT | Performed by: NURSE PRACTITIONER

## 2024-06-07 PROCEDURE — 80307 DRUG TEST PRSMV CHEM ANLYZR: CPT | Performed by: EMERGENCY MEDICINE

## 2024-06-07 PROCEDURE — 87635 SARS-COV-2 COVID-19 AMP PRB: CPT | Performed by: EMERGENCY MEDICINE

## 2024-06-07 PROCEDURE — 63600175 PHARM REV CODE 636 W HCPCS: Performed by: EMERGENCY MEDICINE

## 2024-06-07 PROCEDURE — 93005 ELECTROCARDIOGRAM TRACING: CPT

## 2024-06-07 PROCEDURE — 83880 ASSAY OF NATRIURETIC PEPTIDE: CPT | Performed by: EMERGENCY MEDICINE

## 2024-06-07 PROCEDURE — 96375 TX/PRO/DX INJ NEW DRUG ADDON: CPT | Mod: 59

## 2024-06-07 PROCEDURE — 85025 COMPLETE CBC W/AUTO DIFF WBC: CPT | Performed by: NURSE PRACTITIONER

## 2024-06-07 PROCEDURE — 85610 PROTHROMBIN TIME: CPT | Performed by: NURSE PRACTITIONER

## 2024-06-07 PROCEDURE — 96374 THER/PROPH/DIAG INJ IV PUSH: CPT | Mod: 59

## 2024-06-07 PROCEDURE — 80061 LIPID PANEL: CPT | Performed by: NURSE PRACTITIONER

## 2024-06-07 RX ORDER — HYDROMORPHONE HYDROCHLORIDE 1 MG/ML
0.5 INJECTION, SOLUTION INTRAMUSCULAR; INTRAVENOUS; SUBCUTANEOUS
Status: COMPLETED | OUTPATIENT
Start: 2024-06-07 | End: 2024-06-07

## 2024-06-07 RX ORDER — METHOCARBAMOL 1000 MG/1
1000 TABLET, COATED ORAL 3 TIMES DAILY
Qty: 30 TABLET | Refills: 0 | Status: SHIPPED | OUTPATIENT
Start: 2024-06-07

## 2024-06-07 RX ORDER — HYDRALAZINE HYDROCHLORIDE 20 MG/ML
10 INJECTION INTRAMUSCULAR; INTRAVENOUS
Status: COMPLETED | OUTPATIENT
Start: 2024-06-07 | End: 2024-06-07

## 2024-06-07 RX ORDER — ALBUTEROL SULFATE 2.5 MG/.5ML
2.5 SOLUTION RESPIRATORY (INHALATION) EVERY 4 HOURS PRN
Qty: 25 EACH | Refills: 0 | Status: SHIPPED | OUTPATIENT
Start: 2024-06-07 | End: 2025-06-07

## 2024-06-07 RX ORDER — LIDOCAINE 50 MG/G
1 PATCH TOPICAL DAILY PRN
Qty: 10 PATCH | Refills: 0 | Status: SHIPPED | OUTPATIENT
Start: 2024-06-07 | End: 2024-06-17

## 2024-06-07 RX ORDER — ALBUTEROL SULFATE 90 UG/1
1-2 AEROSOL, METERED RESPIRATORY (INHALATION) EVERY 6 HOURS PRN
Qty: 8 G | Refills: 0 | Status: SHIPPED | OUTPATIENT
Start: 2024-06-07 | End: 2025-06-07

## 2024-06-07 RX ORDER — KETOROLAC TROMETHAMINE 30 MG/ML
10 INJECTION, SOLUTION INTRAMUSCULAR; INTRAVENOUS
Status: COMPLETED | OUTPATIENT
Start: 2024-06-07 | End: 2024-06-07

## 2024-06-07 RX ORDER — AMLODIPINE BESYLATE 10 MG/1
10 TABLET ORAL DAILY
Qty: 30 TABLET | Refills: 0 | Status: SHIPPED | OUTPATIENT
Start: 2024-06-07 | End: 2024-08-06

## 2024-06-07 RX ORDER — NAPROXEN 500 MG/1
500 TABLET ORAL 2 TIMES DAILY WITH MEALS
Qty: 20 TABLET | Refills: 0 | Status: SHIPPED | OUTPATIENT
Start: 2024-06-07

## 2024-06-07 RX ORDER — HYDROCHLOROTHIAZIDE 25 MG/1
25 TABLET ORAL DAILY
Qty: 30 TABLET | Refills: 0 | Status: SHIPPED | OUTPATIENT
Start: 2024-06-07 | End: 2025-06-07

## 2024-06-07 RX ADMIN — HYDRALAZINE HYDROCHLORIDE 10 MG: 20 INJECTION INTRAMUSCULAR; INTRAVENOUS at 01:06

## 2024-06-07 RX ADMIN — IOHEXOL 100 ML: 350 INJECTION, SOLUTION INTRAVENOUS at 02:06

## 2024-06-07 RX ADMIN — HYDROMORPHONE HYDROCHLORIDE 0.5 MG: 1 INJECTION, SOLUTION INTRAMUSCULAR; INTRAVENOUS; SUBCUTANEOUS at 01:06

## 2024-06-07 RX ADMIN — KETOROLAC TROMETHAMINE 10 MG: 30 INJECTION, SOLUTION INTRAMUSCULAR at 04:06

## 2024-06-07 NOTE — CONSULTS
[facility] [place]  Neurology Consult Note    Patient Name: Nicole Collins  Age: 44 y.o.  MRN: [id number]  Admission Date: 6/7/2024  Reason for consult:     CC:      HPI:   Nicole Collins is a 44 y.o. year old {handedness:033229} {Desc; male/female:43993} with PMHx of {neuro assos sx:87601}  who was admitted to {admit service:26347} on *** due to    ROS: {NEURO ROS:93545}      Histories:  Allergies:  Tangerine, Sweet orange(citrus sinensis), and Oranges [orange]    Current Medications:    No current facility-administered medications for this encounter.     Current Outpatient Medications   Medication Sig Dispense Refill    acetaminophen (TYLENOL) 325 MG tablet Take 2 tablets (650 mg total) by mouth every 6 (six) hours as needed for Pain (or fever). (Patient not taking: Reported on 6/2/2022) 60 tablet 0    albuterol sulfate 2.5 mg/0.5 mL Nebu Take 2.5 mg by nebulization every 4 (four) hours as needed (cough and wheezing). Rescue 25 each 0    ALPRAZolam (XANAX) 1 MG tablet Take 1 mg by mouth.      amLODIPine (NORVASC) 10 MG tablet Take 1 tablet (10 mg total) by mouth once daily. 30 tablet 1    cetirizine (ZYRTEC) 10 MG tablet Take 1 tablet (10 mg total) by mouth once daily. 30 tablet 0    dextroamphetamine-amphetamine 30 mg Tab Take 1 tablet by mouth 2 (two) times daily.      EScitalopram oxalate (LEXAPRO) 20 MG tablet Take 20 mg by mouth every morning.      famotidine (PEPCID) 20 MG tablet Take 1 tablet (20 mg total) by mouth 2 (two) times daily. (Patient not taking: Reported on 6/2/2022) 20 tablet 0    hydroCHLOROthiazide (HYDRODIURIL) 25 MG tablet Take 1 tablet (25 mg total) by mouth once daily. 30 tablet 0    hydrocortisone 2.5 % cream Apply topically 2 (two) times daily as needed (itching). 30 g 0    hydrOXYzine HCL (ATARAX) 25 MG tablet Take 1 tablet (25 mg total) by mouth every 6 (six) hours as needed for Itching (May cause drowsiness. Do not drive when taking.). 25 tablet 0    hydrOXYzine  pamoate (VISTARIL) 25 MG Cap Take 1 capsule (25 mg total) by mouth every 4 (four) hours as needed (itching). 30 capsule 1    ibuprofen (ADVIL,MOTRIN) 600 MG tablet Take 1 tablet (600 mg total) by mouth every 6 (six) hours as needed for Pain. 20 tablet 0    menthol 3.2 mg Lozg by Mucous Membrane route.      methocarbamol (ROBAXIN) 500 MG Tab Take 500 mg by mouth 4 (four) times daily.      naloxone (NARCAN) 4 mg/actuation Spry 4mg by nasal route as needed for opioid overdose; may repeat every 2-3 minutes in alternating nostrils until medical help arrives. Call 911 (Patient not taking: Reported on 2022) 1 each 11    ondansetron (ZOFRAN-ODT) 4 MG TbDL Take 1 tablet (4 mg total) by mouth every 8 (eight) hours as needed. (Patient not taking: Reported on 2022) 12 tablet 0    promethazine (PHENERGAN) 25 MG tablet Take 1 tablet (25 mg total) by mouth every 6 (six) hours as needed for Nausea. 6 tablet 0    quetiapine (SEROQUEL) 50 MG tablet Take 400 mg by mouth every evening.      triamcinolone acetonide 0.1% (KENALOG) 0.1 % ointment Apply topically 2 (two) times daily. for 20 days 453.6 g 1       Medical:   Past Medical History:   Diagnosis Date    Asthma     Bipolar 1 disorder     Chronic hepatitis C without hepatic coma 2022    Hep C quantitative PCR positive    Cocaine abuse     DM mellitus, gestational     ETOH abuse     Hypertension     Methamphetamine abuse     Opiate overdose     Seizures     Systemic lupus erythematosus, organ or system involvement unspecified     Trichomonas infection         Surgeries:   Past Surgical History:   Procedure Laterality Date     SECTION, LOW TRANSVERSE      x 3    ECTOPIC PREGNANCY SURGERY      KNEE SURGERY  2010    left knee    SALPINGECTOMY      THYROID SURGERY      TUBAL LIGATION          Family:   Family History   Problem Relation Name Age of Onset    Heart disease Mother      Cancer Mother          Breast Cancer    Cancer Father    "       Throat Cancer    Cancer Maternal Grandmother      Cancer Maternal Grandfather     , {fam hx disease:83578}    Tobacco Use    Smoking status: Some Days     Types: Cigars, Cigarettes    Smokeless tobacco: Never    Tobacco comments:     Patient states she smokes 1 cigar per week.   Substance and Sexual Activity    Alcohol use: Yes     Comment: binge drinker    Drug use: Yes     Types: Marijuana, "Crack" cocaine, MDMA (Ecstacy), Amphetamines, Cocaine, Methamphetamines, Heroin    Sexual activity: Not Currently         Physical Exam:     Physical Examination  BP (!) 167/92   Pulse 103   Temp 97.7 °F (36.5 °C) (Oral)   Resp 19   Ht 5' 6" (1.676 m)   Wt 85.7 kg (189 lb)   SpO2 100% Comment: right  Breastfeeding No   BMI 30.51 kg/m²   Body mass index is 30.51 kg/m².    Neurological Exam  Mental Status:   Alert and oriented to name, date, location, president.   Naming/Fluency/Comprehension/Repetition intact.   Recent/remote memory, registration, attention span/concentration, fund of knowledge intact by history.    CN:   II, III, IV, VI: PERRL, EOMI, no nystagmus, fundus not visualized due to inadequate dilation.V: intact to fine touch, temperature, pain.VII: symmetrical facial movement, nice smile, no drooping.VIII: grossly intact to hearing.IX, X: symmetrical palate, normal palatal elevation.XI: 5/5 SCM and 5/5 trapezius.XII: tongue midline, 5/5, no deviation or fasciculation.             Motor:    ShAb ElbEx ElbFle WrE WrFle Interos  HipFl KnExt KnFlex FtDors FtPlant   Left 5 5 5 5 5 5 5 5 5 5 5 5   Right 5 5 5 5 5 5 5 5 5 5 5 5   Tone: Normal tone in UE and LE, no atrophy, no fasciculation, no tremor no pronator drift.                Reflexes:    Bicep Tricep Brachioradial Patellar Achilles   Left 2+ 2+ 2+ 2+ 1+   Right 2+ 2+ 2+ 2+ 1+   No Clonus, down going toes b/l.    Sensation: on both UEs and LEs    Light Touch: Normal.Pinprick: Normal.Proprioception: Normal.Vibration: Normal.Temperature: " Normal.    Coordination:    Tremor: Absent.Dysmetria: Absent.Heel to Shin: Normal.Nose to Finger: Normal.    Gait:      ***    Significant Labs: {Results:27884}    Significant Imaging: {Imaging Review:36519}  Images were personally reviewed and interpreted:     Assessment and Plan:     There are no hospital problems to display for this patient.    VTE Risk Mitigation (From admission, onward)      None              Thank you for your consult. {SIGN OFF/FOLLOW-UP:02671}    Time spent on this encounter: *** minutes. This includes face to face time and non-face to face time preparing to see the patient (eg, review of tests), obtaining and/or reviewing separately obtained history, documenting clinical information in the electronic or other health record, independently interpreting results and communicating results to the patient/family/caregiver, or care coordinator.     Ryan Lovelace MD  Neurology  [facility]

## 2024-06-07 NOTE — ED TRIAGE NOTES
Patient come sin with Left arm heaviness, states hurts to move but able to move, +  strength, states pain all over, posterior neck into bilateral arms, CP that come sand goes and nose bleeds, states ahhs been out of medications for over a month went to PCP to obtain RX was not given RX

## 2024-06-07 NOTE — ED PROVIDER NOTES
Encounter Date: 6/7/2024    SCRIBE #1 NOTE: I, Ronit Figueroa, am scribing for, and in the presence of,  Robyn Patton MD.       History     Chief Complaint   Patient presents with    Numbness    Epistaxis    Back Pain    Dizziness    Vomiting     Pt presents to ER with complaints of neck pain and numbness, nose bleeds, diarrhea and vomiting. Pt also reports not being able to feel her neck. Pt reports left ant chest wall pain. Pt states she received bad news on yesterday morning and all her symptoms began. Pt says she is stressed out. Pt states she took ibuprofen and Amber ASA.      Nicole Collins is a 44 y.o. female, with a PMHx of seizures, HTN, bipolar disorder, lupus, hepatitis C, polysubstance abuse, who presents to the ED with multiple complaints. Patient reports yesterday around 0900 she got some bad news, and about 1 hour afterwards she started feeling anxious and stressed then multiple symptoms came on and have been persisting until today. Reports pain from left neck radiating to her left shoulder stating it feels heavy/painful to move, nausea and vomiting (x4, nonbloody), diarrhea today, intermittent epistaxis bilaterally, mild intermittent headache, intermittent left-sided CP worse with palpation and moving BUE that lasts for seconds, SOB. No other exacerbating or alleviating factors. Denies fever, dysuria, hematuria, frequency, urine decreased, abdominal pain, or other associated symptoms. Denies recent falls, injuries or trauma. Reports she has been out of her antihypertensives for 1 month, she is unsure of what medications she was taking. Reports she has been taking her BP at home throughout the month and it has been very high. Admits to EtOH use (last use on her birthday 6/5/2025). Reports she also took an ecstasy pill on her birthday but denies further cocaine/other drug use in the last year. Admits to smoking marijuana and cigarettes (1/3ppd). NKDA (allergic to tangerine, oranges listed in chart).  PSHx of knee surgery,  section, salpingectomy, tibal ligation, thyroid surgery      Per chart review, pt is prescribed lexapro, pepcd, hydrochlorothiazide 25 mg, Mydayis, amlodipine.   Last seen in this ED on 2023 with generalized body aches. Patient had similar symptoms in the past and has not previously followed up with outpatient Dermatology despite recommendations during several different ED visits. Labs significant for a UA with 3+ ketones, 1+ protein without nitrites or leukocyte esterase, a mildly elevated CRP which is overall downward trending when compared to previous labs. Pain managed in ED with robaxin. Given refills of antihypertensives. Referred to dermatology.     The history is provided by the patient and medical records. No  was used.     Review of patient's allergies indicates:   Allergen Reactions    Tangerine Anaphylaxis    Sweet orange(citrus sinensis) Hives    Oranges [orange]      Past Medical History:   Diagnosis Date    Asthma     Bipolar 1 disorder     Chronic hepatitis C without hepatic coma 2022    Hep C quantitative PCR positive    Cocaine abuse     DM mellitus, gestational     ETOH abuse     Hypertension     Methamphetamine abuse     Opiate overdose     Seizures     Systemic lupus erythematosus, organ or system involvement unspecified     Trichomonas infection      Past Surgical History:   Procedure Laterality Date     SECTION, LOW TRANSVERSE      x 3    ECTOPIC PREGNANCY SURGERY      KNEE SURGERY  2010    left knee    SALPINGECTOMY      THYROID SURGERY      TUBAL LIGATION       Family History   Problem Relation Name Age of Onset    Heart disease Mother      Cancer Mother          Breast Cancer    Cancer Father          Throat Cancer    Cancer Maternal Grandmother      Cancer Maternal Grandfather       Social History     Tobacco Use    Smoking status: Some Days     Types: Cigars, Cigarettes    Smokeless tobacco: Never    Tobacco comments:  "    Patient states she smokes 1 cigar per week.   Substance Use Topics    Alcohol use: Yes     Comment: binge drinker    Drug use: Yes     Types: Marijuana, "Crack" cocaine, MDMA (Ecstacy), Amphetamines, Cocaine, Methamphetamines, Heroin     Review of Systems   Constitutional:  Negative for chills, diaphoresis, fatigue and fever.   HENT:  Positive for nosebleeds. Negative for rhinorrhea and sore throat.    Eyes:  Negative for photophobia, redness and visual disturbance.   Respiratory:  Positive for shortness of breath. Negative for cough.    Cardiovascular:  Positive for chest pain. Negative for leg swelling.   Gastrointestinal:  Positive for diarrhea, nausea and vomiting. Negative for abdominal pain, blood in stool and constipation.   Genitourinary:  Negative for decreased urine volume, difficulty urinating, dysuria, flank pain, frequency, hematuria and urgency.   Musculoskeletal:  Positive for arthralgias and neck pain. Negative for back pain and neck stiffness.   Skin:  Negative for rash and wound.   Neurological:  Positive for headaches. Negative for syncope, weakness, light-headedness and numbness.   Psychiatric/Behavioral:  Negative for confusion and suicidal ideas. The patient is nervous/anxious.    All other systems reviewed and are negative.      Physical Exam     Initial Vitals [06/07/24 1238]   BP Pulse Resp Temp SpO2   (!) 244/121 83 20 97.7 °F (36.5 °C) 98 %      MAP       --         Physical Exam    Nursing note and vitals reviewed.  Constitutional: She appears well-developed and well-nourished. She is not diaphoretic. No distress.   HENT:   Head: Normocephalic and atraumatic.   Mouth/Throat: Oropharynx is clear and moist. No oropharyngeal exudate.   No epistaxis    Eyes: Conjunctivae and EOM are normal. Pupils are equal, round, and reactive to light. Right eye exhibits no discharge. Left eye exhibits no discharge.   Neck: Neck supple. No JVD present.   Normal range of motion.  Cardiovascular:  Normal " rate, regular rhythm, normal heart sounds and intact distal pulses.     Exam reveals no gallop and no friction rub.       No murmur heard.  Pulmonary/Chest: Breath sounds normal. No respiratory distress. She has no wheezes. She has no rhonchi. She has no rales. She exhibits tenderness (left, no overlying rash).   Abdominal: Abdomen is soft. Bowel sounds are normal. She exhibits no distension. There is no abdominal tenderness. There is no rebound and no guarding.   Musculoskeletal:         General: Tenderness present. No edema.      Cervical back: Normal range of motion and neck supple.      Comments: Tenderness of neck down to upper thoracic area, C5-T3. Tenderness over left trapezius, some pain with ROM of left shoulder, able to fully range. Able to fully range left wrist and elbow without pain or difficulty.      Lymphadenopathy:     She has no cervical adenopathy.   Neurological: She is alert and oriented to person, place, and time. She has normal strength. No cranial nerve deficit or sensory deficit. GCS score is 15. GCS eye subscore is 4. GCS verbal subscore is 5. GCS motor subscore is 6.   Moves all extremities and carries on conversation. CN- II: PERRL; III/IV/VI: EOMI w/out evidence of nystagmus; V: no deficits appreciated to light touch bilateral face; VII: no facial weakness, no facial asymmetry. Eyebrow raise symmetric. Smile symmetric; IX/X: palate midline, and raises symmetrically; XI: shoulder shrug 5/5 bilaterally; XII: tongue is midline w/out asymmetry. Strength 5/5 to bilateral upper and lower extremities, sensation intact to light touch, no pronator drift.    Skin: Skin is warm and dry. Capillary refill takes less than 2 seconds. No rash noted.   Psychiatric: She has a normal mood and affect. Thought content normal.         ED Course   Procedures  Labs Reviewed   CBC W/ AUTO DIFFERENTIAL - Abnormal; Notable for the following components:       Result Value    MCH 32.8 (*)     All other components  within normal limits   COMPREHENSIVE METABOLIC PANEL - Abnormal; Notable for the following components:    Total Protein 8.5 (*)     AST 50 (*)     All other components within normal limits   TSH - Abnormal; Notable for the following components:    TSH 0.304 (*)     All other components within normal limits   LIPID PANEL - Abnormal; Notable for the following components:    HDL 88 (*)     LDL Cholesterol 55.4 (*)     HDL/Cholesterol Ratio 58.3 (*)     Total Cholesterol/HDL Ratio 1.7 (*)     All other components within normal limits   DRUG SCREEN PANEL, URINE EMERGENCY - Abnormal; Notable for the following components:    Cocaine (Metab.) Presumptive Positive (*)     Amphetamine Screen, Ur Presumptive Positive (*)     All other components within normal limits    Narrative:     Specimen Source->Urine   PROTIME-INR   B-TYPE NATRIURETIC PEPTIDE   TROPONIN I   URINALYSIS, REFLEX TO URINE CULTURE    Narrative:     Specimen Source->Urine   T4, FREE   TROPONIN I   POCT URINE PREGNANCY   SARS-COV-2 RDRP GENE   POCT INFLUENZA A/B MOLECULAR          Imaging Results              CTA Chest Abdomen Pelvis (Final result)  Result time 06/07/24 15:36:55      Final result by Juan Antonio Steele MD (06/07/24 15:36:55)                   Impression:      No aortic dissection or other acute aortic syndrome, noting motion artifact in the ascending aorta.    Other findings as described.      Electronically signed by: Juan Antonio Steele  Date:    06/07/2024  Time:    15:36               Narrative:    EXAMINATION:  CTA CHEST ABDOMEN PELVIS    CLINICAL HISTORY:  Aortic dissection suspected;    TECHNIQUE:  CT angiogram of the chest, abdomen, and pelvis before and after intravenous administration of 100 mL Omnipaque 350.  No oral contrast.    COMPARISON:  CT chest 12/08/2010; CT renal stone 09/14/2010    FINDINGS:  VASCULAR:    No significant atherosclerosis..    Aorta: Left-sided 3-vessel aortic arch. The ascending aorta is distorted by motion artifact.   The aorta and arch vessels are patent. No aneurysm, dissection, intramural hematoma, or penetrating atherosclerotic ulcer.    Mesenteric arteries: The celiac artery, SMA, and IRENA are patent.    Renal arteries: There are single renal arteries bilaterally, which are patent.    Iliac arteries: The iliac and common femoral arteries are patent.    LINES/TUBES:  None.    SOFT TISSUES: No axillary or subpectoral lymphadenopathy. Postoperative changes from left thyroidectomy.  The residual thyroid is unremarkable.    HEART AND MEDIASTINUM: No mediastinal or hilar lymphadenopathy. Heart and pericardium are within normal limits. The main pulmonary artery is normal in size.    PLEURA: No pleural effusion or pneumothorax.    LUNGS AND AIRWAYS: Airways are patent. Lung bases are distorted by motion artifact.  No consolidation.    HEPATOBILIARY: No focal hepatic lesions. No biliary ductal dilatation. Normal gallbladder.    SPLEEN: No splenomegaly.    PANCREAS: No focal masses or ductal dilatation.    ADRENALS: No adrenal nodules.    KIDNEYS/URETERS: No hydronephrosis, stones, or solid mass lesions.    PELVIC ORGANS/BLADDER: Bladder is unremarkable.  There is a 1.3 cm hypoenhancing lesion in the posterior uterine body, likely an intramural fibroid.  There is a 3.5 cm simple right adnexal cyst.    PERITONEUM / RETROPERITONEUM: No free air or fluid.    LYMPH NODES: No abdominopelvic lymphadenopathy.    GI TRACT: Colonic diverticulosis.  No evidence of bowel obstruction or inflammation.  Normal appendix.    BONES: Degenerative changes at L5-S1.  No fractures or focal osseous lesions.                                       X-Ray Shoulder Trauma Left (Final result)  Result time 06/07/24 14:53:49      Final result by José Mckeon DO (06/07/24 14:53:49)                   Impression:      Please see above      Electronically signed by: José Mckeon DO  Date:    06/07/2024  Time:    14:53               Narrative:    EXAMINATION:  XR  SHOULDER TRAUMA 3 VIEW LEFT    CLINICAL HISTORY:  Pain in unspecified shoulder    TECHNIQUE:  Three views of the left shoulder were performed.    COMPARISON  None    FINDINGS:  No evidence for acute fracture line or dislocation left shoulder.  No consolidation visualized left lung.  Further evaluation as warranted clinically.                                       X-Ray Thoracic Spine AP Lateral (Final result)  Result time 06/07/24 14:53:12      Final result by José Mckeon DO (06/07/24 14:53:12)                   Impression:      Please see above      Electronically signed by: José Mckeon DO  Date:    06/07/2024  Time:    14:53               Narrative:    EXAMINATION:  XR THORACIC SPINE AP LATERAL    CLINICAL HISTORY:  Dorsalgia, unspecified    TECHNIQUE:  AP, swimmer's and lateral views of the thoracic spine were performed.    COMPARISON:  11/29/2021    FINDINGS:  Please note that the 1st and 2nd thoracic vertebra partially obscured by overlapping structures despite swimmer view the thoracic sagittal alignment is within normal limits.  Thoracic vertebral body heights, and contours are within as without evidence for acute fracture or subluxation.  No consolidation visualized lungs    Further evaluation as warranted clinically..                                       CT Head Without Contrast (Final result)  Result time 06/07/24 13:12:40      Final result by José Mckeon DO (06/07/24 13:12:40)                   Impression:      CT head: No acute intracranial findings as detailed above specifically without evidence for acute intracranial hemorrhage or sulcal effacement to suggest large territory recent infarction.    CT cervical spine: Degenerative change cervical spine similar to prior without evidence for acute fracture or traumatic subluxation    Further evaluation as warranted clinically.      Electronically signed by: José Mckeon DO  Date:    06/07/2024  Time:    13:12               Narrative:     EXAMINATION:  CT HEAD WITHOUT CONTRAST; CT CERVICAL SPINE WITHOUT CONTRAST    CLINICAL HISTORY:  Neuro deficit, acute, stroke suspected;; Cervical radiculopathy, no red flags;    TECHNIQUE:  CT head: Multiple sequential 5 mm axial images of the head without contrast.  Coronal and sagittal reformatted imaging from the axial acquisition.    CT cervical spine: Multiple sequential 1.25 mm axial images of the cervical spine without contrast.  Coronal and sagittal reformatted imaging from the axial acquisition.    COMPARISON:  CT head and cervical spine 04/27/2021    FINDINGS:  CT head: Study is distorted by artifact from motion and cosmetic metal piercings.  Within the limits of the study there is no evidence for acute intracranial hemorrhage or sulcal effacement.  The ventricles are normal in size without hydrocephalus.  There is no midline shift or mass effect.  Complete opacification right frontal sinus.  Probable cosmetic piercing within the left periauricular and left brow soft tissues    CT cervical spine: Continued slightly the reversal of the normal cervical lordosis centered at the C4/C5 level.  Cervical vertebral body heights and contours are stable without evidence for acute fracture or subluxation    No consolidation visualized lung apices    Evaluation of the central canal neural foramen distorted by CT technique allowing for this degenerative change most pronounced at C5/C6 level with small disc bulge similar to prior without significant central canal or bony neural foraminal stenosis                                       CT Cervical Spine Without Contrast (Final result)  Result time 06/07/24 13:12:40      Final result by José Mckeon DO (06/07/24 13:12:40)                   Impression:      CT head: No acute intracranial findings as detailed above specifically without evidence for acute intracranial hemorrhage or sulcal effacement to suggest large territory recent infarction.    CT cervical spine:  Degenerative change cervical spine similar to prior without evidence for acute fracture or traumatic subluxation    Further evaluation as warranted clinically.      Electronically signed by: José Mckeon DO  Date:    06/07/2024  Time:    13:12               Narrative:    EXAMINATION:  CT HEAD WITHOUT CONTRAST; CT CERVICAL SPINE WITHOUT CONTRAST    CLINICAL HISTORY:  Neuro deficit, acute, stroke suspected;; Cervical radiculopathy, no red flags;    TECHNIQUE:  CT head: Multiple sequential 5 mm axial images of the head without contrast.  Coronal and sagittal reformatted imaging from the axial acquisition.    CT cervical spine: Multiple sequential 1.25 mm axial images of the cervical spine without contrast.  Coronal and sagittal reformatted imaging from the axial acquisition.    COMPARISON:  CT head and cervical spine 04/27/2021    FINDINGS:  CT head: Study is distorted by artifact from motion and cosmetic metal piercings.  Within the limits of the study there is no evidence for acute intracranial hemorrhage or sulcal effacement.  The ventricles are normal in size without hydrocephalus.  There is no midline shift or mass effect.  Complete opacification right frontal sinus.  Probable cosmetic piercing within the left periauricular and left brow soft tissues    CT cervical spine: Continued slightly the reversal of the normal cervical lordosis centered at the C4/C5 level.  Cervical vertebral body heights and contours are stable without evidence for acute fracture or subluxation    No consolidation visualized lung apices    Evaluation of the central canal neural foramen distorted by CT technique allowing for this degenerative change most pronounced at C5/C6 level with small disc bulge similar to prior without significant central canal or bony neural foraminal stenosis                                       X-Ray Chest AP Portable (Final result)  Result time 06/07/24 13:00:19      Final result by José Mckeon DO  (06/07/24 13:00:19)                   Impression:      Please see above      Electronically signed by: José Mckeon DO  Date:    06/07/2024  Time:    13:00               Narrative:    EXAMINATION:  XR CHEST AP PORTABLE    CLINICAL HISTORY:  Chest Pain;    TECHNIQUE:  Single frontal view of the chest was performed.    COMPARISON:  04/27/2021    FINDINGS:  Small lung volumes similar to prior.  No new lung opacity.  No lung consolidation.  No large pleural effusion pneumothorax.  Cardio mediastinal silhouette limited by technique but similar to prior.  Further evaluation as warranted.                                       X-Ray Cervical Spine AP And Lateral (Final result)  Result time 06/07/24 13:06:23      Final result by José Mckeon DO (06/07/24 13:06:23)                   Impression:      Please see above      Electronically signed by: José Mckeon DO  Date:    06/07/2024  Time:    13:06               Narrative:    EXAMINATION:  XR CERVICAL SPINE AP LATERAL    CLINICAL HISTORY:  Cervicalgia    TECHNIQUE:  AP, lateral and open mouth views of the cervical spine were performed.    COMPARISON:  None.    FINDINGS:  Straightening of the expected normal cervical lordosis.  The cervical vertebral body heights and contours are within normal is without evidence for acute fracture or subluxation.  Prevertebral soft tissues within normal limits.  Surgical clips left neck soft tissues.  No consolidation visualized lungs.  No widening C1 lateral masses on open mouth view allowing for limitation by overlapping structures.  Further evaluation as warranted.                                       Medications   HYDROmorphone injection 0.5 mg (0.5 mg Intravenous Given 6/7/24 1341)   hydrALAZINE injection 10 mg (10 mg Intravenous Given 6/7/24 1352)   iohexoL (OMNIPAQUE 350) injection 100 mL (100 mLs Intravenous Given 6/7/24 1452)   ketorolac injection 9.999 mg (9.999 mg Intravenous Given 6/7/24 1656)     Medical Decision  "Making  Amount and/or Complexity of Data Reviewed  External Data Reviewed: ECG and notes.     Details: See HPI  External documents reviewed for previous EKG comparison: see ED course.   Labs: ordered. Decision-making details documented in ED Course.  Radiology: ordered. Decision-making details documented in ED Course.  ECG/medicine tests: ordered and independent interpretation performed. Decision-making details documented in ED Course.    Risk  Prescription drug management.    MDM  44-year-old female with past medical history of seizures, hypertension, bipolar, diabetes, ?lupus, hep C, polysubstance use disorder presents with multiple complaints.  Patient states around 9:00 a.m. yesterday morning she received some bad news and around an hour after that she was very anxious and stressed and developed all of the symptoms.  She reports chest pain, shortness breath, cervical neck pain in the left side radiating to her shoulder.  Heaviness in her arms secondary to this pain.  Nosebleeding.  Nausea vomiting and diarrhea.  She denies sick contacts.  She has been out of her medications for blood pressure for over a month and states that her blood pressure is "moy high." She denies any recent cocaine use.  She does report taking Ecstasy on the 5th and drinking alcohol at that time.  On exam patient with some mild pain with range of motion of the left shoulder and pain to the mostly left paraspinal cervical region.  Also mild midline lower cervical upper thoracic pain.  She denies any trauma.  She was pain to the left chest wall.  He has no epistasis currently.  Her neuro exam is normal and she was able to hold her hand up against gravity as well as no pronator drift but does report this is uncomfortable.  No abdominal tenderness.  Differential diagnosis includes was not limited to cervical radiculopathy, hypertensive emergency, LEON, intracranial hemorrhage, ACS, aortic dissection, COVID, flu, substance use, cocaine induced " chest pain.  We will give pain medications and see if this brings down her blood pressure while we are waiting for the CT head to results.  We will obtain CT cervical spine, x-ray of her chest, shoulder, thoracic back.  We will obtain blood pressures bilaterally.  Patient's pulses are equal in bilateral upper extremities.  Given that she was over 24 hours out from her symptoms I think we can slowly bring down her blood pressure as I believe hypertensive emergency is more likely a diagnosis than CVA at this time.  We will avoid labetalol given patient's history of cocaine use, she does decline that she was use this in a year.  We will obtain urine tox screen.    Labs with negative trop x 2, negative covid and flu, UA no protein, no UTI. UPT negative.   Utox + cocaine and amphetamines, may be cause of patient's symptoms.   BNP WNL  No leukocytosis or anemia  AST mildly elevated, patient did report heavy drinking on her birthday. No metabolic derangement  TSH low, normal T4    CTA with no aortic dissection, +fibroid and ovarian cyst.   Shoulder Xr no injury  XR thoracic no NAF  CT head NAF  CT cervical spine DJD (c5/c6 similar to prior, no central canal stenosis), no fracture, dislocation     Patient felt improved after pain medications and BP significantly improved with 1 dose of hydralazine. Tolerating PO.     Discussed case with Dr Lovelace with neurology who evaluated patient in ED and suspects cervical radiculopathy, does not suspect CVA, TIA, HTN emergency. No further neuro workup needed.     Discussed results with patient and will write for refills of BP medicines, discussed BP log and need to follow up closely with PCP. Patient also requesting albuterol rx as she has run out, will order. Will treat pain with symptomatic control and strict return precautions given. Will refer to back clinic. Patient in agreement with plan and all questions answered.             Scribe Attestation:   Scribe #1: I performed the  above scribed service and the documentation accurately describes the services I performed. I attest to the accuracy of the note.        ED Course as of 06/08/24 0042   Fri Jun 07, 2024   1333 ECG 12 lead  Normal sinus rhythm at 82.  No ST elevation or significant depression.  T-wave inversions in V1 and flattening in aVL.  Normal axis.  Incomplete right bundle-branch block.  QTC is 457.  This EKGs morphology is similar to an EKG from May of 2022.  She did have T-wave inversions in V1 and aVL at that time. [JT]      ED Course User Index  [JT] Robyn Patton MD I, Robyn Patton, personally performed the services described in this documentation.  All medical record entries made by the scribe were at my direction and in my presence.  I have reviewed the chart and agree that the record reflects my personal performance and is accurate and complete.    Clinical Impression:  Final diagnoses:  [R07.9] Chest pain  [M54.2] Neck pain  [M25.519] Shoulder pain  [M54.9] Back pain  [I10] Hypertension, unspecified type (Primary)  [F14.90] Cocaine use  [F15.90] Amphetamine use  [M54.12] Cervical radiculopathy          ED Disposition Condition    Discharge Stable          ED Prescriptions       Medication Sig Dispense Start Date End Date Auth. Provider    albuterol sulfate 2.5 mg/0.5 mL Nebu Take 2.5 mg by nebulization every 4 (four) hours as needed (cough and wheezing). Rescue 25 each 6/7/2024 6/7/2025 Robyn Patton MD    albuterol (PROVENTIL/VENTOLIN HFA) 90 mcg/actuation inhaler Inhale 1-2 puffs into the lungs every 6 (six) hours as needed for Wheezing. Rescue 8 g 6/7/2024 6/7/2025 Robyn Patton MD    hydroCHLOROthiazide (HYDRODIURIL) 25 MG tablet Take 1 tablet (25 mg total) by mouth once daily. 30 tablet 6/7/2024 6/7/2025 Robyn Patton MD    amLODIPine (NORVASC) 10 MG tablet Take 1 tablet (10 mg total) by mouth once daily. 30 tablet 6/7/2024 8/6/2024 Robyn Patton MD    naproxen (NAPROSYN) 500  MG tablet Take 1 tablet (500 mg total) by mouth 2 (two) times daily with meals. As needed for pain 20 tablet 6/7/2024 -- Robyn Patton MD    LIDOcaine (LIDODERM) 5 % Place 1 patch onto the skin daily as needed (pain). Remove & Discard patch within 12 hours, apply only to intact skin. 10 patch 6/7/2024 6/17/2024 Robyn Patton MD    methocarbamoL 1,000 mg Tab Take 1,000 mg by mouth 3 (three) times daily. 30 tablet 6/7/2024 -- Robyn Patton MD          Follow-up Information       Follow up With Specialties Details Why Contact Info Additional Information    Sofi Kennedy MD Family Medicine Schedule an appointment as soon as possible for a visit in 2 days to discuss recent ED visit, to establish primary doctor [address] [facility] [address] [phone]       [facility] Emergency Dept Emergency Medicine  As needed, If symptoms worsen [address] [facility] [place] [address] [phone]     [facility] Spine Services Schedule an appointment as soon as possible for a visit in 2 days to discuss recent ED visit [address], [address]  [facility] [address] [phone] Turn at Entrance 1 on [facility] in [facility] and take elevators to Floor 2. Follow signs to [facility]. Take [facility] Elevators to Floor 4 for [address].             Robyn Patton MD  06/08/24 0055
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
## 2024-06-07 NOTE — DISCHARGE INSTRUCTIONS
Please return to the ED immediately for any numbness, weakness, numbness in your groin, fever, stool or urinating on yourself or inability to stool or urinate. Follow up with your primary care doctor in 2-3 days to discuss recent ED visit and to discuss physical therapy or MRI.     Take your blood pressure at home twice a day for 3 days and write these numbers down, bring with you to your primary care doctor in 2-3 days for blood pressure recheck as you may need your medications changed. Return to the ED for chest pain, shortness of breath, numbness, weakness, loss of vision or any other concerns. Do not use cocaine or amphetamines which were both positive on your drug screen today as this can kill you with your high blood pressure.    Please follow up closely with primary care doctor to discuss stress test.     Thank you for coming to our Emergency Department today. As we discussed, it is important to remember that some problems are difficult to diagnose and may not be found during your Emergency Department visit. Be sure to follow up with your primary care doctor and review all labs/imaging/tests that were performed during this visit with them. Some labs/tests may be outside of the normal range and require non-emergent follow-up and further investigation to help diagnose/exclude/prevent complications or other medical conditions.    If you do not have a primary care doctor, you may contact the one listed on your discharge paperwork or you may also call the [facility] Appointment Desk at [phone] to schedule an appointment and establish care with one. It is important to your health that you have a primary care doctor.    Please take all medications as directed. All medications may potentially have side-effects and it is impossible to predict which medications may give you side-effects or what side-effects (if any) they will give you.. If you feel that you are having a negative effect or side-effect of any  medication you should immediately stop taking them and seek medical attention. If you feel that you are having a life-threatening reaction call 911.    Return to the ER with any questions/concerns, new/concerning symptoms, worsening or failure to improve.     Do not drive, swim, climb to height, take a bath or make any important decisions for 24 hours if you have received any pain medications, sedatives or mood altering drugs during your ER visit.    Addictive Disorders Referral Recommendations:   1) Call AA [phone]), NA ([phone]) or CA ([phone]) and ask for a representative to contact you to arrange a meeting time. Then follow up this initial meeting with 1 group meeting daily for the first two weeks.     2) Go to the Mental Health Center nearest your residence and bring any prescriptions with you for assistance in filling and schedule your psychiatry evaluation.   Mental Health Clinics ([facility]):   [facility] [address] [phone]   [address] [phone]   [facility] [address] [facility] [phone]   [facility] [address] [phone]   [facility] [address] [facility] [phone]   [facility] [address] [phone]   [facility] (for Veterans only) [phone]     3) Call the Residential [facility] Rehabilitation Centers every day until you get in:   [facility] ([address], [phone])   [facility] ([address],[phone])   [facility] (Females only, [address] [phone])   [facility] (Females only, [address] [facility], [phone])   [facility] (7 day program, $100, [address], [phone], [phone])   [facility], [address] [facility] [place], [phone]   [facility] [address] ([phone])   [facility], [address], [phone]   [facility], [address] [facility] [facility], [phone]     [facility] Detox program ([place]):  [phone]

## 2024-06-07 NOTE — ED NOTES
Patient ambulated to BR, without difficulty moving left arm more, states does not feel as heavy still will not extend or lift arm secondary to pain, when asked specific locations of pain states all over. Provider notified.

## 2024-06-10 LAB
OHS QRS DURATION: 94 MS
OHS QTC CALCULATION: 457 MS

## 2025-01-11 ENCOUNTER — HOSPITAL ENCOUNTER (EMERGENCY)
Facility: HOSPITAL | Age: 45
Discharge: HOME | End: 2025-01-11
Attending: EMERGENCY MEDICINE
Payer: MEDICAID

## 2025-01-11 VITALS
DIASTOLIC BLOOD PRESSURE: 106 MMHG | TEMPERATURE: 98 F | HEIGHT: 66 IN | BODY MASS INDEX: 25.71 KG/M2 | WEIGHT: 160 LBS | RESPIRATION RATE: 19 BRPM | HEART RATE: 85 BPM | OXYGEN SATURATION: 95 % | SYSTOLIC BLOOD PRESSURE: 166 MMHG

## 2025-01-11 DIAGNOSIS — I10 HYPERTENSION, UNSPECIFIED TYPE: ICD-10-CM

## 2025-01-11 DIAGNOSIS — R05.9 COUGH, UNSPECIFIED TYPE: Primary | ICD-10-CM

## 2025-01-11 DIAGNOSIS — J98.01 BRONCHOSPASM, ACUTE: ICD-10-CM

## 2025-01-11 DIAGNOSIS — H66.91 RIGHT OTITIS MEDIA, UNSPECIFIED OTITIS MEDIA TYPE: ICD-10-CM

## 2025-01-11 DIAGNOSIS — J18.9 PNEUMONIA OF RIGHT LOWER LOBE DUE TO INFECTIOUS ORGANISM: ICD-10-CM

## 2025-01-11 LAB
ALBUMIN SERPL BCP-MCNC: 3 G/DL (ref 3.5–5.2)
ALP SERPL-CCNC: 98 U/L (ref 40–150)
ALT SERPL W/O P-5'-P-CCNC: 16 U/L (ref 10–44)
ANION GAP SERPL CALC-SCNC: 11 MMOL/L (ref 8–16)
AST SERPL-CCNC: 22 U/L (ref 10–40)
B-HCG UR QL: NEGATIVE
BASOPHILS # BLD AUTO: 0.05 K/UL (ref 0–0.2)
BASOPHILS NFR BLD: 0.3 % (ref 0–1.9)
BILIRUB SERPL-MCNC: 0.3 MG/DL (ref 0.1–1)
BILIRUB UR QL STRIP: NEGATIVE
BUN SERPL-MCNC: 9 MG/DL (ref 6–20)
CALCIUM SERPL-MCNC: 9.7 MG/DL (ref 8.7–10.5)
CHLORIDE SERPL-SCNC: 104 MMOL/L (ref 95–110)
CLARITY UR: CLEAR
CO2 SERPL-SCNC: 24 MMOL/L (ref 23–29)
COLOR UR: YELLOW
CREAT SERPL-MCNC: 0.8 MG/DL (ref 0.5–1.4)
CTP QC/QA: YES
DIFFERENTIAL METHOD BLD: ABNORMAL
EOSINOPHIL # BLD AUTO: 0.1 K/UL (ref 0–0.5)
EOSINOPHIL NFR BLD: 0.7 % (ref 0–8)
ERYTHROCYTE [DISTWIDTH] IN BLOOD BY AUTOMATED COUNT: 11.9 % (ref 11.5–14.5)
EST. GFR  (NO RACE VARIABLE): >60 ML/MIN/1.73 M^2
GLUCOSE SERPL-MCNC: 83 MG/DL (ref 70–110)
GLUCOSE UR QL STRIP: NEGATIVE
HCT VFR BLD AUTO: 37.4 % (ref 37–48.5)
HGB BLD-MCNC: 12.6 G/DL (ref 12–16)
HGB UR QL STRIP: NEGATIVE
IMM GRANULOCYTES # BLD AUTO: 0.14 K/UL (ref 0–0.04)
IMM GRANULOCYTES NFR BLD AUTO: 0.8 % (ref 0–0.5)
KETONES UR QL STRIP: NEGATIVE
LEUKOCYTE ESTERASE UR QL STRIP: NEGATIVE
LYMPHOCYTES # BLD AUTO: 2.5 K/UL (ref 1–4.8)
LYMPHOCYTES NFR BLD: 15.1 % (ref 18–48)
MCH RBC QN AUTO: 32.6 PG (ref 27–31)
MCHC RBC AUTO-ENTMCNC: 33.7 G/DL (ref 32–36)
MCV RBC AUTO: 97 FL (ref 82–98)
MONOCYTES # BLD AUTO: 1.7 K/UL (ref 0.3–1)
MONOCYTES NFR BLD: 9.9 % (ref 4–15)
NEUTROPHILS # BLD AUTO: 12.2 K/UL (ref 1.8–7.7)
NEUTROPHILS NFR BLD: 73.2 % (ref 38–73)
NITRITE UR QL STRIP: NEGATIVE
NRBC BLD-RTO: 0 /100 WBC
PH UR STRIP: 8 [PH] (ref 5–8)
PLATELET # BLD AUTO: 434 K/UL (ref 150–450)
PMV BLD AUTO: 9.9 FL (ref 9.2–12.9)
POC MOLECULAR INFLUENZA A AGN: NEGATIVE
POC MOLECULAR INFLUENZA B AGN: NEGATIVE
POTASSIUM SERPL-SCNC: 4.2 MMOL/L (ref 3.5–5.1)
PROT SERPL-MCNC: 8.3 G/DL (ref 6–8.4)
PROT UR QL STRIP: NEGATIVE
RBC # BLD AUTO: 3.87 M/UL (ref 4–5.4)
SARS-COV-2 RDRP RESP QL NAA+PROBE: NEGATIVE
SODIUM SERPL-SCNC: 139 MMOL/L (ref 136–145)
SP GR UR STRIP: 1.02 (ref 1–1.03)
URN SPEC COLLECT METH UR: ABNORMAL
UROBILINOGEN UR STRIP-ACNC: ABNORMAL EU/DL
WBC # BLD AUTO: 16.62 K/UL (ref 3.9–12.7)

## 2025-01-11 PROCEDURE — 87635 SARS-COV-2 COVID-19 AMP PRB: CPT | Performed by: EMERGENCY MEDICINE

## 2025-01-11 PROCEDURE — 81025 URINE PREGNANCY TEST: CPT | Performed by: EMERGENCY MEDICINE

## 2025-01-11 PROCEDURE — 94760 N-INVAS EAR/PLS OXIMETRY 1: CPT

## 2025-01-11 PROCEDURE — 96361 HYDRATE IV INFUSION ADD-ON: CPT

## 2025-01-11 PROCEDURE — 25000242 PHARM REV CODE 250 ALT 637 W/ HCPCS: Performed by: EMERGENCY MEDICINE

## 2025-01-11 PROCEDURE — 87502 INFLUENZA DNA AMP PROBE: CPT

## 2025-01-11 PROCEDURE — 96375 TX/PRO/DX INJ NEW DRUG ADDON: CPT

## 2025-01-11 PROCEDURE — 85025 COMPLETE CBC W/AUTO DIFF WBC: CPT | Performed by: EMERGENCY MEDICINE

## 2025-01-11 PROCEDURE — 81003 URINALYSIS AUTO W/O SCOPE: CPT | Performed by: EMERGENCY MEDICINE

## 2025-01-11 PROCEDURE — 96374 THER/PROPH/DIAG INJ IV PUSH: CPT

## 2025-01-11 PROCEDURE — 63600175 PHARM REV CODE 636 W HCPCS: Performed by: EMERGENCY MEDICINE

## 2025-01-11 PROCEDURE — 94640 AIRWAY INHALATION TREATMENT: CPT

## 2025-01-11 PROCEDURE — 99284 EMERGENCY DEPT VISIT MOD MDM: CPT | Mod: 25

## 2025-01-11 PROCEDURE — 25000003 PHARM REV CODE 250: Performed by: EMERGENCY MEDICINE

## 2025-01-11 PROCEDURE — 80053 COMPREHEN METABOLIC PANEL: CPT | Performed by: EMERGENCY MEDICINE

## 2025-01-11 RX ORDER — ALBUTEROL SULFATE 90 UG/1
1-2 INHALANT RESPIRATORY (INHALATION) EVERY 6 HOURS PRN
Qty: 8 G | Refills: 0 | Status: SHIPPED | OUTPATIENT
Start: 2025-01-11 | End: 2025-01-11

## 2025-01-11 RX ORDER — ALBUTEROL SULFATE 2.5 MG/.5ML
2.5 SOLUTION RESPIRATORY (INHALATION) EVERY 4 HOURS PRN
Qty: 90 EACH | Refills: 0 | Status: SHIPPED | OUTPATIENT
Start: 2025-01-11 | End: 2025-02-10

## 2025-01-11 RX ORDER — ALBUTEROL SULFATE 90 UG/1
1-2 INHALANT RESPIRATORY (INHALATION) EVERY 6 HOURS PRN
Qty: 8 G | Refills: 0 | Status: SHIPPED | OUTPATIENT
Start: 2025-01-11 | End: 2026-01-11

## 2025-01-11 RX ORDER — IBUPROFEN 600 MG/1
600 TABLET ORAL 3 TIMES DAILY
Qty: 20 TABLET | Refills: 0 | Status: SHIPPED | OUTPATIENT
Start: 2025-01-11

## 2025-01-11 RX ORDER — ONDANSETRON HYDROCHLORIDE 2 MG/ML
4 INJECTION, SOLUTION INTRAVENOUS
Status: COMPLETED | OUTPATIENT
Start: 2025-01-11 | End: 2025-01-11

## 2025-01-11 RX ORDER — IPRATROPIUM BROMIDE AND ALBUTEROL SULFATE 2.5; .5 MG/3ML; MG/3ML
3 SOLUTION RESPIRATORY (INHALATION) ONCE
Status: COMPLETED | OUTPATIENT
Start: 2025-01-11 | End: 2025-01-11

## 2025-01-11 RX ORDER — HYDROMORPHONE HYDROCHLORIDE 1 MG/ML
1 INJECTION, SOLUTION INTRAMUSCULAR; INTRAVENOUS; SUBCUTANEOUS
Status: COMPLETED | OUTPATIENT
Start: 2025-01-11 | End: 2025-01-11

## 2025-01-11 RX ORDER — CEFTRIAXONE 1 G/1
1 INJECTION, POWDER, FOR SOLUTION INTRAMUSCULAR; INTRAVENOUS
Status: COMPLETED | OUTPATIENT
Start: 2025-01-11 | End: 2025-01-11

## 2025-01-11 RX ORDER — AMLODIPINE BESYLATE 5 MG/1
10 TABLET ORAL
Status: COMPLETED | OUTPATIENT
Start: 2025-01-11 | End: 2025-01-11

## 2025-01-11 RX ORDER — PREDNISONE 20 MG/1
40 TABLET ORAL DAILY
Qty: 10 TABLET | Refills: 0 | Status: SHIPPED | OUTPATIENT
Start: 2025-01-11 | End: 2025-01-16

## 2025-01-11 RX ORDER — DOXYCYCLINE 100 MG/1
100 CAPSULE ORAL 2 TIMES DAILY
Qty: 20 CAPSULE | Refills: 0 | Status: SHIPPED | OUTPATIENT
Start: 2025-01-11 | End: 2025-01-21

## 2025-01-11 RX ORDER — IPRATROPIUM BROMIDE AND ALBUTEROL SULFATE 2.5; .5 MG/3ML; MG/3ML
3 SOLUTION RESPIRATORY (INHALATION) EVERY 6 HOURS PRN
Qty: 90 EACH | Refills: 0 | Status: SHIPPED | OUTPATIENT
Start: 2025-01-11 | End: 2025-01-11

## 2025-01-11 RX ORDER — DOXYCYCLINE 100 MG/1
100 CAPSULE ORAL 2 TIMES DAILY
Qty: 20 CAPSULE | Refills: 0 | Status: SHIPPED | OUTPATIENT
Start: 2025-01-11 | End: 2025-01-11

## 2025-01-11 RX ORDER — DEXAMETHASONE SODIUM PHOSPHATE 4 MG/ML
6 INJECTION, SOLUTION INTRA-ARTICULAR; INTRALESIONAL; INTRAMUSCULAR; INTRAVENOUS; SOFT TISSUE
Status: COMPLETED | OUTPATIENT
Start: 2025-01-11 | End: 2025-01-11

## 2025-01-11 RX ADMIN — CEFTRIAXONE 1 G: 1 INJECTION, POWDER, FOR SOLUTION INTRAMUSCULAR; INTRAVENOUS at 03:01

## 2025-01-11 RX ADMIN — SODIUM CHLORIDE 1000 ML: 9 INJECTION, SOLUTION INTRAVENOUS at 03:01

## 2025-01-11 RX ADMIN — HYDROMORPHONE HYDROCHLORIDE 1 MG: 1 INJECTION, SOLUTION INTRAMUSCULAR; INTRAVENOUS; SUBCUTANEOUS at 03:01

## 2025-01-11 RX ADMIN — AMLODIPINE BESYLATE 10 MG: 5 TABLET ORAL at 05:01

## 2025-01-11 RX ADMIN — ONDANSETRON 4 MG: 2 INJECTION INTRAMUSCULAR; INTRAVENOUS at 03:01

## 2025-01-11 RX ADMIN — DEXAMETHASONE SODIUM PHOSPHATE 6 MG: 4 INJECTION INTRA-ARTICULAR; INTRALESIONAL; INTRAMUSCULAR; INTRAVENOUS; SOFT TISSUE at 03:01

## 2025-01-11 RX ADMIN — IPRATROPIUM BROMIDE AND ALBUTEROL SULFATE 3 ML: 2.5; .5 SOLUTION RESPIRATORY (INHALATION) at 02:01

## 2025-01-11 NOTE — ED NOTES
"Pt to ED today with cough, congestion, n/v/d, and fever for several days, pt states that she has been exposed to other sick people in her house and has been staying alone since then. Pt states "it feels like the flu" Pt also reports having drainage and severe ear pain on the R side.   "

## 2025-01-11 NOTE — ED PROVIDER NOTES
"  EM PHYSICIAN NOTE       This patient presents with a complaint of   Chief Complaint   Patient presents with    Cough     Patient reports cough, congestion, N/V/D and fever for several days.        Source of HPI & ROS: patient    HPI:  This is a 44-year-old woman who has a history of lupus and asthma, presenting to the emergency department with sore throat and right ear pain.  She has had a productive cough.  She has had nausea and vomiting.  She has been treating herself with electrolyte replacement fluids but is feeling worse.  She has had chills.  No blood in her vomit or stools.  No shortness of breath.      Review of patient's allergies indicates:   Allergen Reactions    Tangerine Anaphylaxis    Sweet orange(citrus sinensis) Hives    Oranges [orange]           Pertinent REVIEW of SYSTEMS  GENERAL/CONSTITUTIONAL:  See HPI  CARDIOVASCULAR: There is not a report of chest pain   RESPIRATORY: There is a report of SOB  GASTROINTESTINAL: There is not a report of  vomiting, diarrhea  HEMATOLOGIC/LYMPHATIC: There is not a report of anticoagulant/antithrombotic use.     The nurse's notes and triage vital signs were reviewed.    PHYSICAL EXAMINATION    ED Triage Vitals [01/11/25 1331]   Encounter Vitals Group      BP (!) 209/115      Systolic BP Percentile       Diastolic BP Percentile       Pulse 94      Resp 18      Temp 98.4 °F (36.9 °C)      Temp src       SpO2 96 %      Weight 160 lb      Height 5' 6"      Head Circumference       Peak Flow       Pain Score       Pain Loc       Pain Education       Exclude from Growth Chart      Vital signs and Pulse Ox reviewed in clinical context. Abnormalities noted:  Blood pressure is elevated, not at goal  Body mass index is 25.82 kg/m².  Pt's level of consciousness is Awake and Alert, and the patient is in moderate distress.  Skin: warm, pink and dry.  Capillary refill is less than 2 seconds.  Mucosa: inflamed oropharynx.  Uvula is midline.  The right TM is bulging and " red  Head and Neck: no JVD, neck supple  Cardiac exam: RRR I did appreciate a very soft systolic murmur.  Pulmonary exam: unlabored and clear  Abd Exam: soft nontender   Musculoskeletal: no joint tenderness, deformity or swelling   Neurologic: GCS 15; moving all extremities equally, no facial droop       Medical decision making:   Nurses notes and Vital Signs reviewed.     Problems: Today's visit reveals cough, ear pain, throat pain which is a/an  Acute problem with a differential diagnosis which includes otitis media, pharyngitis, COVID, influenza, pneumonia, dehydration.    Other problems today include  elevated blood pressure not at goal     MDM Components integrated into this visit: Social determinants of health impacting care today: Access to PCP impaired because patient was unable to obtain appointment with PCP in a timely manner    Data: see ER course below for lab test ordered, results reviewed, independent interpretation of images or EKG, discussion with consultants, data obtained from sources other than patient:  ED Course as of 01/11/25 1809   Sat Jan 11, 2025   1626 CBC reveals [place] [address].  H&H is normal.  Platelets are normal.  There is a bandemia: Antibiotics have already been ordered [MH]   1627 Influenza negative and COVID negative [MH]   1627 CXR: Bibasilar haziness, likely hypoaeration changes with medial right lung base pneumonitis not excluded.  Based on patient's history this is consistent with pneumonia. []   1758 Patient is feeling much better requesting discharge.  I will treat as out patient pneumonia [MH]   1800 Blood pressure 166/106, improving: patient will continue to take her home medicines []      ED Course User Index  [] Romelia Pennington MD           Orders Placed This Encounter   Procedures    NEBULIZER FOR HOME USE    X-Ray Chest AP Portable    Comprehensive Metabolic Panel    CBC Auto Differential    Urinalysis    Inhalation Treatment Once    POCT Influenza A/B  Molecular    POCT COVID-19 Rapid Screening    POCT urine pregnancy    Saline lock IV     Medications   sodium chloride 0.9% bolus 1,000 mL 1,000 mL (0 mLs Intravenous Stopped 1/11/25 1724)   dexAMETHasone injection 6 mg (6 mg Intravenous Given 1/11/25 1523)   cefTRIAXone injection 1 g (1 g Intravenous Given 1/11/25 1533)   albuterol-ipratropium 2.5 mg-0.5 mg/3 mL nebulizer solution 3 mL (3 mLs Nebulization Given 1/11/25 1446)   HYDROmorphone injection 1 mg (1 mg Intravenous Given 1/11/25 1523)   ondansetron injection 4 mg (4 mg Intravenous Given 1/11/25 1523)   amLODIPine tablet 10 mg (10 mg Oral Given 1/11/25 1718)           Diagnoses that have been ruled out:   None   Diagnoses that are still under consideration:   None   Final diagnoses:   Cough, unspecified type   Pneumonia of right lower lobe due to infectious organism   Hypertension, unspecified type   Right otitis media, unspecified otitis media type   Bronchospasm, acute          Referral for follow-up  Follow-up Information       Follow up With Specialties Details Why Contact Sofi Reynoso MD Family Medicine In 3 days Call to schedule an appointment [address] [place] [address] [phone]      [facility]  In 2 days As needed [email or web address] to [facility] or have a same day ONLINE checkup.            Prescription management:  ED Prescriptions       Medication Sig Dispense Start Date End Date Auth. Provider    albuterol-ipratropium (DUO-NEB) 2.5 mg-0.5 mg/3 mL nebulizer solution  (Status: Discontinued) Take 3 mLs by nebulization every 6 (six) hours as needed for Wheezing. Rescue 90 each 1/11/2025 1/11/2025 Romelia Pennington MD    albuterol (PROVENTIL/VENTOLIN HFA) 90 mcg/actuation inhaler  (Status: Discontinued) Inhale 1-2 puffs into the lungs every 6 (six) hours as needed for Wheezing. Rescue 8 g 1/11/2025 1/11/2025 Romelia Pennington MD    doxycycline (VIBRAMYCIN) 100 MG Cap  (Status:  Discontinued) Take 1 capsule (100 mg total) by mouth 2 (two) times daily. for 10 days 20 capsule 1/11/2025 1/11/2025 Romelia Pennington MD    albuterol (PROVENTIL/VENTOLIN HFA) 90 mcg/actuation inhaler Inhale 1-2 puffs into the lungs every 6 (six) hours as needed for Wheezing. Rescue 8 g 1/11/2025 1/11/2026 Romelia Pennington MD    albuterol sulfate 2.5 mg/0.5 mL Nebu Take 2.5 mg by nebulization every 4 (four) hours as needed (wheeze). Rescue 90 each 1/11/2025 2/10/2025 Romelia Pennington MD    doxycycline (VIBRAMYCIN) 100 MG Cap Take 1 capsule (100 mg total) by mouth 2 (two) times daily. for 10 days 20 capsule 1/11/2025 1/21/2025 Romelia Pennington MD    predniSONE (DELTASONE) 20 MG tablet Take 2 tablets (40 mg total) by mouth once daily. for 5 days 10 tablet 1/11/2025 1/16/2025 Romelia Pennington MD    ibuprofen (ADVIL,MOTRIN) 600 MG tablet Take 1 tablet (600 mg total) by mouth 3 (three) times daily. Take for 5 days with food and tylenol 500 mg, then p.r.n. pain t.i.d. 20 tablet 1/11/2025 -- Romelia Pennington MD            Disposition:  Discharge    Romelia Pennington      This note was created using dictation software.  This program may occasionally mistype words and phrases.             Romelia Pennington MD  01/11/25 2793

## 2025-01-12 NOTE — DISCHARGE INSTRUCTIONS
As we discussed, it is important that you return to the ER for any new concerns or symptoms, worsening of your existing symptoms, if you do not completely improve, or if you are unable to be seen by your primary care provider.  An ER visit cannot replace the evaluation by your primary care provider!!    In the emergency department our goal is to rule-out life threatening conditions and make sure that you are safe to be discharged home. Many medical conditions are difficult to diagnose and may be impossible to be diagnosed during a single ER visit. These conditions often start with non-specific symptoms and can only be diagnosed on follow up visits with your primary care physician or specialist when the symptoms continue or change. Please remember that all medical conditions can change, and we cannot predict how you will be feeling tomorrow or the next day, so if you have any worsening or new symptoms, you should not hesitate to return to the emergency department for re-evaluation.        Be sure to follow up with your primary care doctor for a recheck and to review any labs/imaging that were performed today.  It is very common for us to identify non-emergent incidental findings on labs and imaging which must be followed up with your primary care physician. If you do not have a primary care doctor, you may contact the one listed on your discharge paperwork or you may also call the [facility] Appointment Desk at [phone] to schedule an appointment with one.       All medications have side effects.  We have done our best to select a medication for you that will treat your health problem and have the least amount of side effects.  If at any time while or after you take this medication you develops new symptoms or have any concerns, it is important you stop taking the medication and call your primary care provider or return to the emergency department for evaluation.

## 2025-05-28 ENCOUNTER — HOSPITAL ENCOUNTER (EMERGENCY)
Facility: HOSPITAL | Age: 45
Discharge: HOME | End: 2025-05-28
Attending: STUDENT IN AN ORGANIZED HEALTH CARE EDUCATION/TRAINING PROGRAM
Payer: MEDICAID

## 2025-05-28 VITALS
WEIGHT: 170 LBS | RESPIRATION RATE: 19 BRPM | SYSTOLIC BLOOD PRESSURE: 192 MMHG | HEIGHT: 66 IN | TEMPERATURE: 98 F | OXYGEN SATURATION: 97 % | BODY MASS INDEX: 27.32 KG/M2 | HEART RATE: 90 BPM | DIASTOLIC BLOOD PRESSURE: 112 MMHG

## 2025-05-28 DIAGNOSIS — R40.20 LOSS OF CONSCIOUSNESS: ICD-10-CM

## 2025-05-28 LAB
ABSOLUTE EOSINOPHIL (OHS): 0.35 K/UL
ABSOLUTE MONOCYTE (OHS): 0.63 K/UL (ref 0.3–1)
ABSOLUTE NEUTROPHIL COUNT (OHS): 3.33 K/UL (ref 1.8–7.7)
ALBUMIN SERPL BCP-MCNC: 3.9 G/DL (ref 3.5–5.2)
ALLENS TEST: ABNORMAL
ALP SERPL-CCNC: 52 UNIT/L (ref 40–150)
ALT SERPL W/O P-5'-P-CCNC: 40 UNIT/L (ref 10–44)
AMPHET UR QL SCN: NEGATIVE
ANION GAP (OHS): 12 MMOL/L (ref 8–16)
AST SERPL-CCNC: 49 UNIT/L (ref 11–45)
B-HCG UR QL: NEGATIVE
BARBITURATE SCN PRESENT UR: NEGATIVE
BASOPHILS # BLD AUTO: 0.02 K/UL
BASOPHILS NFR BLD AUTO: 0.3 %
BENZODIAZ UR QL SCN: NEGATIVE
BILIRUB SERPL-MCNC: 0.3 MG/DL (ref 0.1–1)
BILIRUB UR QL STRIP.AUTO: NEGATIVE
BNP SERPL-MCNC: 12 PG/ML (ref 0–99)
BUN SERPL-MCNC: 12 MG/DL (ref 6–20)
CALCIUM SERPL-MCNC: 9.2 MG/DL (ref 8.7–10.5)
CANNABINOIDS UR QL SCN: NEGATIVE
CHLORIDE SERPL-SCNC: 107 MMOL/L (ref 95–110)
CLARITY UR: CLEAR
CO2 SERPL-SCNC: 19 MMOL/L (ref 23–29)
COCAINE UR QL SCN: NEGATIVE
COLOR UR AUTO: COLORLESS
CREAT SERPL-MCNC: 1.1 MG/DL (ref 0.5–1.4)
CREAT UR-MCNC: 33.7 MG/DL (ref 15–325)
CTP QC/QA: YES
DELSYS: ABNORMAL
ERYTHROCYTE [DISTWIDTH] IN BLOOD BY AUTOMATED COUNT: 12.7 % (ref 11.5–14.5)
ETHANOL SERPL-MCNC: 157 MG/DL
FIO2: 21
GFR SERPLBLD CREATININE-BSD FMLA CKD-EPI: >60 ML/MIN/1.73/M2
GLUCOSE SERPL-MCNC: 92 MG/DL (ref 70–110)
GLUCOSE UR QL STRIP: NEGATIVE
HCO3 UR-SCNC: 24.6 MMOL/L (ref 24–28)
HCT VFR BLD AUTO: 40.7 % (ref 37–48.5)
HGB BLD-MCNC: 13.4 GM/DL (ref 12–16)
HGB UR QL STRIP: NEGATIVE
IMM GRANULOCYTES # BLD AUTO: 0.01 K/UL (ref 0–0.04)
IMM GRANULOCYTES NFR BLD AUTO: 0.2 % (ref 0–0.5)
KETONES UR QL STRIP: NEGATIVE
LACTATE SERPL-SCNC: 1.5 MMOL/L (ref 0.5–2.2)
LEUKOCYTE ESTERASE UR QL STRIP: NEGATIVE
LYMPHOCYTES # BLD AUTO: 2.18 K/UL (ref 1–4.8)
MCH RBC QN AUTO: 32.4 PG (ref 27–31)
MCHC RBC AUTO-ENTMCNC: 32.9 G/DL (ref 32–36)
MCV RBC AUTO: 98 FL (ref 82–98)
METHADONE UR QL SCN: NEGATIVE
MODE: ABNORMAL
NITRITE UR QL STRIP: NEGATIVE
NUCLEATED RBC (/100WBC) (OHS): 0 /100 WBC
OPIATES UR QL SCN: NEGATIVE
PCO2 BLDA: 44.4 MMHG (ref 35–45)
PCP UR QL: NEGATIVE
PH SMN: 7.35 [PH] (ref 7.35–7.45)
PH UR STRIP: 6 [PH]
PLATELET # BLD AUTO: 261 K/UL (ref 150–450)
PMV BLD AUTO: 9.6 FL (ref 9.2–12.9)
PO2 BLDA: 33 MMHG (ref 40–60)
POC BE: -1 MMOL/L (ref -2–2)
POC SATURATED O2: 60 % (ref 95–100)
POC TCO2: 26 MMOL/L (ref 24–29)
POCT GLUCOSE: 102 MG/DL (ref 70–110)
POTASSIUM SERPL-SCNC: 3.8 MMOL/L (ref 3.5–5.1)
PROT SERPL-MCNC: 8.7 GM/DL (ref 6–8.4)
PROT UR QL STRIP: NEGATIVE
RBC # BLD AUTO: 4.14 M/UL (ref 4–5.4)
RELATIVE EOSINOPHIL (OHS): 5.4 %
RELATIVE LYMPHOCYTE (OHS): 33.4 % (ref 18–48)
RELATIVE MONOCYTE (OHS): 9.7 % (ref 4–15)
RELATIVE NEUTROPHIL (OHS): 51 % (ref 38–73)
SAMPLE: ABNORMAL
SITE: ABNORMAL
SODIUM SERPL-SCNC: 138 MMOL/L (ref 136–145)
SP GR UR STRIP: 1.01
TROPONIN I SERPL DL<=0.01 NG/ML-MCNC: <0.006 NG/ML
UROBILINOGEN UR STRIP-ACNC: NEGATIVE EU/DL
WBC # BLD AUTO: 6.52 K/UL (ref 3.9–12.7)

## 2025-05-28 PROCEDURE — 82962 GLUCOSE BLOOD TEST: CPT

## 2025-05-28 PROCEDURE — 25000003 PHARM REV CODE 250: Performed by: STUDENT IN AN ORGANIZED HEALTH CARE EDUCATION/TRAINING PROGRAM

## 2025-05-28 PROCEDURE — 80307 DRUG TEST PRSMV CHEM ANLYZR: CPT | Performed by: STUDENT IN AN ORGANIZED HEALTH CARE EDUCATION/TRAINING PROGRAM

## 2025-05-28 PROCEDURE — 93005 ELECTROCARDIOGRAM TRACING: CPT

## 2025-05-28 PROCEDURE — 80053 COMPREHEN METABOLIC PANEL: CPT | Performed by: STUDENT IN AN ORGANIZED HEALTH CARE EDUCATION/TRAINING PROGRAM

## 2025-05-28 PROCEDURE — 96374 THER/PROPH/DIAG INJ IV PUSH: CPT

## 2025-05-28 PROCEDURE — 63600175 PHARM REV CODE 636 W HCPCS: Mod: JZ,TB | Performed by: STUDENT IN AN ORGANIZED HEALTH CARE EDUCATION/TRAINING PROGRAM

## 2025-05-28 PROCEDURE — 96375 TX/PRO/DX INJ NEW DRUG ADDON: CPT

## 2025-05-28 PROCEDURE — 81003 URINALYSIS AUTO W/O SCOPE: CPT | Performed by: STUDENT IN AN ORGANIZED HEALTH CARE EDUCATION/TRAINING PROGRAM

## 2025-05-28 PROCEDURE — 82077 ASSAY SPEC XCP UR&BREATH IA: CPT | Performed by: STUDENT IN AN ORGANIZED HEALTH CARE EDUCATION/TRAINING PROGRAM

## 2025-05-28 PROCEDURE — 99900035 HC TECH TIME PER 15 MIN (STAT)

## 2025-05-28 PROCEDURE — 82803 BLOOD GASES ANY COMBINATION: CPT

## 2025-05-28 PROCEDURE — 99284 EMERGENCY DEPT VISIT MOD MDM: CPT | Mod: 25

## 2025-05-28 PROCEDURE — 83605 ASSAY OF LACTIC ACID: CPT | Performed by: STUDENT IN AN ORGANIZED HEALTH CARE EDUCATION/TRAINING PROGRAM

## 2025-05-28 PROCEDURE — 83880 ASSAY OF NATRIURETIC PEPTIDE: CPT | Performed by: STUDENT IN AN ORGANIZED HEALTH CARE EDUCATION/TRAINING PROGRAM

## 2025-05-28 PROCEDURE — 85025 COMPLETE CBC W/AUTO DIFF WBC: CPT | Performed by: STUDENT IN AN ORGANIZED HEALTH CARE EDUCATION/TRAINING PROGRAM

## 2025-05-28 PROCEDURE — 84484 ASSAY OF TROPONIN QUANT: CPT | Performed by: STUDENT IN AN ORGANIZED HEALTH CARE EDUCATION/TRAINING PROGRAM

## 2025-05-28 PROCEDURE — 81025 URINE PREGNANCY TEST: CPT | Performed by: STUDENT IN AN ORGANIZED HEALTH CARE EDUCATION/TRAINING PROGRAM

## 2025-05-28 PROCEDURE — 93010 ELECTROCARDIOGRAM REPORT: CPT | Mod: ,,, | Performed by: INTERNAL MEDICINE

## 2025-05-28 PROCEDURE — 94761 N-INVAS EAR/PLS OXIMETRY MLT: CPT | Mod: XB

## 2025-05-28 RX ORDER — HYDRALAZINE HYDROCHLORIDE 20 MG/ML
10 INJECTION INTRAMUSCULAR; INTRAVENOUS
Status: COMPLETED | OUTPATIENT
Start: 2025-05-28 | End: 2025-05-28

## 2025-05-28 RX ORDER — KETOROLAC TROMETHAMINE 30 MG/ML
15 INJECTION, SOLUTION INTRAMUSCULAR; INTRAVENOUS
Status: COMPLETED | OUTPATIENT
Start: 2025-05-28 | End: 2025-05-28

## 2025-05-28 RX ADMIN — SODIUM CHLORIDE 500 ML: 9 INJECTION, SOLUTION INTRAVENOUS at 10:05

## 2025-05-28 RX ADMIN — KETOROLAC TROMETHAMINE 15 MG: 30 INJECTION, SOLUTION INTRAMUSCULAR; INTRAVENOUS at 10:05

## 2025-05-28 RX ADMIN — HYDRALAZINE HYDROCHLORIDE 10 MG: 20 INJECTION INTRAMUSCULAR; INTRAVENOUS at 10:05

## 2025-05-29 LAB
HOLD SPECIMEN: NORMAL
OHS QRS DURATION: 98 MS
OHS QTC CALCULATION: 445 MS

## 2025-05-29 NOTE — ED PROVIDER NOTES
Encounter Date: 2025       History     Chief Complaint   Patient presents with    Loss of Consciousness     BIB [place] [facility] after being found down in her daughters bathroom. Per EMS upon their contact the pt was responsive to painful stimuli only but became increasingly more alert. EMS reports pt endorses being out drinking w/ friends but denies drug use,  - pinpoint pupils and pt was not admin nalaxone. Pt currently AO in triage and reporting CP. Pt also hypertensive w/ EMS who reports a BP of 200/130. BP currently 189/134 in triage.      44-year-old female with history of bipolar, polysubstance abuse, seizures, lupus, presents now after being found in the bathroom by her family, difficult to arouse.  They sternal rubbed her when they found her in the bathroom, and after a little while, she awoke.  They reported she was frothing at the mouth.  EMS arrived to find her awake, answering questions, and she reported that she has been drinking margaritas tonight at her daughter's house prior to this event.  Patient now reports chest wall pain at the site of where she was being sternal rubbed.  She has no other complaints.      Review of patient's allergies indicates:   Allergen Reactions    Tangerine Anaphylaxis    Sweet orange(citrus sinensis) Hives    Oranges [orange]      Past Medical History:   Diagnosis Date    Asthma     Bipolar 1 disorder     Chronic hepatitis C without hepatic coma 2022    Hep C quantitative PCR positive    Cocaine abuse     DM mellitus, gestational     ETOH abuse     Hypertension     Methamphetamine abuse     Opiate overdose     Seizures     Systemic lupus erythematosus, organ or system involvement unspecified     Trichomonas infection      Past Surgical History:   Procedure Laterality Date     SECTION, LOW TRANSVERSE      x 3    ECTOPIC PREGNANCY SURGERY      KNEE SURGERY  2010    left knee    SALPINGECTOMY      THYROID SURGERY      TUBAL LIGATION       Family History    Problem Relation Name Age of Onset    Heart disease Mother      Cancer Mother          Breast Cancer    Cancer Father          Throat Cancer    Cancer Maternal Grandmother      Cancer Maternal Grandfather       Social History[1]  Review of Systems   Neurological:  Positive for syncope.       Physical Exam     Initial Vitals [05/28/25 2146]   BP Pulse Resp Temp SpO2   (!) 189/134 84 20 98 °F (36.7 °C) 100 %      MAP       --         Physical Exam    Nursing note and vitals reviewed.  Constitutional: She appears well-developed and well-nourished. She is not diaphoretic.   HENT:   Head: Normocephalic and atraumatic. Mouth/Throat: Oropharynx is clear and moist.   Eyes: EOM are normal. Pupils are equal, round, and reactive to light. Right eye exhibits no discharge. Left eye exhibits no discharge.   Neck: No tracheal deviation present.   Normal range of motion.  Cardiovascular:  Normal rate, regular rhythm and intact distal pulses.           Pulmonary/Chest: No respiratory distress. She has no wheezes. She exhibits no tenderness.   Abdominal: Abdomen is soft. She exhibits no distension. There is no abdominal tenderness.   Musculoskeletal:         General: No tenderness or edema. Normal range of motion.      Cervical back: Normal range of motion.     Neurological: She is alert and oriented to person, place, and time. She has normal strength. No cranial nerve deficit or sensory deficit. GCS eye subscore is 4. GCS verbal subscore is 5. GCS motor subscore is 6.   Skin: Skin is warm and dry. No rash noted.   Psychiatric: She has a normal mood and affect. Her behavior is normal. Thought content normal.         ED Course   Procedures  Labs Reviewed   COMPREHENSIVE METABOLIC PANEL - Abnormal       Result Value    Sodium 138      Potassium 3.8      Chloride 107      CO2 19 (*)     Glucose 92      BUN 12      Creatinine 1.1      Calcium 9.2      Protein Total 8.7 (*)     Albumin 3.9      Bilirubin Total 0.3      ALP 52      AST  49 (*)     ALT 40      Anion Gap 12      eGFR >60     URINALYSIS, REFLEX TO URINE CULTURE - Abnormal    Color, UA Colorless (*)     Appearance, UA Clear      pH, UA 6.0      Spec Grav UA 1.010      Protein, UA Negative      Glucose, UA Negative      Ketones, UA Negative      Bilirubin, UA Negative      Blood, UA Negative      Nitrites, UA Negative      Urobilinogen, UA Negative      Leukocyte Esterase, UA Negative     ALCOHOL,MEDICAL (ETHANOL) - Abnormal    Alcohol, Serum 157 (*)    CBC WITH DIFFERENTIAL - Abnormal    WBC 6.52      RBC 4.14      HGB 13.4      HCT 40.7      MCV 98      MCH 32.4 (*)     MCHC 32.9      RDW 12.7      Platelet Count 261      MPV 9.6      Nucleated RBC 0      Neut % 51.0      Lymph % 33.4      Mono % 9.7      Eos % 5.4      Basophil % 0.3      Imm Grans % 0.2      Neut # 3.33      Lymph # 2.18      Mono # 0.63      Eos # 0.35      Baso # 0.02      Imm Grans # 0.01     ISTAT PROCEDURE - Abnormal    POC PH 7.351      POC PCO2 44.4      POC PO2 33 (*)     POC HCO3 24.6      POC BE -1      POC SATURATED O2 60      POC TCO2 26      Sample VENOUS      Site Other      Allens Test N/A      DelSys Room Air      Mode SPONT      FiO2 21     B-TYPE NATRIURETIC PEPTIDE - Normal    BNP 12     DRUG SCREEN PANEL, URINE EMERGENCY - Normal    Benzodiazepine, Urine Negative      Methadone, Urine Negative      Cocaine, Urine Negative      Opiates, Urine Negative      Barbiturates, Urine Negative      Amphetamines, Urine Negative      THC Negative      Phencyclidine, Urine Negative      Urine Creatinine 33.7      Narrative:     This screen includes the following classes of drugs at the listed cut-off:     Benzodiazepines:        200 ng/ml   Methadone:              300 ng/ml   Cocaine metabolite:     300 ng/ml   Opiates:                300 ng/ml   Barbiturates:           200 ng/ml   Amphetamines:           1000 ng/ml   Marijuana metabs (THC): 50 ng/ml   Phencyclidine (PCP):    25 ng/ml     This is a screening  "test. If results do not correlate with clinical presentation, then a confirmatory send out test is advised.    This report is intended for use in clinical monitoring and management of patients. It is not intended for use in employment related drug testing."   LACTIC ACID, PLASMA - Normal    Lactic Acid Level 1.5      Narrative:     Falsely low lactic acid results can be found in samples containing >=13.0 mg/dL total bilirubin and/or >=3.5 mg/dL direct bilirubin.    TROPONIN I - Normal    Troponin-I <0.006     CBC W/ AUTO DIFFERENTIAL    Narrative:     The following orders were created for panel order CBC auto differential.  Procedure                               Abnormality         Status                     ---------                               -----------         ------                     CBC with Differential[[id number]]       Abnormal            Final result                 Please view results for these tests on the individual orders.   GREY TOP URINE HOLD    Extra Tube Hold for add-ons.     POCT URINE PREGNANCY    POC Preg Test, Ur Negative       Acceptable Yes     POCT GLUCOSE    POCT Glucose 102            Imaging Results    None          Medications   hydrALAZINE injection 10 mg (10 mg Intravenous Given 5/28/25 2239)   ketorolac injection 15 mg (15 mg Intravenous Given 5/28/25 2242)   sodium chloride 0.9% bolus 500 mL 500 mL (500 mLs Intravenous New Bag 5/28/25 2243)     Medical Decision Making  44-year-old female with history of polysubstance abuse was found on the bathroom floor, minimally responsive by her family.  Vitals here notable for hypertension.  Patient reports drinking alcohol tonight but denies other illicit drug use.  She does have history of polysubstance abuse.  Differential diagnosis includes alcohol intoxication, polysubstance abuse, arrhythmia, electrolyte abnormality among others.  Broad workup initiated.  Disposition pending workup, symptom control.    Amount and/or " "Complexity of Data Reviewed  Labs: ordered.    Risk  Prescription drug management.               ED Course as of 05/29/25 0536   Thu May 29, 2025   0535 Patient has sobered appropriately, ethanol level 154, likely the cause of her symptoms.  She was able to ambulate without difficulty.  She remained hypertensive, but refused medication.  Other workup reassuring.  On reexamination, she stated she wanted to go home.  She ambulates with steady gait, had no clinical signs of intoxication, and her daughter came to pick her up to drive her home.  Stable for discharge with outpatient follow-up and return precautions [BS]      ED Course User Index  [BS] Julio Matthews MD                           Clinical Impression:  Final diagnoses:  [R40.20] Loss of consciousness          ED Disposition Condition    Discharge Stable          ED Prescriptions    None       Follow-up Information       Follow up With Specialties Details Why Contact Info    Sofi Kennedy MD Family Medicine Call in 1 day To discuss recent ED visit [id number] [facility] [id number]  [phone]                     [1]   Social History  Tobacco Use    Smoking status: Some Days     Types: Cigars, Cigarettes    Smokeless tobacco: Never    Tobacco comments:     Patient states she smokes 1 cigar per week.   Substance Use Topics    Alcohol use: Yes     Comment: binge drinker    Drug use: Yes     Types: Marijuana, "Crack" cocaine, MDMA (Ecstacy), Amphetamines, Cocaine, Methamphetamines, Heroin        Julio Matthews MD  05/29/25 0536    "

## 2025-05-29 NOTE — ED TRIAGE NOTES
Pt reports to ED via EMS after daughter found her in bathroom unresponsive and foaming from mouth. Pt awake and drowsy. States she does not remember being inside, remembers being in a truck, out drinking with friends. Admits to smoking and drinking regularly. Pt reporting chest pain and HA.Daughter at bedside.